# Patient Record
Sex: MALE | Race: WHITE | NOT HISPANIC OR LATINO | Employment: OTHER | URBAN - METROPOLITAN AREA
[De-identification: names, ages, dates, MRNs, and addresses within clinical notes are randomized per-mention and may not be internally consistent; named-entity substitution may affect disease eponyms.]

---

## 2017-01-30 ENCOUNTER — ANTI-COAG VISIT (OUTPATIENT)
Dept: CARDIOLOGY | Facility: CLINIC | Age: 82
End: 2017-01-30
Payer: MEDICARE

## 2017-01-30 DIAGNOSIS — Z79.01 LONG TERM (CURRENT) USE OF ANTICOAGULANTS: Primary | ICD-10-CM

## 2017-01-30 DIAGNOSIS — I48.91 ATRIAL FIBRILLATION, UNSPECIFIED TYPE: ICD-10-CM

## 2017-01-30 LAB
CTP QC/QA: YES
INR PPP: 2.5 (ref 2–3)

## 2017-01-30 PROCEDURE — 85610 PROTHROMBIN TIME: CPT | Mod: PBBFAC,PO | Performed by: PHARMACIST

## 2017-01-30 PROCEDURE — 99999 PR PBB SHADOW E&M-EST. PATIENT-LVL I: CPT | Mod: PBBFAC,,, | Performed by: PHARMACIST

## 2017-01-30 PROCEDURE — 99211 OFF/OP EST MAY X REQ PHY/QHP: CPT | Mod: PBBFAC,PO | Performed by: PHARMACIST

## 2017-01-30 NOTE — PROGRESS NOTES
INR in range.  Pt denies any changes in meds/diet/health.  Will continue dose and recheck in 8 weeks

## 2017-01-30 NOTE — MR AVS SNAPSHOT
[place] [facility]  [address] [facility] [address]  [place] [address]  Phone: [phone]                  Roby Rodriguez   2017 11:15 AM   Anti-coag visit    Description:  Male : 1930   Provider:  Myrna Retana, Del   Department:  [facility]           Diagnoses this Visit        Comments    Long term (current) use of anticoagulants    -  Primary     Atrial fibrillation, unspecified type                To Do List           Future Appointments        Provider Department Dept Phone    3/13/2017 8:00 AM HOME MONITOR DEVICE CHECK, [facility] Gaurav [facility]ourke Ar[facility] [phone]    3/27/2017 11:15 AM Myrna Retana, Del [facility] [phone]    2017 10:40 AM MD Gaurav [facility] - Arrhythmia [phone]      Goals (5 Years of Data)     None      [facility]     [facility] -  Assistance  Registered nurses in the [facility] provide clinical advisement, health education, appointment booking, and other advisory services.  Call for this free service at [phone].             Medications           Message regarding Medications     Verify the changes and/or additions to your medication regime listed below are the same as discussed with your clinician today.  If any of these changes or additions are incorrect, please notify your healthcare provider.             Verify that the below list of medications is an accurate representation of the medications you are currently taking.  If none reported, the list may be blank. If incorrect, please contact your healthcare provider. Carry this list with you in case of emergency.           Current Medications     antipyrine-benzocaine (AURALGAN OR EQUIV) 5.4-1.4 % Drop PLACE 3 DROPS INTO BOTH EARS 4 (FOUR) TIMES DAILY.    guanfacine (TENEX) 1 MG Tab Take 1 tablet (1 mg total) by mouth every evening.    lisinopril (PRINIVIL,ZESTRIL) 20 MG tablet Take 1 tablet (20 mg total) by mouth 2 (two) times  daily.    metoprolol succinate (TOPROL-XL) 50 MG 24 hr tablet TAKE 1 TABLET ONCE DAILY    warfarin (COUMADIN) 5 MG tablet Take 0.5-1 tablets (2.5-5 mg total) by mouth Daily. Take 1/2 to 1 tablet every evening as instructed by [facility]           Clinical Reference Information           Allergies as of 1/30/2017     Amlodipine    Dyazide [Triamterene-hydrochlorothiazid]      Immunizations Administered on Date of Encounter - 1/30/2017     None      Orders Placed During Today's Visit      Normal Orders This Visit    POCT INR          1/30/2017 11:32 AM - Myrna Retana, PharmD      Component Results     Component    INR    2.5     Acceptable    Yes      December 2016 Details    Sun Mon Tue Wed Thu Fri Sat         1               2               3                 4               5   2.3   2.5 mg   See details      6      5 mg         7      2.5 mg         8      2.5 mg         9      2.5 mg         10      5 mg           11      2.5 mg         12      2.5 mg         13      5 mg         14      2.5 mg         15      2.5 mg         16      2.5 mg         17      5 mg           18      2.5 mg         19      2.5 mg         20      5 mg         21      2.5 mg         22      2.5 mg         23      2.5 mg         24      5 mg           25      2.5 mg         26      2.5 mg         27      5 mg         28      2.5 mg         29      2.5 mg         30      2.5 mg         31      5 mg          Date Details   12/05 Last INR check   INR: 2.3                 January 2017 Details    Sun Mon Tue Wed Thu Fri Sat     1      2.5 mg         2      2.5 mg         3      5 mg         4      2.5 mg         5      2.5 mg         6      2.5 mg         7      5 mg           8      2.5 mg         9      2.5 mg         10      5 mg         11      2.5 mg         12      2.5 mg         13      2.5 mg         14      5 mg           15      2.5 mg         16      2.5 mg         17      5 mg         18      2.5 mg          19      2.5 mg         20      2.5 mg         21      5 mg           22      2.5 mg         23      2.5 mg         24      5 mg         25      2.5 mg         26      2.5 mg         27      2.5 mg         28      5 mg           29      2.5 mg         30   2.5   2.5 mg   See details      31      5 mg              Date Details   01/30 This INR check   INR: 2.5                     How to take your warfarin dose     To take:  2.5 mg Take 0.5 of a 5 mg tablet.    To take:  5 mg Take 1 of the 5 mg tablets.           February 2017 Details    Sun Mon Tue Wed Thu Fri Sat        1      2.5 mg         2      2.5 mg         3      2.5 mg         4      5 mg           5      2.5 mg         6      2.5 mg         7      5 mg         8      2.5 mg         9      2.5 mg         10      2.5 mg         11      5 mg           12      2.5 mg         13      2.5 mg         14      5 mg         15      2.5 mg         16      2.5 mg         17      2.5 mg         18      5 mg           19      2.5 mg         20      2.5 mg         21      5 mg         22      2.5 mg         23      2.5 mg         24      2.5 mg         25      5 mg           26      2.5 mg         27      2.5 mg         28      5 mg              Date Details   No additional details            How to take your warfarin dose     To take:  2.5 mg Take 0.5 of a 5 mg tablet.    To take:  5 mg Take 1 of the 5 mg tablets.           March 2017 Details    Sun Mon Tue Wed Thu Fri Sat        1      2.5 mg         2      2.5 mg         3      2.5 mg         4      5 mg           5      2.5 mg         6      2.5 mg         7      5 mg         8      2.5 mg         9      2.5 mg         10      2.5 mg         11      5 mg           12      2.5 mg         13      2.5 mg         14      5 mg         15      2.5 mg         16      2.5 mg         17      2.5 mg         18      5 mg           19      2.5 mg         20      2.5 mg         21      5 mg         22      2.5 mg         23       2.5 mg         24      2.5 mg         25      5 mg           26      2.5 mg         27            28               29               30               31                 Date Details   No additional details    Date of next INR:  3/27/2017         How to take your warfarin dose     To take:  2.5 mg Take 0.5 of a 5 mg tablet.    To take:  5 mg Take 1 of the 5 mg tablets.           Anticoagulation Summary as of 1/30/2017     Maintenance plan 5 mg (5 mg x 1) on Tue, Sat; 2.5 mg (5 mg x 0.5) all other days    Full instructions 5 mg on Tue, Sat; 2.5 mg all other days    Next INR check 3/27/2017      Anticoagulation Episode Summary     Comments [facility] vitk 2x/wk.   2 ETOH/wk daughter-Erika

## 2017-03-10 ENCOUNTER — OFFICE VISIT (OUTPATIENT)
Dept: DERMATOLOGY | Facility: CLINIC | Age: 82
End: 2017-03-10
Payer: MEDICARE

## 2017-03-10 VITALS — BODY MASS INDEX: 25.31 KG/M2 | WEIGHT: 191 LBS | HEIGHT: 73 IN

## 2017-03-10 DIAGNOSIS — L57.0 ACTINIC KERATOSES: ICD-10-CM

## 2017-03-10 DIAGNOSIS — L82.1 SEBORRHEIC KERATOSES: ICD-10-CM

## 2017-03-10 DIAGNOSIS — Z85.828 HISTORY OF SKIN CANCER: ICD-10-CM

## 2017-03-10 DIAGNOSIS — D48.9 NEOPLASM OF UNCERTAIN BEHAVIOR: Primary | ICD-10-CM

## 2017-03-10 DIAGNOSIS — L81.4 SOLAR LENTIGO: ICD-10-CM

## 2017-03-10 PROCEDURE — 11100 PR BIOPSY OF SKIN LESION: CPT | Mod: 59,PBBFAC,PO | Performed by: DERMATOLOGY

## 2017-03-10 PROCEDURE — 17000 DESTRUCT PREMALG LESION: CPT | Mod: S$PBB,,, | Performed by: DERMATOLOGY

## 2017-03-10 PROCEDURE — 99213 OFFICE O/P EST LOW 20 MIN: CPT | Mod: PBBFAC,PO | Performed by: DERMATOLOGY

## 2017-03-10 PROCEDURE — 11101 PR BIOPSY, EACH ADDED LESION: CPT | Mod: PBBFAC,PO | Performed by: DERMATOLOGY

## 2017-03-10 PROCEDURE — 17000 DESTRUCT PREMALG LESION: CPT | Mod: PBBFAC,PO | Performed by: DERMATOLOGY

## 2017-03-10 PROCEDURE — 99999 PR PBB SHADOW E&M-EST. PATIENT-LVL III: CPT | Mod: PBBFAC,,, | Performed by: DERMATOLOGY

## 2017-03-10 PROCEDURE — 17003 DESTRUCT PREMALG LES 2-14: CPT | Mod: PBBFAC,PO | Performed by: DERMATOLOGY

## 2017-03-10 PROCEDURE — 88305 TISSUE EXAM BY PATHOLOGIST: CPT | Mod: 26,,, | Performed by: PATHOLOGY

## 2017-03-10 PROCEDURE — 99213 OFFICE O/P EST LOW 20 MIN: CPT | Mod: 25,S$PBB,, | Performed by: DERMATOLOGY

## 2017-03-10 PROCEDURE — 88305 TISSUE EXAM BY PATHOLOGIST: CPT | Mod: 59 | Performed by: PATHOLOGY

## 2017-03-10 PROCEDURE — 11101 PR BIOPSY, EACH ADDED LESION: CPT | Mod: S$PBB,,, | Performed by: DERMATOLOGY

## 2017-03-10 PROCEDURE — 17003 DESTRUCT PREMALG LES 2-14: CPT | Mod: S$PBB,,, | Performed by: DERMATOLOGY

## 2017-03-10 PROCEDURE — 11100 PR BIOPSY OF SKIN LESION: CPT | Mod: 59,S$PBB,, | Performed by: DERMATOLOGY

## 2017-03-10 NOTE — PATIENT INSTRUCTIONS
Shave Biopsy Wound Care    Your doctor has performed a shave biopsy today.  A band aid and vaseline ointment has been placed over the site.  This should remain in place for 24 hours.  It is recommended that you keep the area dry for the first 24 hours.  After 24 hours, you may remove the band aid and wash the area with warm soap and water and apply Vaseline jelly.  Many patients prefer to use Neosporin or Bacitracin ointment.  This is acceptable; however, know that you can develop an allergy to this medication even if you have used it safely for years.  It is important to keep the area moist.  Letting it dry out and get air slows healing time, and will worsen the scar.  Band aid is optional after first 24 hours.      If you notice increasing redness, tenderness, pain, or yellow drainage at the biopsy site, please notify your doctor.  These are signs of an infection.    If your biopsy site is bleeding, apply firm pressure for 15 minutes straight.  Repeat for another 15 minutes, if it is still bleeding.   If the surgical site continues to bleed, then please contact your doctor.       [facility]  [place] [facility]  [address]  [place] [address]  Dept: [phone]       CRYOSURGERY      Your doctor has used a method called cryosurgery to treat your skin condition. Cryosurgery refers to the use of very cold substances to treat a variety of skin conditions such as warts, pre-skin cancers, molluscum contagiosum, sun spots, and several benign growths. The substance we use in cryosurgery is liquid nitrogen and is so cold (-195 degrees Celsius) that is burns when administered.     Following treatment in the office, the skin may immediately burn and become red. You may find the area around the lesion is affected as well. It is sometimes necessary to treat not only the lesion, but a small area of the surrounding normal skin to achieve a good response.     A blister, and even a blood filled blister,  may form after treatment.   This is a normal response. If the blister is painful, it is acceptable to sterilize a needle and with rubbing alcohol and gently pop the blister. It is important that you gently wash the area with soap and warm water as the blister fluid may contain wart virus if a wart was treated. Do no remove the roof of the blister.     The area treated can take anywhere from 1-3 weeks to heal. Healing time depends on the kind skin lesion treated, the location, and how aggressively the lesion was treated. It is recommended that the areas treated are covered with Vaseline or bacitracin ointment and a band-aid. If a band-aid is not practical, just ointment applied several times per day will do. Keeping these areas moist will speed the healing time.    Treatment with liquid nitrogen can leave a scar. In dark skin, it may be a light or dark scar, in light skin it may be a white or pink scar. These will generally fade with time, but may never go away completely.     If you have any concerns after your treatment, please feel free to call the office.         [facility]  [place] [facility]  [address] [place] [address]  Dept: [phone]

## 2017-03-10 NOTE — PROGRESS NOTES
Subjective:       Patient ID:  Roby Rodriguez is a 86 y.o. male who presents for   Chief Complaint   Patient presents with    Skin Check     UBSE    Spot     Left cheek     HPI Comments: Initial visit  Patient last seen by Dr Delgado in 2014  H/o BCC R neck, treated with aldara  This is a high risk patient here to check for the development of new lesions.    + Pacemaker      Spot  - Initial  Affected locations: left cheek  Duration: 4 months  Signs / symptoms: scabs and tender  Severity: moderate to severe  Timing: constant  Aggravated by: nothing  Relieving factors/Treatments tried: nothing        Review of Systems   Constitutional: Negative for fever and chills.   Skin: Positive for wears hat. Negative for itching, rash, daily sunscreen use and activity-related sunscreen use.   Hematologic/Lymphatic: Bruises/bleeds easily.        Objective:    Physical Exam   Constitutional: He appears well-developed and well-nourished.   Neurological: He is alert and oriented to person, place, and time.   Psychiatric: He has a normal mood and affect.   Skin:   Areas Examined (abnormalities noted in diagram):   Scalp / Hair Palpated and Inspected  Head / Face Inspection Performed  Neck Inspection Performed  Chest / Axilla Inspection Performed  Abdomen Inspection Performed  Back Inspection Performed  RUE Inspected  LUE Inspection Performed                   Diagram Legend     Erythematous scaling macule/papule c/w actinic keratosis       Vascular papule c/w angioma      Pigmented verrucoid papule/plaque c/w seborrheic keratosis      Yellow umbilicated papule c/w sebaceous hyperplasia      Irregularly shaped tan macule c/w lentigo     1-2 mm smooth white papules consistent with Milia      Movable subcutaneous cyst with punctum c/w epidermal inclusion cyst      Subcutaneous movable cyst c/w pilar cyst      Firm pink to brown papule c/w dermatofibroma      Pedunculated fleshy papule(s) c/w skin tag(s)      Evenly pigmented  macule c/w junctional nevus     Mildly variegated pigmented, slightly irregular-bordered macule c/w mildly atypical nevus      Flesh colored to evenly pigmented papule c/w intradermal nevus       Pink pearly papule/plaque c/w basal cell carcinoma      Erythematous hyperkeratotic cursted plaque c/w SCC      Surgical scar with no sign of skin cancer recurrence      Open and closed comedones      Inflammatory papules and pustules      Verrucoid papule consistent consistent with wart     Erythematous eczematous patches and plaques     Dystrophic onycholytic nail with subungual debris c/w onychomycosis     Umbilicated papule    Erythematous-base heme-crusted tan verrucoid plaque consistent with inflamed seborrheic keratosis     Erythematous Silvery Scaling Plaque c/w Psoriasis     See annotation              Assessment / Plan:      Pathology Orders:      Normal Orders This Visit    Tissue Specimen To Pathology, Dermatology     Questions:    Directional Terms:  Other(comment)    Clinical information:  1/2- left zygoma, crusted nodule, SCC/KA vs other, 2/2- left helical rim, warty papule, ISK vs SCC    Specific Site:  1/2- left zygoma,  2/2- left helical rim,        Neoplasm of uncertain behavior  -     Tissue Specimen To Pathology, Dermatology  Shave biopsy procedure note:x2    Shave biopsy performed after verbal consent including risk of infection, scar, recurrence, need for additional treatment of site. Area prepped with alcohol, anesthetized with approximately 1.0cc of 1% lidocaine with epinephrine. Lesional tissue shaved with razor blade. Hemostasis achieved with application of aluminum chloride and silver nitrate NO HYFRECATION-PACEMAKER. No complications. Dressing applied. Wound care explained.    History of skin cancer  Area of previous NMSC (central forehead, r neck) examined. Site well healed with no signs of recurrence.  Upper body skin examination performed today including at least 9 points as noted in physical  examination. No lesions suspicious for malignancy noted.    Actinic keratoses  Cryosurgery Procedure Note    Verbal consent from the patient is obtained and the patient is aware of the precancerous quality and need for treatment of these lesions. Liquid nitrogen cryosurgery is applied to the 8 actinic keratoses, as detailed in the physical exam, to produce a freeze injury. The patient is aware that blisters may form and is instructed on wound care with gentle cleansing and use of vaseline ointment to keep moist until healed. The patient is supplied a handout on cryosurgery and is instructed to call if lesions do not completely resolve.    Seborrheic keratoses  These are benign inherited growths without a malignant potential. Reassurance given to patient. No treatment is necessary.     Solar lentigo  This is a benign hyperpigmented sun induced lesion. Daily sun protection will reduce the number of new lesions. Treatment of these benign lesions are considered cosmetic.    Patient instructed in importance in daily sun protection of at least spf 30. Sun avoidance and topical protection discussed.   Recommend Elta MD (physician office) COTZ sensitive (available online) for daily use on face and neck.  Patient encouraged to wear hat for all outdoor exposure.   Also discussed sun protective clothing.             Return in about 6 months (around 9/10/2017).

## 2017-03-13 ENCOUNTER — CLINICAL SUPPORT (OUTPATIENT)
Dept: ELECTROPHYSIOLOGY | Facility: CLINIC | Age: 82
End: 2017-03-13
Payer: MEDICARE

## 2017-03-13 ENCOUNTER — TELEPHONE (OUTPATIENT)
Dept: DERMATOLOGY | Facility: CLINIC | Age: 82
End: 2017-03-13

## 2017-03-13 DIAGNOSIS — Z95.0 PACEMAKER: ICD-10-CM

## 2017-03-13 DIAGNOSIS — Z95.0 CARDIAC PACEMAKER IN SITU: ICD-10-CM

## 2017-03-13 PROCEDURE — 93294 REM INTERROG EVL PM/LDLS PM: CPT | Mod: ,,, | Performed by: INTERNAL MEDICINE

## 2017-03-13 PROCEDURE — 93296 REM INTERROG EVL PM/IDS: CPT | Mod: PBBFAC | Performed by: INTERNAL MEDICINE

## 2017-03-13 NOTE — TELEPHONE ENCOUNTER
----- Message from Esha Chin sent at 3/13/2017 12:21 PM CDT -----  Contact: Erika Rodriguez  Patient is asking how to care for the burn site, what does he clean it with?  How often?  Please call patient's daughter, Erika Rodriguez at [phone].  Thanks!

## 2017-03-13 NOTE — TELEPHONE ENCOUNTER
Advised caller to keep band aid with vaseline, change twice a day, do not avoid cleaning in shower. Just be gentle.

## 2017-03-21 ENCOUNTER — INITIAL CONSULT (OUTPATIENT)
Dept: DERMATOLOGY | Facility: CLINIC | Age: 82
End: 2017-03-21
Payer: MEDICARE

## 2017-03-21 VITALS
SYSTOLIC BLOOD PRESSURE: 141 MMHG | HEIGHT: 74 IN | DIASTOLIC BLOOD PRESSURE: 100 MMHG | BODY MASS INDEX: 24.38 KG/M2 | WEIGHT: 190 LBS | HEART RATE: 114 BPM

## 2017-03-21 DIAGNOSIS — C44.329 SQUAMOUS CELL CARCINOMA OF SKIN OF CHEEK: Primary | ICD-10-CM

## 2017-03-21 PROCEDURE — 99213 OFFICE O/P EST LOW 20 MIN: CPT | Mod: PBBFAC,PO | Performed by: DERMATOLOGY

## 2017-03-21 PROCEDURE — 99213 OFFICE O/P EST LOW 20 MIN: CPT | Mod: S$PBB,,, | Performed by: DERMATOLOGY

## 2017-03-21 PROCEDURE — 99999 PR PBB SHADOW E&M-EST. PATIENT-LVL III: CPT | Mod: PBBFAC,,, | Performed by: DERMATOLOGY

## 2017-03-21 NOTE — LETTER
March 21, 2017      Erin Vitale MD  [address] [address]           [facility]  [address] [facility] [address] [place] [address]  Phone: [phone]          Patient: Roby Rodriguez   MR Number: [id number]   YOB: 1930   Date of Visit: 3/21/2017       Dear Dr. Erin Vitale:    Thank you for referring Roby Rodriguez to me for evaluation. Attached you will find relevant portions of my assessment and plan of care.    If you have questions, please do not hesitate to call me. I look forward to following Roby Rodriguez along with you.    Sincerely,    Desmond Mcintyre MD    Enclosure  CC:  No Recipients    If you would like to receive this communication electronically, please contact [email or web address] or [phone] to request more information on [employer] Link access.    For providers and/or their staff who would like to refer a patient to [facility], please contact us through our one-stop-shop provider referral line, [facility] , at [phone].    If you feel you have received this communication in error or would no longer like to receive these types of communications, please e-mail [email or web address]

## 2017-03-21 NOTE — PROGRESS NOTES
ALLERGIES:  Amlodipine and Dyazide [triamterene-hydrochlorothiazid]    CHIEF COMPLAINT:  This 86 y.o. 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 Consultation requested by Erin Vitale MD.    The patient is accompanied to this visit by his daughter.    HISTORY OF PRESENT ILLNESS:   Location: left zygomatic   Duration: 5 months  or more  Quality: persistent  Context: status post biopsy by Erin Vitale MD; path = squamous cell carcinoma; pathology accession #[id number], [facility]    Prior Treatment: none    See also the handwritten notes/diagrams scanned to chart for additional details.    Defibrillator: No  Pacemaker: Yes  Artificial heart valves: No  Artificial joints: No    REVIEW OF SYSTEMS:   General: general health fair  Skin: has previous history of skin cancer(s)  CV: has hypertension, no artificial valves, has no chest pain; has a pacemaker; has atrial fibrillation and supraventricular tachycardia  Resp: has no shortness of breath  Endo: has no diabetes  Hem/Lymph: taking prescribed anticoagulants-Coumadin, has easy bruising/bleeding  Allergy/Immuno: has allergies as noted above  GI: has no history of hepatitis  MS: as noted above     PAST MEDICAL HISTORY:  Past Medical History:   Diagnosis Date    *Atrial fibrillation     Allergy     Arthritis     Atrial fibrillation     Basal cell carcinoma 10/12/10    left earlobe    Basal cell carcinoma 7/23/2014    right neck    Cancer     Hx of Squamous Cell CA Scalp    Conductive hearing loss, bilateral     Chronic Eustachian Tube Dysfunction    Frequency of urination     Gastroenteritis     Headache(784.0)     History of colonoscopy     reportedly normal in 2004.    Hypertension     Kidney stones     Otitis media     SQUAMOUS CELL CARCINOMA 7/27/07    SCC ant scalp    Supraventricular tachycardia        PAST SURGICAL  HISTORY:  Past Surgical History:   Procedure Laterality Date    ADENOIDECTOMY      Cardiac pacemaker implantation      COLONOSCOPY      Duputyren Contracture Right Hand      INSERT / REPLACE / REMOVE PACEMAKER      Resection of Skin Cancer Scalp      TONSILLECTOMY          SOCIAL HISTORY:  Dependencies: smoking status as noted below  Social History   Substance Use Topics    Smoking status: Never Smoker    Smokeless tobacco: Never Used      Comment: The patient is becoming more active.    Alcohol use 3.5 oz/week     7 Standard drinks or equivalent per week       PERTINENT MEDICATIONS:  See medications list.  Current Outpatient Prescriptions on File Prior to Visit   Medication Sig Dispense Refill    antipyrine-benzocaine (AURALGAN OR EQUIV) 5.4-1.4 % Drop PLACE 3 DROPS INTO BOTH EARS 4 (FOUR) TIMES DAILY. 42 mL 4    guanfacine (TENEX) 1 MG Tab Take 1 tablet (1 mg total) by mouth every evening. 90 tablet 3    lisinopril (PRINIVIL,ZESTRIL) 20 MG tablet Take 1 tablet (20 mg total) by mouth 2 (two) times daily. 180 tablet 3    metoprolol succinate (TOPROL-XL) 50 MG 24 hr tablet TAKE 1 TABLET ONCE DAILY 90 tablet 0    warfarin (COUMADIN) 5 MG tablet Take 0.5-1 tablets (2.5-5 mg total) by mouth Daily. Take 1/2 to 1 tablet every evening as instructed by [facility] 80 tablet 3     No current facility-administered medications on file prior to visit.        ALLERGIES:  Amlodipine and Dyazide [triamterene-hydrochlorothiazid]    EXAM:  See also the handwritten notes/diagrams scanned to chart for additional details.  Constitutional  General appearance: well-developed, well-nourished, well-kempt older white male    Eyes  Inspection of conjunctivae and lids reveals no abnormalities; sclerae anicteric  Neurologic/Psychiatric  Alert,  normal orientation to time, place, person  Normal mood and affect with no evidence of depression, anxiety, agitation  Skin: see photo(s)  Head: background marked solar damage to  exposed areas of skin; in addition, inspection/palpation reveals an approximately 12 mm pink, crusty plaque on the left zygomatic area which feels freely movable over the underlying tissues on palpation;  he confirmed this as the site of the prior biopsy  Neck: examination reveals marked chronic solar damage  Right upper extremity: examination reveals marked chronic solar damage; some ecchymosis/ecchymoses  Left upper extremity: examination reveals marked chronic solar damage; some ecchymosis/ecchymoses     ASSESSMENT: 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 In particular, the discussion included, but was not limited to, the following:    One alternative at this point would be to defer further treatment and observe the lesion. With small skin cancers of this kind, it is possible that a biopsy can be sufficient to definitively treat a small skin cancer of this kind. Alternatively, some skin cancers are slow growing and do not require immediate treatment. The potential advantage of this choice would be to avoid the need for possibly unnecessary additional surgery. Among the potential disadvantages of this would be the possibility of enlargement of the lesion, more extensive spread of the lesion or recurrence at a later date, which might necessitate a larger and more complex surgery.    Radiation treatment can be an effective treatment for this type of skin cancer. The usual course of treatment is every weekday for several weeks. Local  irritation will result from treatment, although no systemic side effects are expected. The potential advantage of radiation treatment is that it avoids the need for surgery. Among the disadvantages of radiation treatment are the length of treatment, the local inflammatory response, the absence of pathologic confirmation of the removal of the skin cancer, a possible increased risk of additional skin cancer in the treated area in later years, and a somewhat increased risk of recurrence at a later date.     Excisional surgery can be an effective treatment for this type of skin cancer. This would involve excision of the lesion with margin evaluation by submitting the specimen to a pathologist for either immediate marginal assessment via frozen section processing, or delayed marginal assessment by fixed-tissue processing. The potential advantage of this technique is that it offers a way of treating the lesion with some degree of histologic confirmation of tumor removal. Among the disadvantages of this treatment are the possible need for re-excision if marginal involvement is identified, a somewhat greater likelihood of recurrence as compared to Mohs' surgery because of the less comprehensive margin evaluation inherent in the technique, and the general potential risks of surgery, including allergic reactions to the anesthetic and other materials used, infection, injury to nerves in the area with consequent loss of sensation or muscle function, and scarring or distortion of surrounding structures.    Mohs' surgery is a very effective treatment for this type of skin cancer. The potential advantage of Mohs' surgery is that this technique offers the greatest possible certainty of knowing that the skin cancer has been completely removed, with the removal of the least amount of normal tissue. The potential disadvantages of Mohs' surgery include the duration of the surgery, the possible need for a separate surgery for  reconstruction following tumor removal, and scarring as a result. In addition, general potential risks of surgery as noted above also apply to treatment via Mohs' surgery.    In light of the nature of this tumor and the location on the face in an area of increased risk of recurrence,  Mohs' micrographic surgery was thought to be the most appropriate management choice, and this diagnosis is appropriate for treatment by Mohs' micrographic surgery.     Sufficient time was available for questions, and all questions were answered to his satisfaction. He fully understands the aims, risks, alternatives, and possible complications, and has elected to proceed with the surgery, and verbally consented to do so. The procedure will be scheduled in the near future.    Routine pre-op instructions were given to him.    The patient was instructed to continue warfarin prior to surgery.    --------------------------------------  Note: some or all of this note may have been generated using voice recognition software and thus may contain grammatical and/or spelling errors.

## 2017-03-27 ENCOUNTER — ANTI-COAG VISIT (OUTPATIENT)
Dept: CARDIOLOGY | Facility: CLINIC | Age: 82
End: 2017-03-27
Payer: MEDICARE

## 2017-03-27 DIAGNOSIS — Z79.01 LONG TERM (CURRENT) USE OF ANTICOAGULANTS: Primary | ICD-10-CM

## 2017-03-27 DIAGNOSIS — I48.91 ATRIAL FIBRILLATION, UNSPECIFIED TYPE: ICD-10-CM

## 2017-03-27 LAB — INR PPP: 1.8 (ref 2–3)

## 2017-03-27 PROCEDURE — 85610 PROTHROMBIN TIME: CPT | Mod: PBBFAC,PO | Performed by: PHARMACIST

## 2017-03-27 PROCEDURE — 99999 PR PBB SHADOW E&M-EST. PATIENT-LVL I: CPT | Mod: PBBFAC,,, | Performed by: PHARMACIST

## 2017-03-27 PROCEDURE — 99211 OFF/OP EST MAY X REQ PHY/QHP: CPT | Mod: PBBFAC,PO | Performed by: PHARMACIST

## 2017-03-27 NOTE — MR AVS SNAPSHOT
[place] [facility]  [address] [facility] [address]  [place] [address]  Phone: [phone]                  Roby Rodriguez   3/27/2017 11:15 AM   Anti-coag visit    Description:  Male : 1930   Provider:  Myrna Retana, PharmD   Department:  [place] [facility]           Diagnoses this Visit        Comments    Long term (current) use of anticoagulants    -  Primary     Atrial fibrillation, unspecified type                To Do List           Future Appointments        Provider Department Dept Phone    3/28/2017 8:00 AM Desmond Mcintyre MD [facility] [phone]    2017 10:40 AM Vincenzo Velazquez MD [facility] [phone]    2017 11:00 AM Myrna Retana, [facility] [place] [facility] [phone]    2017 8:00 AM [facility] [phone]      Goals (5 Years of Data)     None      [facility]     [facility] - 24/7 Assistance  Registered nurses in the [facility] provide clinical advisement, health education, appointment booking, and other advisory services.  Call for this free service at [phone].             Medications           Message regarding Medications     Verify the changes and/or additions to your medication regime listed below are the same as discussed with your clinician today.  If any of these changes or additions are incorrect, please notify your healthcare provider.             Verify that the below list of medications is an accurate representation of the medications you are currently taking.  If none reported, the list may be blank. If incorrect, please contact your healthcare provider. Carry this list with you in case of emergency.           Current Medications     antipyrine-benzocaine (AURALGAN OR EQUIV) 5.4-1.4 % Drop PLACE 3 DROPS INTO BOTH EARS 4 (FOUR) TIMES DAILY.    guanfacine (TENEX) 1 MG Tab Take 1 tablet (1 mg total) by mouth every evening.    lisinopril  (PRINIVIL,ZESTRIL) 20 MG tablet Take 1 tablet (20 mg total) by mouth 2 (two) times daily.    metoprolol succinate (TOPROL-XL) 50 MG 24 hr tablet TAKE 1 TABLET ONCE DAILY    warfarin (COUMADIN) 5 MG tablet Take 0.5-1 tablets (2.5-5 mg total) by mouth Daily. Take 1/2 to 1 tablet every evening as instructed by [facility]           Clinical Reference Information           Allergies as of 3/27/2017     Amlodipine    Dyazide [Triamterene-hydrochlorothiazid]      Immunizations Administered on Date of Encounter - 3/27/2017     None      Orders Placed During Today's Visit      Normal Orders This Visit    POCT INR          3/27/2017 11:16 AM - Myrna Retana, PharmD      Component Results     Component    INR    1.8      January 2017 Details    Sun Mon Tue Wed Thu Fri Sat     1               2               3               4               5               6               7                 8               9               10               11               12               13               14                 15               16               17               18               19               20               21                 22               23               24               25               26               27               28                 29               30   2.5   2.5 mg   See details      31      5 mg              Date Details   01/30 Last INR check   INR: 2.5                 February 2017 Details    Sun Mon Tue Wed Thu Fri Sat        1      2.5 mg         2      2.5 mg         3      2.5 mg         4      5 mg           5      2.5 mg         6      2.5 mg         7      5 mg         8      2.5 mg         9      2.5 mg         10      2.5 mg         11      5 mg           12      2.5 mg         13      2.5 mg         14      5 mg         15      2.5 mg         16      2.5 mg         17      2.5 mg         18      5 mg           19      2.5 mg         20      2.5 mg         21      5 mg         22      2.5 mg          23      2.5 mg         24      2.5 mg         25      5 mg           26      2.5 mg         27      2.5 mg         28      5 mg              Date Details   No additional details              March 2017 Details    Sun Mon Tue Wed Thu Fri Sat        1      2.5 mg         2      2.5 mg         3      2.5 mg         4      5 mg           5      2.5 mg         6      2.5 mg         7      5 mg         8      2.5 mg         9      2.5 mg         10      2.5 mg         11      5 mg           12      2.5 mg         13      2.5 mg         14      5 mg         15      2.5 mg         16      2.5 mg         17      2.5 mg         18      5 mg           19      2.5 mg         20      2.5 mg         21      5 mg         22      Missed         23      2.5 mg         24      2.5 mg         25      5 mg           26      2.5 mg         27   1.8   2.5 mg   See details      28      5 mg         29      2.5 mg         30      2.5 mg         31      2.5 mg           Date Details   03/27 This INR check   INR: 1.8                     How to take your warfarin dose     To take:  2.5 mg Take 0.5 of a 5 mg tablet.    To take:  5 mg Take 1 of the 5 mg tablets.           April 2017 Details    Sun Mon Tue Wed Thu Fri Sat           1      5 mg           2      2.5 mg         3      2.5 mg         4      5 mg         5      2.5 mg         6      2.5 mg         7      2.5 mg         8      5 mg           9      2.5 mg         10      2.5 mg         11      5 mg         12      2.5 mg         13      2.5 mg         14      2.5 mg         15      5 mg           16      2.5 mg         17      2.5 mg         18      5 mg         19      2.5 mg         20      2.5 mg         21      2.5 mg         22      5 mg           23      2.5 mg         24      2.5 mg         25      5 mg         26      2.5 mg         27      2.5 mg         28      2.5 mg         29      5 mg           30      2.5 mg                Date Details   No additional details             How to take your warfarin dose     To take:  2.5 mg Take 0.5 of a 5 mg tablet.    To take:  5 mg Take 1 of the 5 mg tablets.           May 2017 Details    Sun Mon Tue Wed Thu Fri Sat      1      2.5 mg         2      5 mg         3      2.5 mg         4      2.5 mg         5      2.5 mg         6      5 mg           7      2.5 mg         8      2.5 mg         9      5 mg         10      2.5 mg         11      2.5 mg         12      2.5 mg         13      5 mg           14      2.5 mg         15      2.5 mg         16      5 mg         17      2.5 mg         18      2.5 mg         19      2.5 mg         20      5 mg           21      2.5 mg         22            23               24               25               26               27                 28               29               30               31                   Date Details   No additional details    Date of next INR:  5/22/2017         How to take your warfarin dose     To take:  2.5 mg Take 0.5 of a 5 mg tablet.    To take:  5 mg Take 1 of the 5 mg tablets.           Anticoagulation Summary as of 3/27/2017     Maintenance plan 5 mg (5 mg x 1) on Tue, Sat; 2.5 mg (5 mg x 0.5) all other days    Full instructions 5 mg on Tue, Sat; 2.5 mg all other days    Next INR check 5/22/2017      Anticoagulation Episode Summary     Comments [facility] vitk 2x/wk.   2 ETOH/wk daughter-Erika      Patient Findings     Negatives Signs/symptoms of thrombosis, Signs/symptoms of bleeding, Laboratory test error suspected, Change in health, Change in alcohol use, Change in activity, Upcoming invasive procedure, Emergency department visit, Upcoming dental procedure, Missed doses, Extra doses, Change in medications, Change in diet/appetite, Hospital admission, Bruising, Other complaints      Language Assistance Services     ATTENTION: Language assistance services are available, free of charge. Please call [phone].      ATENCIÓN: Si samia barragan a martinez disposición  servicios gratuitos de asistencia lingüística. Robyn corbin [phone].     UDAY Ý: N?u b?n nói Ti?ng Vi?t, có các d?ch v? h? tr? ngôn ng? mi?n phí dành cho b?n. G?i s? [phone].         [place]  [facility] complies with applicable Federal civil rights laws and does not discriminate on the basis of race, color, national origin, age, disability, or sex.

## 2017-03-27 NOTE — PROGRESS NOTES
"INr just below range today, but is acceptable.  Pt missed dose on wed,  He will be having Mohs, tomorrow on SCC of cheek.  No other changes. Pt/daughter will call with any changes.  Maintain and recheck in 8 weeks "This note will not be shared with the patient."  "

## 2017-03-28 ENCOUNTER — PROCEDURE VISIT (OUTPATIENT)
Dept: DERMATOLOGY | Facility: CLINIC | Age: 82
DRG: 065 | End: 2017-03-28
Payer: MEDICARE

## 2017-03-28 VITALS
HEIGHT: 74 IN | WEIGHT: 190 LBS | SYSTOLIC BLOOD PRESSURE: 151 MMHG | BODY MASS INDEX: 24.38 KG/M2 | HEART RATE: 84 BPM | DIASTOLIC BLOOD PRESSURE: 84 MMHG

## 2017-03-28 DIAGNOSIS — C44.320 SQUAMOUS CELL CARCINOMA, FACE: Primary | ICD-10-CM

## 2017-03-28 PROCEDURE — 13132 CMPLX RPR F/C/C/M/N/AX/G/H/F: CPT | Mod: 51,S$PBB,, | Performed by: DERMATOLOGY

## 2017-03-28 PROCEDURE — 17311 MOHS 1 STAGE H/N/HF/G: CPT | Mod: S$PBB,,, | Performed by: DERMATOLOGY

## 2017-03-28 PROCEDURE — 99499 UNLISTED E&M SERVICE: CPT | Mod: S$PBB,,, | Performed by: DERMATOLOGY

## 2017-03-28 NOTE — PROGRESS NOTES
ALLERGIES:  Amlodipine and Dyazide [triamterene-hydrochlorothiazid]  -------------------------------------------------------------  PROCEDURE: Mohs' Micrographic Surgery    SITE: left zygomatic cheek/temple    INDICATION: squamous cell carcinoma in an area at increased risk of recurrence; ill-defined    CASE NUMBER: [id number]      ANESTHETIC: 3 mL 1% Lidocaine with Epinephrine 1:100,000, buffered    SURGICAL PREP: Ethanol and Hibiclens    SURGEON: Desmond Mcintyre MD    ASSISTANTS: Karen Harrell CST     PREOPERATIVE DIAGNOSIS: squamous cell carcinoma     POSTOPERATIVE DIAGNOSIS: squamous cell carcinoma     PATHOLOGIC DIAGNOSIS: squamous cell carcinoma     STAGES OF MOHS' SURGERY PERFORMED: one    TUMOR-FREE PLANE ACHIEVED: yes    HEMOSTASIS: Hyfrecation     SPECIMENS: one (one in stage A)    INITIAL LESION SIZE: 1.3 x 1.5 cm    FINAL DEFECT SIZE: 1.2 x 1.6 cm    WOUND REPAIR/DISPOSITION: see below    NARRATIVE:    The patient is a 86 y.o.male referred by Erin Vitale MD with a history of cancer on the left zygomatic cheek/temple which was biopsied - pathology accession #[id number], [facility]. Findings revealed squamous cell carcinoma. Examination revealed a raised, verrucoid plaque on an ill-defined area of surrounding erythema which may represent subclinical extension at the site of prior biopsy, which was confirmed by reference to the photograph taken at the previous patient visit. In light of the ill-defined nature of this tumor and the location over the approximate course of the temporal branch of the facial nerve, Mohs' micrographic surgery was thought to be the most appropriate management choice, and this diagnosis is appropriate for treatment by Mohs' micrographic surgery.  I discussed it with the patient and he fully understands the aims, risks, alternatives, and possible complications, and elects to proceed.  There are no medical or surgical contraindications to the procedure.     A signed  "informed consent was obtained.    PROCEDURE:  The patient was placed in the semi-recumbent position on the operating table in the Mohs' Surgery Suite. The area in question was thoroughly prepped with ethanol and Hibiclens, With particular care to avoid application to the immediate periocular skin or introduction into the external auditory meatus . A sterile surgical marker was used to outline the clinically apparent margins of the involved area, and a narrow margin of normal-appearing skin. Reference marks were made at the periphery of the outlined area with the surgical marker. The proposed area of excision was measured and photographed. Local anesthesia of 1% Lidocaine with 1:100,000 epinephrine, buffered with sodium bicarbonate, was administered.  The total volume of anesthetic used throughout this portion of the procedure was as documented above. The area was prepared and draped in the standard manner. All of the grossly identifiable area of clinically abnormal tissue and an underlying/peripheral layer was taken and processed by the Mohs' technique.  Hemostasis was obtained with the hyfrecator. Tissue was taken from any areas of residual marginal involvement (if present) and processed by the Mohs' technique in as many stages as needed until a tumor-free plane was achieved.    Colors of inks used in the reference nicks at epidermal margins (if present) and/or inking of non-epithelial edges, if applicable, is represented on the Mohs map as follows: solid lines represent red ink, dots represent blue ink, jagged lines represent black ink, curlicues represent green ink, "xxx" represents yellow ink.    The first Mohs' layer consisted of one section(s) with 5 slide(s) evaluated. Histology of the specimen(s) showed foci of actinic keratosis to epidermal margins; in addition, tumor was present on later cuts at the deep margin as noted on the Mohs map; multiple earlier cuts were clear, and actual surgical margins were " assessed as clear.     A total of one section(s) and 5 slide(s) were examined under the microscope via the Mohs technique.  A cancer free plane was reached after layer number one. Defect final size was as noted above.      The wound was covered with a nonadherent dressing between stages, and the patient allowed to wait in the waiting area during these periods. The final defect was photographed at the completion of the Mohs' procedure.    See the separate procedure note which follows regarding repair of the defect following Mohs' surgery.       -----------------------------------------------    REPAIR FOLLOWING MOHS' MICROGRAPHIC SURGERY    PREOPERATIVE DIAGNOSIS: defect following Mohs' surgery for a squamous cell carcinoma    POSTOPERATIVE DIAGNOSIS: same    PROCEDURE PERFORMED: complex linear closure     ANESTHETIC: 3 mL 1% Lidocaine with Epinephrine 1:100,000, buffered     SURGICAL PREP: Hibiclens    SURGEON: Desmond Mcintyre MD     ASSISTANTS: as above    LOCATION: left zygomatic cheek/temple      INDICATIONS:  Earlier in the day, the patient underwent Mohs' micrographic surgical excision of a squamous cell carcinoma on the left zygoma. Tumor free margins were achieved after layer number one.  Later in the day, the management of the resulting wound was addressed with the patient. I discussed the various wound management options with the patient and he fully understands the aims, risks, alternatives, and possible complications of the alternatives, and he elects to proceed with closure of the defect in the manner noted below.  There are no medical or surgical contraindications to the procedure.    A signed informed consent was previously obtained.    PROCEDURE:  Repair via complex closure:  The patient was returned to the procedure room following completion of the Mohs' procedure and final slide review. Because of the size, shape and location of the defect, simple closure could not be achieved without possible  distortion of surrounding structures, excessive tension on the wound margins and an unacceptable risk of wound dehiscence, and the creation of standing cone deformities. Consideration was given the the site of the wound, the surrounding structures, and the orientation of closure necessary to provide the optimal functional and cosmetic outcome. After devoting time to these considerations, and to the orientation of the vectors of maximal skin tension surrounding the defect, the area was prepped again and a fusiform closure was outlined on the skin surrounding the defect with a sterile surgical marker, to minimize tension across the wound. Additional anesthetic was infiltrated into the tissues surrounding the defect and the anticipated area of repair, to maintain anesthesia during the procedure. Preparation of the site for closure was then carried out by extending the defect through excision of triangles of superfluous tissue on either side of the wound to square the shoulders of the defect and to allow closure without distortion by standing cone deformities, creating a fusiform defect measuring 1.2 x 4.0 cm in size.  Wound margins were extensively undermined to allow advancement of the wound margins into the defect and to permit closure with minimal tension. After hemostasis was achieved with the hyfrecator, closure was accomplished with:      seven #5-0 buried interrupted Vicryl suture(s) and    two #5-0 running locked Prolene suture(s) for final approximation of the wound margins.     The site was photographed following completion of the repair. Final dressing consisted of petrolatum, Telfa and tape.    Estimated blood loss for the total procedure was less than 5 mL.    Total operative time including tissue processing in the Mohs' laboratory and microscopic Mohs' frozen section slide review was 2 hour(s). Verbal and written wound care instructions were given to the patient, and he expressed understanding of these  instructions. The patient tolerated the procedure well and left the operating room in good condition; he is to return in 7 days for suture removal.     Dr. Mcintyre's cell phone number was given to the patient with instructions to call prn with any problems.

## 2017-03-31 ENCOUNTER — HOSPITAL ENCOUNTER (INPATIENT)
Facility: HOSPITAL | Age: 82
LOS: 2 days | Discharge: HOME | DRG: 065 | End: 2017-04-02
Attending: EMERGENCY MEDICINE | Admitting: PSYCHIATRY & NEUROLOGY
Payer: MEDICARE

## 2017-03-31 ENCOUNTER — TELEPHONE (OUTPATIENT)
Dept: INTERNAL MEDICINE | Facility: CLINIC | Age: 82
End: 2017-03-31

## 2017-03-31 DIAGNOSIS — I63.442 EMBOLIC STROKE INVOLVING LEFT CEREBELLAR ARTERY: ICD-10-CM

## 2017-03-31 DIAGNOSIS — I63.9 CEREBROVASCULAR ACCIDENT (CVA), UNSPECIFIED MECHANISM: Primary | ICD-10-CM

## 2017-03-31 DIAGNOSIS — I63.442 STROKE DUE TO EMBOLISM OF LEFT CEREBELLAR ARTERY: ICD-10-CM

## 2017-03-31 PROBLEM — R27.0: Status: ACTIVE | Noted: 2017-03-31

## 2017-03-31 LAB
ABO + RH BLD: NORMAL
ALBUMIN SERPL BCP-MCNC: 3.3 G/DL
ALP SERPL-CCNC: 164 U/L
ALT SERPL W/O P-5'-P-CCNC: 36 U/L
ANION GAP SERPL CALC-SCNC: 8 MMOL/L
AST SERPL-CCNC: 43 U/L
BASOPHILS # BLD AUTO: 0.02 K/UL
BASOPHILS NFR BLD: 0.3 %
BILIRUB SERPL-MCNC: 1 MG/DL
BILIRUB UR QL STRIP: NEGATIVE
BLD GP AB SCN CELLS X3 SERPL QL: NORMAL
BUN SERPL-MCNC: 13 MG/DL
CALCIUM SERPL-MCNC: 9 MG/DL
CHLORIDE SERPL-SCNC: 107 MMOL/L
CHOLEST/HDLC SERPL: 3.3 {RATIO}
CLARITY UR REFRACT.AUTO: CLEAR
CO2 SERPL-SCNC: 21 MMOL/L
COLOR UR AUTO: YELLOW
CREAT SERPL-MCNC: 0.6 MG/DL (ref 0.5–1.4)
CREAT SERPL-MCNC: 1 MG/DL
DIFFERENTIAL METHOD: ABNORMAL
EOSINOPHIL # BLD AUTO: 0.2 K/UL
EOSINOPHIL NFR BLD: 2.8 %
ERYTHROCYTE [DISTWIDTH] IN BLOOD BY AUTOMATED COUNT: 13.7 %
EST. GFR  (AFRICAN AMERICAN): >60 ML/MIN/1.73 M^2
EST. GFR  (NON AFRICAN AMERICAN): >60 ML/MIN/1.73 M^2
GLUCOSE SERPL-MCNC: 86 MG/DL
GLUCOSE UR QL STRIP: NEGATIVE
HCT VFR BLD AUTO: 43.2 %
HDL/CHOLESTEROL RATIO: 29.9 %
HDLC SERPL-MCNC: 167 MG/DL
HDLC SERPL-MCNC: 50 MG/DL
HGB BLD-MCNC: 14.6 G/DL
HGB UR QL STRIP: ABNORMAL
INR PPP: 1.7
KETONES UR QL STRIP: NEGATIVE
LDLC SERPL CALC-MCNC: 99.4 MG/DL
LEUKOCYTE ESTERASE UR QL STRIP: NEGATIVE
LYMPHOCYTES # BLD AUTO: 1.4 K/UL
LYMPHOCYTES NFR BLD: 19.2 %
MCH RBC QN AUTO: 33.2 PG
MCHC RBC AUTO-ENTMCNC: 33.8 %
MCV RBC AUTO: 98 FL
MICROSCOPIC COMMENT: NORMAL
MONOCYTES # BLD AUTO: 0.7 K/UL
MONOCYTES NFR BLD: 10.3 %
NEUTROPHILS # BLD AUTO: 4.8 K/UL
NEUTROPHILS NFR BLD: 67 %
NITRITE UR QL STRIP: NEGATIVE
NONHDLC SERPL-MCNC: 117 MG/DL
PH UR STRIP: 7 [PH] (ref 5–8)
PLATELET # BLD AUTO: 167 K/UL
PMV BLD AUTO: 10.6 FL
POC PTINR: 1.3 (ref 0.9–1.2)
POC PTINR: 1.3 (ref 0.9–1.2)
POC PTWBT: 15.3 SEC (ref 9.7–14.3)
POC PTWBT: 15.8 SEC (ref 9.7–14.3)
POCT GLUCOSE: 78 MG/DL (ref 70–110)
POTASSIUM SERPL-SCNC: 4.2 MMOL/L
PROT SERPL-MCNC: 6.9 G/DL
PROT UR QL STRIP: NEGATIVE
PROTHROMBIN TIME: 17 SEC
RBC # BLD AUTO: 4.4 M/UL
RBC #/AREA URNS AUTO: 1 /HPF (ref 0–4)
SAMPLE: ABNORMAL
SAMPLE: ABNORMAL
SAMPLE: NORMAL
SODIUM SERPL-SCNC: 136 MMOL/L
SP GR UR STRIP: 1 (ref 1–1.03)
TRIGL SERPL-MCNC: 88 MG/DL
TROPONIN I SERPL DL<=0.01 NG/ML-MCNC: 0.01 NG/ML
TSH SERPL DL<=0.005 MIU/L-ACNC: 1.75 UIU/ML
URN SPEC COLLECT METH UR: ABNORMAL
UROBILINOGEN UR STRIP-ACNC: NEGATIVE EU/DL
WBC # BLD AUTO: 7.19 K/UL
WBC #/AREA URNS AUTO: 1 /HPF (ref 0–5)

## 2017-03-31 PROCEDURE — 86900 BLOOD TYPING SEROLOGIC ABO: CPT

## 2017-03-31 PROCEDURE — 99285 EMERGENCY DEPT VISIT HI MDM: CPT | Mod: 25

## 2017-03-31 PROCEDURE — 85025 COMPLETE CBC W/AUTO DIFF WBC: CPT

## 2017-03-31 PROCEDURE — 81001 URINALYSIS AUTO W/SCOPE: CPT

## 2017-03-31 PROCEDURE — 84443 ASSAY THYROID STIM HORMONE: CPT

## 2017-03-31 PROCEDURE — 82962 GLUCOSE BLOOD TEST: CPT

## 2017-03-31 PROCEDURE — 63600175 PHARM REV CODE 636 W HCPCS: Performed by: PHYSICIAN ASSISTANT

## 2017-03-31 PROCEDURE — 82565 ASSAY OF CREATININE: CPT

## 2017-03-31 PROCEDURE — 80053 COMPREHEN METABOLIC PANEL: CPT

## 2017-03-31 PROCEDURE — 85610 PROTHROMBIN TIME: CPT

## 2017-03-31 PROCEDURE — 25500020 PHARM REV CODE 255: Performed by: EMERGENCY MEDICINE

## 2017-03-31 PROCEDURE — 93010 ELECTROCARDIOGRAM REPORT: CPT | Mod: ,,, | Performed by: INTERNAL MEDICINE

## 2017-03-31 PROCEDURE — 84484 ASSAY OF TROPONIN QUANT: CPT

## 2017-03-31 PROCEDURE — 99223 1ST HOSP IP/OBS HIGH 75: CPT | Mod: ,,, | Performed by: PSYCHIATRY & NEUROLOGY

## 2017-03-31 PROCEDURE — 25000003 PHARM REV CODE 250: Performed by: PHYSICIAN ASSISTANT

## 2017-03-31 PROCEDURE — 80061 LIPID PANEL: CPT

## 2017-03-31 PROCEDURE — 83036 HEMOGLOBIN GLYCOSYLATED A1C: CPT

## 2017-03-31 PROCEDURE — 86850 RBC ANTIBODY SCREEN: CPT

## 2017-03-31 PROCEDURE — 11000001 HC ACUTE MED/SURG PRIVATE ROOM

## 2017-03-31 PROCEDURE — 99291 CRITICAL CARE FIRST HOUR: CPT | Mod: 24,,, | Performed by: EMERGENCY MEDICINE

## 2017-03-31 PROCEDURE — 93005 ELECTROCARDIOGRAM TRACING: CPT

## 2017-03-31 RX ORDER — ATORVASTATIN CALCIUM 20 MG/1
40 TABLET, FILM COATED ORAL DAILY
Status: DISCONTINUED | OUTPATIENT
Start: 2017-04-01 | End: 2017-04-02 | Stop reason: HOSPADM

## 2017-03-31 RX ORDER — METOPROLOL SUCCINATE 50 MG/1
50 TABLET, EXTENDED RELEASE ORAL DAILY
Status: DISCONTINUED | OUTPATIENT
Start: 2017-04-01 | End: 2017-04-02 | Stop reason: HOSPADM

## 2017-03-31 RX ORDER — CLOPIDOGREL BISULFATE 75 MG/1
75 TABLET ORAL DAILY
Status: DISCONTINUED | OUTPATIENT
Start: 2017-03-31 | End: 2017-04-02 | Stop reason: HOSPADM

## 2017-03-31 RX ORDER — ENOXAPARIN SODIUM 100 MG/ML
40 INJECTION SUBCUTANEOUS EVERY 24 HOURS
Status: DISCONTINUED | OUTPATIENT
Start: 2017-03-31 | End: 2017-04-02 | Stop reason: HOSPADM

## 2017-03-31 RX ORDER — SODIUM CHLORIDE 0.9 % (FLUSH) 0.9 %
3 SYRINGE (ML) INJECTION EVERY 8 HOURS
Status: DISCONTINUED | OUTPATIENT
Start: 2017-03-31 | End: 2017-04-02 | Stop reason: HOSPADM

## 2017-03-31 RX ORDER — WARFARIN SODIUM 5 MG/1
5 TABLET ORAL DAILY
Status: DISCONTINUED | OUTPATIENT
Start: 2017-03-31 | End: 2017-03-31

## 2017-03-31 RX ORDER — NAPROXEN SODIUM 220 MG/1
81 TABLET, FILM COATED ORAL DAILY
Status: DISCONTINUED | OUTPATIENT
Start: 2017-03-31 | End: 2017-04-02 | Stop reason: HOSPADM

## 2017-03-31 RX ORDER — HYDRALAZINE HYDROCHLORIDE 20 MG/ML
10 INJECTION INTRAMUSCULAR; INTRAVENOUS EVERY 4 HOURS PRN
Status: DISCONTINUED | OUTPATIENT
Start: 2017-03-31 | End: 2017-04-02 | Stop reason: HOSPADM

## 2017-03-31 RX ORDER — GUANFACINE 1 MG/1
1 TABLET ORAL DAILY
Status: DISCONTINUED | OUTPATIENT
Start: 2017-04-01 | End: 2017-04-02 | Stop reason: HOSPADM

## 2017-03-31 RX ORDER — LABETALOL HYDROCHLORIDE 5 MG/ML
10 INJECTION, SOLUTION INTRAVENOUS
Status: DISCONTINUED | OUTPATIENT
Start: 2017-03-31 | End: 2017-03-31

## 2017-03-31 RX ADMIN — CLOPIDOGREL 75 MG: 75 TABLET, FILM COATED ORAL at 06:03

## 2017-03-31 RX ADMIN — ASPIRIN 81 MG CHEWABLE TABLET 81 MG: 81 TABLET CHEWABLE at 06:03

## 2017-03-31 RX ADMIN — ENOXAPARIN SODIUM 40 MG: 100 INJECTION SUBCUTANEOUS at 08:03

## 2017-03-31 RX ADMIN — Medication 3 ML: at 09:03

## 2017-03-31 RX ADMIN — IOHEXOL 100 ML: 350 INJECTION, SOLUTION INTRAVENOUS at 05:03

## 2017-03-31 NOTE — ED PROVIDER NOTES
Encounter Date: 3/31/2017    SCRIBE #1 NOTE: I, Salvador Luna, am scribing for, and in the presence of,  Dr. Callahan. I have scribed the entire note.       History     Chief Complaint   Patient presents with    Numbness     left leg and hand numbness 1.5 hours ago. He reports numbness has resolved.      Review of patient's allergies indicates:   Allergen Reactions    Amlodipine      Intolerant secondary to dizziness    Dyazide [triamterene-hydrochlorothiazid]      Significant weakness with his diuretic.     HPI Comments: Time patient was seen by the provider: 2:58 PM      The patient is a 86 y.o. male with hx of: HTN, atrial fibrillation, and cardiac pacemaker implantation that presents to the ED with a complaint of an acute unsteady gait, which began 2 hours ago. He notes an associated left hand numbness. He states he began feeling unwell and unsteady after inhaling paint fumes. Pt also reports a chronic intermittent LLE numbness.     The history is provided by the patient.     Past Medical History:   Diagnosis Date    *Atrial fibrillation     Allergy     Arthritis     Atrial fibrillation     Basal cell carcinoma 10/12/10    left earlobe    Basal cell carcinoma 7/23/2014    right neck    Cancer     Hx of Squamous Cell CA Scalp    Conductive hearing loss, bilateral     Chronic Eustachian Tube Dysfunction    Frequency of urination     Gastroenteritis     Headache(784.0)     History of colonoscopy     reportedly normal in 2004.    Hypertension     Kidney stones     Otitis media     SQUAMOUS CELL CARCINOMA 7/27/07    SCC ant scalp    Supraventricular tachycardia      Past Surgical History:   Procedure Laterality Date    ADENOIDECTOMY      Cardiac pacemaker implantation      COLONOSCOPY      Duputyren Contracture Right Hand      INSERT / REPLACE / REMOVE PACEMAKER      Resection of Skin Cancer Scalp      TONSILLECTOMY       Family History   Problem Relation Age of Onset    Melanoma Neg Hx      Psoriasis Neg Hx     Lupus Neg Hx     Eczema Neg Hx      Social History   Substance Use Topics    Smoking status: Never Smoker    Smokeless tobacco: Never Used      Comment: The patient is becoming more active.    Alcohol use 3.5 oz/week     7 Standard drinks or equivalent per week     Review of Systems   Constitutional: Negative for fever.   HENT: Negative for sore throat.    Respiratory: Negative for shortness of breath.    Cardiovascular: Negative for chest pain.   Gastrointestinal: Negative for nausea.   Genitourinary: Negative for dysuria.   Musculoskeletal: Positive for gait problem. Negative for back pain.   Skin: Negative for rash.   Neurological: Positive for numbness (left hand).   Hematological: Does not bruise/bleed easily.       Physical Exam   Initial Vitals   BP Pulse Resp Temp SpO2   03/31/17 1449 03/31/17 1449 03/31/17 1449 03/31/17 1449 03/31/17 1449   167/93 73 16 97.9 °F (36.6 °C) 97 %     Physical Exam    Nursing note and vitals reviewed.  HENT:   Head: Normocephalic and atraumatic.   Eyes: EOM are normal.   Neck: Neck supple.   Cardiovascular: Normal rate, regular rhythm, normal heart sounds and intact distal pulses.   Pulmonary/Chest: Breath sounds normal. No respiratory distress. He has no wheezes. He has no rhonchi. He has no rales.   Abdominal: Soft. There is no tenderness. There is no rebound and no guarding.   Musculoskeletal:   1+ pitting edema bilateral lower extremities but good pulses   Neurological: He is alert and oriented to person, place, and time.   NIH: 3-4. Negative pronator drift. Positive finger to nose on the left. 5/5 bilateral leg strength. 4/5 left upper extremity strength with decreased /push and pull strength. Positive heel to shin on the left. Positive dysdiadochokinesia. Mild gait ataxia    Skin: Skin is warm and dry.         ED Course   Critical Care  Date/Time: 3/31/2017 3:34 PM  Performed by: ELADIA MCDONALD  Authorized by: ELADIA MCDONALD   Direct patient  critical care time: 15 minutes  Ordering / reviewing critical care time: 5 minutes  Documentation critical care time: 5 minutes  Consulting other physicians critical care time: 10 minutes  Total critical care time (exclusive of procedural time) : 35 minutes  Comments: Critical Care time: 35 minutes inclusive of direct patient care, review of previous records, interpretation of labs, imaging and ekg, as well as discussion of my impression and plan of care with the patient, family and other clinicians/consultants. This time is exclusive of any separate billable procedures and of treating other patients. Critical care was required for this patient who presented with CVA requiring my emergent evaluation and management in the emergency department.             Labs Reviewed   CBC W/ AUTO DIFFERENTIAL - Abnormal; Notable for the following:        Result Value    RBC 4.40 (*)     MCH 33.2 (*)     All other components within normal limits   COMPREHENSIVE METABOLIC PANEL - Abnormal; Notable for the following:     CO2 21 (*)     Albumin 3.3 (*)     Alkaline Phosphatase 164 (*)     AST 43 (*)     All other components within normal limits   PROTIME-INR - Abnormal; Notable for the following:     Prothrombin Time 17.0 (*)     INR 1.7 (*)     All other components within normal limits   ISTAT PROCEDURE - Abnormal; Notable for the following:     POC PTWBT 15.8 (*)     POC PTINR 1.3 (*)     All other components within normal limits   LIPID PANEL   TSH   TROPONIN I   HEMOGLOBIN A1C   URINALYSIS   POCT GLUCOSE   TYPE & SCREEN   ISTAT CREATININE   POCT GLUCOSE             Medical Decision Making:   History:   Old Medical Records: I decided to obtain old medical records.  Initial Assessment:   This is an emergent evaluation of a 86 y.o.male patient with presentation of unsteady gait and numbness to his left upper extremity. On exam, he has incoordination of the left upper and lower extremities and mild weakness to the left upper  extremity. I believe he is having an acute cerebellar stroke. Initiated stroke protocol. TPA depends on vascular neurology consultation. He will likely need an MRI and admission.  Clinical Tests:   Lab Tests: Ordered and Reviewed  Radiological Study: Ordered and Reviewed  Medical Tests: Reviewed and Ordered  ED Management:  3:13 PM  Spoke with vascular neurology. Stroke code was called at 3:11 PM  Other:   I have discussed this case with another health care provider.       <> Summary of the Discussion: Vascular neurology             Scribe Attestation:   Scribe #1: I performed the above scribed service and the documentation accurately describes the services I performed. I attest to the accuracy of the note.    Attending Attestation:           Physician Attestation for Scribe:  Physician Attestation Statement for Scribe #1: I, Dr. Callahan, reviewed documentation, as scribed by Salvador Luna in my presence, and it is both accurate and complete.         Attending ED Notes:   Update:  Read from radiology, head CT negative. After discussion with neurology and family, we will opt not to give tpa at this time.           ED Course     Clinical Impression:   The primary encounter diagnosis was Cerebrovascular accident (CVA), unspecified mechanism. 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    Disposition:   Disposition: Admitted  Condition: Serious       Tobi Callahan MD  03/31/17 8037

## 2017-03-31 NOTE — TELEPHONE ENCOUNTER
----- Message from France Alvarado MA sent at 3/31/2017  1:20 PM CDT -----  Contact: reggie / Erika - [phone]   Patient c/o dizziness and would like to know what to so. Please call. Thanks!

## 2017-03-31 NOTE — IP AVS SNAPSHOT
[facility]  [address]  [facility] [address]  Phone: [phone]           Patient Discharge Instructions   Our goal is to set you up for success. This packet includes information on your condition, medications, and your home care.  It will help you care for yourself to prevent having to return to the hospital.     Please ask your nurse if you have any questions.      There are many details to remember when preparing to leave the hospital. Here is what you will need to do:    1. Take your medicine. If you are prescribed medications, review your Medication List on the following pages. You may have new medications to  at the pharmacy and others that you'll need to stop taking. Review the instructions for how and when to take your medications. Talk with your doctor or nurses if you are unsure of what to do.     2. Go to your follow-up appointments. Specific follow-up information is listed in the following pages. Your may be contacted by a nurse or clinical provider about future appointments. Be sure we have all of the phone numbers to reach you. Please contact your provider's office if you are unable to make an appointment.     3. Watch for warning signs. Your doctor or nurse will give you detailed warning signs to watch for and when to call for assistance. These instructions may also include educational information about your condition. If you experience any of warning signs to your health, call your doctor.           [facility]  Unless otherwise directed by your provider, please   contact [facility], our nurse care line   that is available for 24/7 assistance.     [phone] (toll-free)     Registered nurses in the [facility]   provide: appointment scheduling, clinical advisement, health education, and other advisory services.                  ** Verify the list of medication(s) below is accurate and up to date. Carry this with you in case of  emergency. If your medications have changed, please notify your healthcare provider.             Medication List      START taking these medications        Additional Info                      aspirin 81 MG Chew   Refills:  0   Dose:  81 mg    Last time this was given:  81 mg on 4/2/2017  8:44 AM   Instructions:  Take 1 tablet (81 mg total) by mouth once daily.     Begin Date    AM    Noon    PM    Bedtime       atorvastatin 40 MG tablet   Commonly known as:  LIPITOR   Quantity:  30 tablet   Refills:  0   Dose:  40 mg    Last time this was given:  40 mg on 4/2/2017  8:43 AM   Instructions:  Take 1 tablet (40 mg total) by mouth once daily.     Begin Date    AM    Noon    PM    Bedtime         CONTINUE taking these medications        Additional Info                      antipyrine-benzocaine 5.4-1.4 % Drop   Commonly known as:  AURALGAN or Equiv   Quantity:  42 mL   Refills:  4    Instructions:  PLACE 3 DROPS INTO BOTH EARS 4 (FOUR) TIMES DAILY.     Begin Date    AM    Noon    PM    Bedtime       guanfacine 1 MG Tab   Commonly known as:  TENEX   Quantity:  90 tablet   Refills:  3   Dose:  1 mg    Last time this was given:  1 mg on 4/2/2017  8:42 AM   Instructions:  Take 1 tablet (1 mg total) by mouth every evening.     Begin Date    AM    Noon    PM    Bedtime       lisinopril 20 MG tablet   Commonly known as:  PRINIVIL,ZESTRIL   Quantity:  180 tablet   Refills:  3   Dose:  20 mg    Last time this was given:  20 mg on 4/2/2017  8:42 AM   Instructions:  Take 1 tablet (20 mg total) by mouth 2 (two) times daily.     Begin Date    AM    Noon    PM    Bedtime       metoprolol succinate 50 MG 24 hr tablet   Commonly known as:  TOPROL-XL   Quantity:  90 tablet   Refills:  0    Last time this was given:  50 mg on 4/2/2017  8:43 AM   Instructions:  TAKE 1 TABLET ONCE DAILY     Begin Date    AM    Noon    PM    Bedtime       warfarin 5 MG tablet   Commonly known as:  COUMADIN   Quantity:  80 tablet   Refills:  3   Dose:  2.5-5  mg    Last time this was given:  5 mg on 4/1/2017  5:21 PM   Instructions:  Take 0.5-1 tablets (2.5-5 mg total) by mouth Daily. Take 1/2 to 1 tablet every evening as instructed by [facility]     Begin Date    AM    Noon    PM    Bedtime            Where to Get Your Medications      These medications were sent to [facility]/pharmacy #[id number] - [place] - [address]  [address] [address]     Phone:  [phone]     atorvastatin 40 MG tablet         You can get these medications from any pharmacy     You don't need a prescription for these medications     aspirin 81 MG Chew                  Please bring to all follow up appointments:    1. A copy of your discharge instructions.  2. All medicines you are currently taking in their original bottles.  3. Identification and insurance card.    Please arrive 15 minutes ahead of scheduled appointment time.    Please call 24 hours in advance if you must reschedule your appointment and/or time.        Your Scheduled Appointments     Apr 04, 2017  1:15 PM CDT   Post OP with Desmond Mcintyre MD   [place] - Dermatology ([facility])    [address] [facility] [address]   [phone]            Apr 12, 2017 10:40 AM CDT   Established Patient Visit with MD Gaurav Lewis - Arrhythmia ([facility] )    [address] [facility] [id number] [phone]            May 22, 2017 11:00 AM CDT   Anticoagulation with Del Moran - Coumadin ([facility])    [address] [facility] [place] [address]   [phone]            Jun 20, 2017  8:00 AM CDT   Remote Interrogation with HOME MONITOR DEVICE CHECK, [facility]   [facility] [place] - Arrhythmia ([facility] )    [address]  [facility] [id number] [phone]              Follow-up Information     Follow up with Zan Hogan MD.    Specialty:  Otolaryngology    Why:  Mastoiditis     Contact information:    [address]  [facility] [address]  [phone]          Follow up with [facility] VASCULAR NEUROLOGY In 1 month.    Specialty:  Vascular Neurology    Contact information:    [address]  [facility] [address]  [phone]      Referrals     Future Orders    Ambulatory Referral to ENT     Ambulatory Referral to Physical/Occupational Therapy     Questions:    Post Surgical?:  No    Eval and Treat:  Yes    Duration:      Frequency (times per week):      Precautions:      Location:      OT Location:      Restore Functional ADL Training?:      Gait Training:      Therapeutic Exercises:      Wound Care:      Traction:      Electric Stimulation:      IONTO.:      U/S:      Developmental Stimulation?:      Specialty Programs:          Discharge Instructions     Future Orders    Activity as tolerated     Call MD for:  increased confusion or weakness     Call MD for:  persistent dizziness, light-headedness, or visual disturbances     Call MD for:  severe persistent headache         Discharge Instructions       Have coumadin level drawn within 3 days of discharge. Discuss modifying your coumadin dose with Dr. Rocha based on the level.       Primary Diagnosis     Your primary diagnosis was:  Stroke      Admission Information     Date & Time Provider Department [facility]    3/31/2017  2:50 PM Libby Christine MD [facility] [phone]      Care Providers     Provider Role Specialty Primary office phone    Libby Christine MD Attending Provider Neurology [phone]    Sascha Salazar MD Team Attending  Neuro Critical Care [phone]    Deepak Shipman MD Team Attending  Neuro Critical Care [phone]    Jovan Rodriges MD Team Attending  Interventional Neurology [phone]    Sloan Michelle MD Team Attending  Neurology [phone]      Your Vitals Were     BP Pulse Temp Resp Height Weight    142/82 (BP Location: Right arm, Patient Position: Lying, BP Method: Automatic) 61 97.4 °F  "(36.3 °C) (Oral) 18 6' 1" (1.854 m) 85.3 kg (188 lb)    SpO2 BMI             99% 24.8 kg/m2         Recent Lab Values        3/31/2017                           3:42 PM           A1C 5.2           Comment for A1C at  3:42 PM on 3/31/2017:  According to ADA guidelines, hemoglobin A1C <7.0% represents  optimal control in non-pregnant diabetic patients.  Different  metrics may apply to specific populations.   Standards of Medical Care in Diabetes - 2016.  For the purpose of screening for the presence of diabetes:  <5.7%     Consistent with the absence of diabetes  5.7-6.4%  Consistent with increasing risk for diabetes   (prediabetes)  >or=6.5%  Consistent with diabetes  Currently no consensus exists for use of hemoglobin A1C  for diagnosis of diabetes for children.        Allergies as of 4/2/2017        Reactions    Amlodipine     Intolerant secondary to dizziness    Dyazide [Triamterene-hydrochlorothiazid]     Significant weakness with his diuretic.      Advance Directives     An advance directive is a document which, in the event you are no longer able to make decisions for yourself, tells your healthcare team what kind of treatment you do or do not want to receive, or who you would like to make those decisions for you.  If you do not currently have an advance directive, [facility] encourages you to create one.  For more information call:  [phone] ([phone]), [phone] ([phone]),  or log on to [email or web address].        Language Assistance Services     ATTENTION: Language assistance services are available, free of charge. Please call [phone].      ATENCIÓN: Si habla español, tiene a martinez disposición servicios gratuitos de asistencia lingüística. Llame al [phone].     CHÚ Ý: N?u b?n nói Ti?ng Vi?t, có các d?ch v? h? tr? ngôn ng? mi?n phí dành cho b?n. G?i s? [phone].        Stroke Education              Coumadin Discharge Instructions                          [facility] complies with applicable Federal civil rights laws and does not discriminate on the basis of race, color, national origin, age, disability, or sex.

## 2017-03-31 NOTE — TELEPHONE ENCOUNTER
Patients daughter called her father's dizzy, the last time this happened his medications were adjusted and she was concerrned. He is taking his medications for his BP without skipping any doses.   I asked her to check his Blood Pressure to see where it was right now. 161/99, he gets dizzy when he stands at times, 2 fingers on left hand -ring finger and pinky are tingling, HR 62.  I advised her to bring him to the ED(8/2016 last bp 124/79). Upon retaking his /92 on retake.

## 2017-03-31 NOTE — ED NOTES
Report called to CAIT Barnhart. To to be admitted to [facility]. Cardiac monitor called for.

## 2017-04-01 PROBLEM — H70.91 MASTOIDITIS OF RIGHT SIDE: Status: ACTIVE | Noted: 2017-04-01

## 2017-04-01 LAB
ALBUMIN SERPL BCP-MCNC: 2.9 G/DL
ALP SERPL-CCNC: 143 U/L
ALT SERPL W/O P-5'-P-CCNC: 32 U/L
ANION GAP SERPL CALC-SCNC: 3 MMOL/L
APTT BLDCRRT: 36.4 SEC
AST SERPL-CCNC: 35 U/L
BASOPHILS # BLD AUTO: 0.02 K/UL
BASOPHILS NFR BLD: 0.3 %
BILIRUB SERPL-MCNC: 1.1 MG/DL
BUN SERPL-MCNC: 11 MG/DL
CALCIUM SERPL-MCNC: 8.6 MG/DL
CHLORIDE SERPL-SCNC: 107 MMOL/L
CK MB SERPL-MCNC: 1.3 NG/ML
CK MB SERPL-RTO: 2.4 %
CK SERPL-CCNC: 55 U/L
CO2 SERPL-SCNC: 27 MMOL/L
CREAT SERPL-MCNC: 0.9 MG/DL
DIASTOLIC DYSFUNCTION: NO
DIFFERENTIAL METHOD: ABNORMAL
EOSINOPHIL # BLD AUTO: 0.3 K/UL
EOSINOPHIL NFR BLD: 4.2 %
ERYTHROCYTE [DISTWIDTH] IN BLOOD BY AUTOMATED COUNT: 13.6 %
EST. GFR  (AFRICAN AMERICAN): >60 ML/MIN/1.73 M^2
EST. GFR  (NON AFRICAN AMERICAN): >60 ML/MIN/1.73 M^2
ESTIMATED AVG GLUCOSE: 103 MG/DL
ESTIMATED PA SYSTOLIC PRESSURE: 20
GLUCOSE SERPL-MCNC: 85 MG/DL
HBA1C MFR BLD HPLC: 5.2 %
HCT VFR BLD AUTO: 39 %
HGB BLD-MCNC: 13.7 G/DL
INR PPP: 1.6
LYMPHOCYTES # BLD AUTO: 2 K/UL
LYMPHOCYTES NFR BLD: 32.4 %
MAGNESIUM SERPL-MCNC: 1.9 MG/DL
MCH RBC QN AUTO: 33.4 PG
MCHC RBC AUTO-ENTMCNC: 35.1 %
MCV RBC AUTO: 95 FL
MONOCYTES # BLD AUTO: 0.6 K/UL
MONOCYTES NFR BLD: 10.1 %
NEUTROPHILS # BLD AUTO: 3.2 K/UL
NEUTROPHILS NFR BLD: 52.7 %
PHOSPHATE SERPL-MCNC: 2.6 MG/DL
PLATELET # BLD AUTO: 140 K/UL
PMV BLD AUTO: 9.7 FL
POTASSIUM SERPL-SCNC: 4 MMOL/L
PROT SERPL-MCNC: 6 G/DL
PROTHROMBIN TIME: 16.5 SEC
RBC # BLD AUTO: 4.1 M/UL
RETIRED EF AND QEF - SEE NOTES: 55 (ref 55–65)
SODIUM SERPL-SCNC: 137 MMOL/L
TRICUSPID VALVE REGURGITATION: NORMAL
TROPONIN I SERPL DL<=0.01 NG/ML-MCNC: 0.02 NG/ML
WBC # BLD AUTO: 6.12 K/UL

## 2017-04-01 PROCEDURE — G8996 SWALLOW CURRENT STATUS: HCPCS | Mod: CH

## 2017-04-01 PROCEDURE — 25000003 PHARM REV CODE 250: Performed by: PHYSICIAN ASSISTANT

## 2017-04-01 PROCEDURE — 85025 COMPLETE CBC W/AUTO DIFF WBC: CPT

## 2017-04-01 PROCEDURE — 97165 OT EVAL LOW COMPLEX 30 MIN: CPT

## 2017-04-01 PROCEDURE — 97803 MED NUTRITION INDIV SUBSEQ: CPT

## 2017-04-01 PROCEDURE — 92523 SPEECH SOUND LANG COMPREHEN: CPT

## 2017-04-01 PROCEDURE — 97161 PT EVAL LOW COMPLEX 20 MIN: CPT

## 2017-04-01 PROCEDURE — 25000003 PHARM REV CODE 250: Performed by: INTERNAL MEDICINE

## 2017-04-01 PROCEDURE — 97530 THERAPEUTIC ACTIVITIES: CPT

## 2017-04-01 PROCEDURE — 93306 TTE W/DOPPLER COMPLETE: CPT | Mod: 26,,, | Performed by: INTERNAL MEDICINE

## 2017-04-01 PROCEDURE — 11000001 HC ACUTE MED/SURG PRIVATE ROOM

## 2017-04-01 PROCEDURE — 80053 COMPREHEN METABOLIC PANEL: CPT

## 2017-04-01 PROCEDURE — G8988 SELF CARE GOAL STATUS: HCPCS | Mod: CJ

## 2017-04-01 PROCEDURE — 99233 SBSQ HOSP IP/OBS HIGH 50: CPT | Mod: GC,,, | Performed by: PSYCHIATRY & NEUROLOGY

## 2017-04-01 PROCEDURE — 36415 COLL VENOUS BLD VENIPUNCTURE: CPT

## 2017-04-01 PROCEDURE — G8998 SWALLOW D/C STATUS: HCPCS | Mod: CH

## 2017-04-01 PROCEDURE — G8997 SWALLOW GOAL STATUS: HCPCS | Mod: CH

## 2017-04-01 PROCEDURE — 84484 ASSAY OF TROPONIN QUANT: CPT

## 2017-04-01 PROCEDURE — 82553 CREATINE MB FRACTION: CPT

## 2017-04-01 PROCEDURE — 85610 PROTHROMBIN TIME: CPT

## 2017-04-01 PROCEDURE — 63600175 PHARM REV CODE 636 W HCPCS: Performed by: PHYSICIAN ASSISTANT

## 2017-04-01 PROCEDURE — 93306 TTE W/DOPPLER COMPLETE: CPT

## 2017-04-01 PROCEDURE — 83735 ASSAY OF MAGNESIUM: CPT

## 2017-04-01 PROCEDURE — G8987 SELF CARE CURRENT STATUS: HCPCS | Mod: CK

## 2017-04-01 PROCEDURE — 84100 ASSAY OF PHOSPHORUS: CPT

## 2017-04-01 PROCEDURE — 85730 THROMBOPLASTIN TIME PARTIAL: CPT

## 2017-04-01 PROCEDURE — 92610 EVALUATE SWALLOWING FUNCTION: CPT

## 2017-04-01 RX ORDER — WARFARIN SODIUM 5 MG/1
5 TABLET ORAL DAILY
Status: DISCONTINUED | OUTPATIENT
Start: 2017-04-01 | End: 2017-04-02 | Stop reason: HOSPADM

## 2017-04-01 RX ORDER — TAMSULOSIN HYDROCHLORIDE 0.4 MG/1
0.4 CAPSULE ORAL DAILY
Status: DISCONTINUED | OUTPATIENT
Start: 2017-04-01 | End: 2017-04-02 | Stop reason: HOSPADM

## 2017-04-01 RX ORDER — LISINOPRIL 20 MG/1
20 TABLET ORAL DAILY
Status: DISCONTINUED | OUTPATIENT
Start: 2017-04-01 | End: 2017-04-02 | Stop reason: HOSPADM

## 2017-04-01 RX ADMIN — Medication 3 ML: at 05:04

## 2017-04-01 RX ADMIN — WARFARIN SODIUM 5 MG: 5 TABLET ORAL at 05:04

## 2017-04-01 RX ADMIN — LISINOPRIL 20 MG: 20 TABLET ORAL at 12:04

## 2017-04-01 RX ADMIN — METOPROLOL SUCCINATE 50 MG: 50 TABLET, EXTENDED RELEASE ORAL at 09:04

## 2017-04-01 RX ADMIN — GUANFACINE HYDROCHLORIDE 1 MG: 1 TABLET ORAL at 09:04

## 2017-04-01 RX ADMIN — ASPIRIN 81 MG CHEWABLE TABLET 81 MG: 81 TABLET CHEWABLE at 09:04

## 2017-04-01 RX ADMIN — ATORVASTATIN CALCIUM 40 MG: 20 TABLET, FILM COATED ORAL at 09:04

## 2017-04-01 RX ADMIN — ENOXAPARIN SODIUM 40 MG: 100 INJECTION SUBCUTANEOUS at 05:04

## 2017-04-01 RX ADMIN — TAMSULOSIN HYDROCHLORIDE 0.4 MG: 0.4 CAPSULE ORAL at 12:04

## 2017-04-01 RX ADMIN — CLOPIDOGREL 75 MG: 75 TABLET, FILM COATED ORAL at 09:04

## 2017-04-01 NOTE — PT/OT/SLP EVAL
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    Diet recommendations: Solid Diet Level: Regular  Liquid Diet Level: Thin general aspiration precautions    SLP Treatment Date: 17  Speech Start Time: 1430     Speech Stop Time: 1452     Speech Total (min): 22 min       TREATMENT BILLABLE MINUTES:  Eval 16  and Eval Swallow and Oral Function 8    Diagnosis: Embolic stroke involving left cerebellar artery      Past Medical History:   Diagnosis Date    *Atrial fibrillation     Allergy     Arthritis     Atrial fibrillation     Basal cell carcinoma 10/12/10    left earlobe    Basal cell carcinoma 2014    right neck    Cancer     Hx of Squamous Cell CA Scalp    Conductive hearing loss, bilateral     Chronic Eustachian Tube Dysfunction    Frequency of urination     Gastroenteritis     Headache(784.0)     History of colonoscopy     reportedly normal in .    Hypertension     Kidney stones     Otitis media     SQUAMOUS CELL CARCINOMA 07    SCC ant scalp    Supraventricular tachycardia      Past Surgical History:   Procedure Laterality Date    ADENOIDECTOMY      Cardiac pacemaker implantation      COLONOSCOPY      Duputyren Contracture Right Hand      INSERT / REPLACE / REMOVE PACEMAKER      Resection of Skin Cancer Scalp      TONSILLECTOMY         Has the patient been evaluated by SLP for swallowing? : Yes  Keep patient NPO?: No   General Precautions: Standard, aspiration, fall          Social Hx: Patient lives with wife.    Prior diet: regular/thins    Occupational/hobbies/homemaking: Pt is a retired. He reports still driving, but that wife manages medications and finances.    Subjective:  "My swallowing's fine. I eat everything."    Pain Ratin/10                   Objective:        Oral Musculature Evaluation  Oral Musculature: WFL  Dentition: present and adequate  Mucosal Quality: " good  Mandibular Strength and Mobility: WFL  Oral Labial Strength and Mobility: WFL  Lingual Strength and Mobility: WFL  Velar Elevation: WFL  Buccal Strength and Mobility: WFL  Volitional Cough: WFL  Volitional Swallow: elicited; WFL  Voice Prior to PO Intake: dry, clear; WFL     Cognitive Status:  Behavioral Observations: alert, appropriate and cooperative-  Memory and Orientation: O x 4, Recall of general information was good. Pt immediately recalled up to 7 digits in a series. Following a delay, pt recalled 3/3 unrelated words.   Attention: adequate for evaluation  Problem Solving: Responses to problem solving q's were appropriate. Pt compared and contrasted 2 given items appropriately. Multiple causes for a problem situation were generated with good ability. Pt also completed a 4 word sequencing task ind'ly.   Pragmatics: [facility]  Executive Function: [facility]      Auditory Comprehension: answers yes/no questions and able to follow commands    Verbal Expression: [facility]    Motor Speech: WFL    Voice: [facility]    Reading: oral reading ability [facility]    Writing: [facility]    Visual-Spatial: [facility]      Bedside Swallow Eval:  Consistencies Assessed: Thin liquids multiple cup and straw sips and Solids cracker  Oral Phase: WNL  Pharyngeal Phase: no overt clinical  signs/symptoms of aspiration and no overt clinical signs/symptoms of pharyngeal dysphagia    Additional Treatment:      FIM:  Social Interaction: 7 Complete Dare--The patient interacts appropriately with staff, other patients, and family members (e.g., controls temper, accepts criticism, is aware that words and actions have an impact on others), and does not require medication for   Problem Solvin Modified Dare--In most situations, the patient recognizes a present problem, and with only mild difficulty makes appropriate decisions, initiates and carries out a sequence of steps to solve complex problems, or requires more than a   Comprehension: 7 Complete  Dixie--The patient understand complex or abstract directions and conversation, and understand either spoken or written language (not necessarily English).   Expression: 7 Complete Dixie--The patient expresses complex or abstract ideas clearly and fluently (not necessarily in English).   Memory: 7 Complete Dixie--The patient recognizes people frequently encountered, remebers daily routines, and executes requests of others without need for repetition.     Assessment:  Roby Rodriguez is a 86 y.o. male. Speech, language, cognition, and swallowing abilities appear to be WFL and at baseline. Pt and family deny any changes in any of these areas.       Discharge recommendations: Discharge Facility/Level Of Care Needs:  (no further ST services upon d/c)     Goals:   SLP Goals     Not on file           Plan:   Patient to be seen     Plan of Care expires:    Plan of Care reviewed with: patient, spouse, daughter  SLP Follow-up?: No         SLP G-Codes  Functional Assessment Tool Used: noms  Score: 7  Functional Limitations: Swallowing  Swallow Current Status ():   Swallow Goal Status ():   Swallow Discharge Status ():     EMILIANO Guy, CCC-SLP  04/01/2017      EMILIANO Guy, CCC-SLP  Speech Language Pathologist  [phone]  4/1/2017

## 2017-04-01 NOTE — ASSESSMENT & PLAN NOTE
-Likely etiology of stroke as INR 1.7 on admit   -Restart coumadin today   -cont toprol 50 mg daily

## 2017-04-01 NOTE — ASSESSMENT & PLAN NOTE
- Risk factor for stroke  - SBP< 180  - home meds: toprol 50 mg daily and lisinopril 20 mg BID  - cont toprol 50 mg daily, restart lisinopril 20 mg daily, titrate up prn

## 2017-04-01 NOTE — ASSESSMENT & PLAN NOTE
-2/2 stroke  -Could have component of ear infection/ dysfunction as imaging showed R mastoiditis, opacification of R mastoid air cells and middle ear  -ENT consulted

## 2017-04-01 NOTE — ASSESSMENT & PLAN NOTE
Likely cerebellum stroke  Limb ataxia  Etiology likely cardioembolic but need to have complete stroke work up     Antiplatelets: ASA 325mg qd and plavix 75mg qd, coumadin  Statin: starting atorvastatin 40mg qhs for LDL> 70  DVT prophylaxis: enoxaparin 40mg qhs   Diagnostics pending: N/A  Disposition: outpatient PT/OT; likely discharge in the morning

## 2017-04-01 NOTE — SUBJECTIVE & OBJECTIVE
Past Medical History:   Diagnosis Date    *Atrial fibrillation     Allergy     Arthritis     Atrial fibrillation     Basal cell carcinoma 10/12/10    left earlobe    Basal cell carcinoma 7/23/2014    right neck    Cancer     Hx of Squamous Cell CA Scalp    Conductive hearing loss, bilateral     Chronic Eustachian Tube Dysfunction    Frequency of urination     Gastroenteritis     Headache(784.0)     History of colonoscopy     reportedly normal in 2004.    Hypertension     Kidney stones     Otitis media     SQUAMOUS CELL CARCINOMA 7/27/07    SCC ant scalp    Supraventricular tachycardia      Past Surgical History:   Procedure Laterality Date    ADENOIDECTOMY      Cardiac pacemaker implantation      COLONOSCOPY      Duputyren Contracture Right Hand      INSERT / REPLACE / REMOVE PACEMAKER      Resection of Skin Cancer Scalp      TONSILLECTOMY       Family History   Problem Relation Age of Onset    Melanoma Neg Hx     Psoriasis Neg Hx     Lupus Neg Hx     Eczema Neg Hx      Social History   Substance Use Topics    Smoking status: Never Smoker    Smokeless tobacco: Never Used      Comment: The patient is becoming more active.    Alcohol use 3.5 oz/week     7 Standard drinks or equivalent per week     Review of patient's allergies indicates:   Allergen Reactions    Amlodipine      Intolerant secondary to dizziness    Dyazide [triamterene-hydrochlorothiazid]      Significant weakness with his diuretic.     Medications: I have reviewed the current medication administration record.    Prescriptions Prior to Admission   Medication Sig Dispense Refill Last Dose    antipyrine-benzocaine (AURALGAN OR EQUIV) 5.4-1.4 % Drop PLACE 3 DROPS INTO BOTH EARS 4 (FOUR) TIMES DAILY. 42 mL 4 Taking    guanfacine (TENEX) 1 MG Tab Take 1 tablet (1 mg total) by mouth every evening. 90 tablet 3 Taking    lisinopril (PRINIVIL,ZESTRIL) 20 MG tablet Take 1 tablet (20 mg total) by mouth 2 (two) times daily.  180 tablet 3 Taking    metoprolol succinate (TOPROL-XL) 50 MG 24 hr tablet TAKE 1 TABLET ONCE DAILY 90 tablet 0 Taking    warfarin (COUMADIN) 5 MG tablet Take 0.5-1 tablets (2.5-5 mg total) by mouth Daily. Take 1/2 to 1 tablet every evening as instructed by [facility]madin Clinic 80 tablet 3 Taking       Review of Systems   Constitutional: Negative for chills and fever.   HENT: Positive for hearing loss. Negative for drooling.    Eyes: Negative for photophobia and pain.   Respiratory: Negative for cough and shortness of breath.    Cardiovascular: Negative for leg swelling.   Gastrointestinal: Negative for constipation and diarrhea.   Endocrine: Positive for cold intolerance. Negative for heat intolerance.   Genitourinary: Positive for difficulty urinating.   Musculoskeletal: Positive for arthralgias and gait problem.   Skin: Negative for pallor and rash.   Neurological: Positive for dizziness. Negative for headaches.   Psychiatric/Behavioral: Negative for agitation and confusion.     Objective:     Vital Signs (Most Recent):  Temp: 97.9 °F (36.6 °C) (03/31/17 1449)  Pulse: 67 (03/31/17 1900)  Resp: 20 (03/31/17 1755)  BP: (!) 165/108 (03/31/17 1755)  SpO2: (!) 89 % (03/31/17 1755)    Vital Signs Range (Last 24H):  Temp:  [97.9 °F (36.6 °C)]   Pulse:  [67-73]   Resp:  [16-20]   BP: (165-167)/()   SpO2:  [89 %-97 %]     Physical Exam   Constitutional: He appears well-developed and well-nourished.   HENT:   Head: Normocephalic and atraumatic.   Eyes: EOM are normal. Pupils are equal, round, and reactive to light.   Neck: Normal range of motion.   Cardiovascular: Normal rate and regular rhythm.    Pulmonary/Chest: Effort normal. No respiratory distress.   Abdominal: Soft. He exhibits no distension.   Musculoskeletal: Normal range of motion.   Neurological:   See below   Skin: Skin is warm and dry.   Psychiatric: He has a normal mood and affect.       Neurological Exam:   LOC: alert and follows requests  Language: No  aphasia  Speech: No dysarthria  Orientation: Person, Place, Time  Memory: Recent memory intact, Remote memory intact, Age correct, Month correct  Visual Fields (CN II): Full  EOM (CN III, IV, VI): Full/intact  Oculocephalics: normal  Pupils (CN III, IV, VI): PERRL  Facial Sensation (CN V): Symmetric  Facial Movement (CN VII): symmetric facial expression  Hearing (CN VIII): + hearing loss  Gag Reflex (CN IX, X): not done  Shoulder/Neck (CN XI): SCM-Left: Normal ; SCM-Right: Normal ; Shoulder Shrug: Normal/Symetric  Tongue (CN XII): not examined  Reflexes: not examined  Motor*: Arm Left:  Normal (5/5), Leg Left:   Paretic:  4/5, Arm Right:   Normal (5/5), Leg Right:   Paretic:  4/5  Cerebellar*:abnormal finger to nose left side and heel to shin left side   Sensation: intact  Tone: Arm-Left: normal; Leg-Left: normal; Arm-Right: normal; Leg-Right: normal    Stroke Team Times:   Date and Time Taken: 3/31/2017 8:26 PM  Last Known Normal Date and Time : 3/31/590574:00  Symptom Onset Date and Time:3/31/271981:00  Stroke Team Called Date and Time: 3/31/229872:10  Stroke Team Arrived Date and Time: 3/31/351756:20  CT Interpretation Time: 15:33  Intervention decision time: no intervention.  NIH Stroke Scale:  Interval: baseline (upon arrival/admit)  Level of Consciousness: 0 - alert  LOC Questions: 0 - answers both correctly  LOC Commands: 0 - performs both correctly  Best Gaze: 0 - normal  Visual: 0 - no visual loss  Facial Palsy: 0 - normal  Motor Left Arm: 0 - no drift  Motor Right Arm: 0 - no drift  Motor Left Le - no drift  Motor Right Le - no drift  Limb Ataxia: 2 - present in two limbs (Left sided )  Sensory: 0 - normal  Best Language: 0 - no aphasia  Dysarthria: 0 - normal articulation  Extinction and Inattention: 0 - no neglect  NIH Stroke Scale Total: 2  Modified Mono Scale:   Timeline: Prior to symptoms onset  Modified Mono Score: 1 - no significant disability        Laboratory:  CMP:   Recent Labs  Lab  03/31/17  1528   CALCIUM 9.0   ALBUMIN 3.3*   PROT 6.9      K 4.2   CO2 21*      BUN 13   CREATININE 1.0   ALKPHOS 164*   ALT 36   AST 43*   BILITOT 1.0     CBC:   Recent Labs  Lab 03/31/17  1528   WBC 7.19   RBC 4.40*   HGB 14.6   HCT 43.2      MCV 98   MCH 33.2*   MCHC 33.8     Lipid Panel:   Recent Labs  Lab 03/31/17  1528   CHOL 167   LDLCALC 99.4   HDL 50   TRIG 88     Hgb A1C: No results for input(s): HGBA1C in the last 168 hours.  TSH:   Recent Labs  Lab 03/31/17  1528   TSH 1.750       Diagnostic Results:  Brain Imaging:   CT head 03/31/17  -No acute intracranial abnormalities

## 2017-04-01 NOTE — SUBJECTIVE & OBJECTIVE
Neurologic Chief Complaint: gait instability    Subjective:     Interval History: Patient is seen for follow-up neurological assessment and treatment recommendations: JOCY. CTA engative for LVO, does show R sided mastoiditis.     HPI, Past Medical, Family, and Social History remains the same as documented in the initial encounter.     Review of Systems   Constitutional: Negative for chills and fever.   HENT: Positive for hearing loss. Negative for drooling.    Eyes: Negative for photophobia, pain and visual disturbance.   Respiratory: Negative for cough and shortness of breath.    Cardiovascular: Negative for leg swelling.   Gastrointestinal: Negative for constipation and diarrhea.   Endocrine: Positive for cold intolerance. Negative for heat intolerance.   Musculoskeletal: Positive for arthralgias and gait problem.   Skin: Negative for pallor and rash.   Neurological: Positive for dizziness and facial asymmetry (present for years). Negative for syncope and headaches.   Psychiatric/Behavioral: Negative for agitation and confusion.     Scheduled Meds:   aspirin  81 mg Oral Daily    atorvastatin  40 mg Oral Daily    clopidogrel  75 mg Oral Daily    enoxaparin  40 mg Subcutaneous Daily    guanfacine  1 mg Oral Daily    metoprolol succinate  50 mg Oral Daily    sodium chloride 0.9%  3 mL Intravenous Q8H    warfarin  5 mg Oral Daily     Continuous Infusions:   sodium chloride 0.9%       PRN Meds:hydrALAZINE, sodium chloride 0.9%    Objective:     Vital Signs (Most Recent):  Temp: 97.9 °F (36.6 °C) (04/01/17 0858)  Pulse: 63 (04/01/17 0858)  Resp: 18 (04/01/17 0858)  BP: (!) 177/85 (04/01/17 0858)  SpO2: 95 % (04/01/17 0858)  BP Location: Left arm    Vital Signs Range (Last 24H):  Temp:  [97.9 °F (36.6 °C)-98.5 °F (36.9 °C)]   Pulse:  [58-73]   Resp:  [16-20]   BP: (140-177)/()   SpO2:  [89 %-97 %]   BP Location: Left arm    Physical Exam   Constitutional: He is oriented to person, place, and time. He  appears well-developed and well-nourished.   HENT:   Head: Normocephalic.   Bandage in place over left face C/D/I s/p skin cancer removal   Eyes: EOM are normal. Pupils are equal, round, and reactive to light.   Neck: Normal range of motion.   Cardiovascular: Normal rate.    Pulmonary/Chest: Effort normal. No respiratory distress. He has no wheezes.   Abdominal: Soft. Bowel sounds are normal. He exhibits no distension.   Musculoskeletal: Normal range of motion.   Neurological: He is alert and oriented to person, place, and time.   See below   Skin: Skin is warm and dry.   Psychiatric: He has a normal mood and affect.   Vitals reviewed.      Neurological Exam:   LOC: alert and follows requests  Language: No aphasia  Speech: No dysarthria  Orientation: Person, Place, Time  Visual Fields (CN II): Full  EOM (CN III, IV, VI): Full/intact  Facial Sensation (CN V): Symmetric  Facial Movement (CN VII): left upper and lower facial assymmetry (chronic)  Motor*: Arm Left:  Normal (5/5), Leg Left:   Normal (5/5), Arm Right:   Normal (5/5), Leg Right:   Normal (5/5)  Cerebellar*: Finger/Nose Abnormal - left upper, Heel/Shin Abnormal - left lower  Sensation: intact to light touch equally and bilaterally  Tone: Arm-Left: normal; Leg-Left: normal; Arm-Right: normal; Leg-Right: normal    Stroke Team Times:   Date and Time Taken: 3/31/2017 8:26 PM  Last Known Normal Date and Time : 3/31/532386:00  Symptom Onset Date and Time:3/31/454179:00  Stroke Team Called Date and Time: 3/31/383662:10  Stroke Team Arrived Date and Time: 3/31/422254:20  CT Interpretation Time: 15:33  Intervention decision time: no intervention.  NIH Stroke Scale:  Interval: baseline (upon arrival/admit)  Level of Consciousness: 0 - alert  LOC Questions: 0 - answers both correctly  LOC Commands: 0 - performs both correctly  Best Gaze: 0 - normal  Visual: 0 - no visual loss  Facial Palsy: 2 - partial  Motor Left Arm: 1 - drift  Motor Right Arm: 0 - no drift  Motor  Left Le - no drift  Motor Right Le - no drift  Limb Ataxia: 2 - present in two limbs (Left sided )  Sensory: 0 - normal  Best Language: 0 - no aphasia  Dysarthria: 0 - normal articulation  Extinction and Inattention: 0 - no neglect  NIH Stroke Scale Total: 5      Laboratory:  CMP:   Recent Labs  Lab 17  0502   CALCIUM 8.6*   ALBUMIN 2.9*   PROT 6.0      K 4.0   CO2 27      BUN 11   CREATININE 0.9   ALKPHOS 143*   ALT 32   AST 35   BILITOT 1.1*     CBC:   Recent Labs  Lab 17  0502   WBC 6.12   RBC 4.10*   HGB 13.7*   HCT 39.0*   *   MCV 95   MCH 33.4*   MCHC 35.1     Lipid Panel:   Recent Labs  Lab 17  1528   CHOL 167   LDLCALC 99.4   HDL 50   TRIG 88     Coagulation:   Recent Labs  Lab 17  0502   INR 1.6*   APTT 36.4*     Hgb A1C: No results for input(s): HGBA1C in the last 168 hours.    Diagnostic Results:  CT head 3/31: No acute abnormality. Findings of chronic microvascular ischemic disease. Mild sinus disease and opacification of the right mastoid air cells and middle ear.    CTA head and neck 3/31:Approximately 50% luminal narrowing involving the posterior division of the right proximal M2 segment of the middle cerebral artery.  No evidence of occlusion or aneurysm. Age-appropriate senescent changes, and chronic vascular ischemic change. Right sided mastoiditis.

## 2017-04-01 NOTE — CONSULTS
Otolaryngology - Head and Neck Surgery  Consultation Report    Consultation From: Vascular Neurology     Chief Complaint: dizziness     History of Present Illness: 86 y.o. year old maleoriginally presented with unsteady gait found to have embolic stroke involving left cerebellar artery. Incidental finding of right mastoid opacification on CT head imaging. Patient denies otalgia, otorrhea, acute hearing changes, vertigo, aural fullness. +tinnitus. Patient long standing history of hearing loss, uses ITC amplification. No prior history of ear surgery      Review of Systems - Negative except as above     Past Medical History: Patient has a past medical history of *Atrial fibrillation; Allergy; Arthritis; Atrial fibrillation; Basal cell carcinoma (10/12/10); Basal cell carcinoma (7/23/2014); Cancer; Conductive hearing loss, bilateral; Frequency of urination; Gastroenteritis; Headache(784.0); History of colonoscopy; Hypertension; Kidney stones; Otitis media; SQUAMOUS CELL CARCINOMA (7/27/07); and Supraventricular tachycardia.    Past Surgical History: Patient has a past surgical history that includes Resection of Skin Cancer Scalp; Duputyren Contracture Right Hand; Tonsillectomy; Adenoidectomy; Colonoscopy; Cardiac pacemaker implantation; and Insert / replace / remove pacemaker.    Social History: Patient reports that he has never smoked. He has never used smokeless tobacco. He reports that he drinks about 3.5 oz of alcohol per week  He reports that he does not use illicit drugs.    Family History: family history is negative for Melanoma, Psoriasis, Lupus, and Eczema.    Medications:    aspirin  81 mg Oral Daily    atorvastatin  40 mg Oral Daily    clopidogrel  75 mg Oral Daily    enoxaparin  40 mg Subcutaneous Daily    guanfacine  1 mg Oral Daily    lisinopril  20 mg Oral Daily    metoprolol succinate  50 mg Oral Daily    sodium chloride 0.9%  3 mL Intravenous Q8H    tamsulosin  0.4 mg Oral Daily    warfarin  5  mg Oral Daily       Allergies: Patient is allergic to amlodipine and dyazide [triamterene-hydrochlorothiazid].    Physical Exam:  Temp:  [97.9 °F (36.6 °C)-98.5 °F (36.9 °C)] 98 °F (36.7 °C)  Pulse:  [58-73] 61  Resp:  [16-20] 18  SpO2:  [89 %-97 %] 95 %  BP: (135-177)/() 135/73        Constitutional: Well appearing / communicating.  NAD.  Eyes: EOM I Bilaterally  Head/Face: Normocephalic.  Negative paranasal sinus pressure/tenderness.  Salivary glands WNL.  House Brackmann I Bilaterally.    Right Ear: External Auditory Canal WNL,TM w/o masses/lesions/perforations.  Auricle WNL. No mastoid tenderness.   Left Ear: External Auditory Canal WNL,TM w/o masses/lesions/perforations. Auricle WNL. No mastoid tenderness.   Nose: No gross nasal septal deviation. Inferior Turbinates 2+ bilaterally. No septal perforation. No masses/lesions. External nasal skin without masses/lesions.  Oral Cavity: Gingiva/lips WNL.  FOM Soft, no masses palpated. Oral Tongue mobile. Hard Palate WNL.   Oropharynx: BOT WNL. No masses/lesions noted. Tonsillar fossa/pharyngeal wall without lesions. Posterior oropharynx WNL.  Soft palate without masses. Midline uvula.   Neck/Lymphatic: No LAD I-VI bilaterally.  No thyromegaly.  No masses noted on exam.  Neuro/Psychiatric: AOx3.  Normal mood and affect.   Cardiovascular: Normal carotid pulses bilaterally, no increasing jugular venous distention noted at cervical region bilaterally.    Respiratory: Normal respiratory effort, no stridor, no retractions noted.    Audiogram 2014    VNG 2013    VNG/Postuography Evaluation     The patient complains of heavy-headedness, presyncope. Symptoms are provoked by driving. These have been recurring. The patient's complaint has recently occurred over the past 3 weeks but he reports that he's had problems for the past couple of years.     Gaze nystagmus was absent.     Oculomotor function was normal.     Clinically insignificant oblique (4 d/s down-beating + 3  d/s right-beating) spontaneous nystagmus.     Static positional testing provoked a direction-fixed right beating static positional nystagmus (9 d/s in head back; 8 d/s in head left).     Unilateral Weakness: 2 % (left ear)     Directional Preponderance 19% (right beating)     RC: 9 d/s   d/s  RW: 20 d/s  LW: 14 d/s     Fixation suppression was normal.     Sensory Organization Test indicated poor use of vestibular cues in maintaining balance.      Motor Control Test was normal.     Impression: Mixed abnormality: probably central vestibular-ocular reflex abnormality and a vestibulo-spinal (utricle) abnormality.     Recommend: formal Risk of Falls assessment and formal vestibular rehab.    CBC    Recent Labs  Lab 17  0502   WBC 6.12   HGB 13.7*   HCT 39.0*   MCV 95   *     BMP    Recent Labs  Lab 17  0502   GLU 85      K 4.0      CO2 27   BUN 11   CREATININE 0.9   CALCIUM 8.6*   MG 1.9       Imaging Results         CTA Neck (In process)         CTA Head and Neck (xpd) (Final result) Result time:  17 18:25:01    Procedure changed from CTA Head        Final result by Sloan Muhammad MD (17 18:25:01)    Impression:      Approximately 50% luminal narrowing involving the posterior division of the right proximal M2 segment of the middle cerebral artery.  No evidence of occlusion or aneurysm.    Age-appropriate senescent changes, and chronic vascular ischemic change.    Right sided mastoiditis.    ______________________________________     Electronically signed by resident: SLOAN MUHAMMAD MD  Date:     17  Time:    18:03            As the supervising and teaching physician, I personally reviewed the images and resident's interpretation and I agree with the findings.          Electronically signed by: RAOUL BELTRAN MD  Date:     17  Time:    18:25     Narrative:    CTA   17 17:35:53    Accession# [id number]    CLINICAL INDICATION: 86 year old M with Vessel stenosis       TECHNIQUE:   Axial images obtained throughout the region of the head without the use of intravenous contrast.    Axial images obtained throughout the region of the head and neck during intravenous bolus injection of iodinated contrast via CT angiogram protocol.  The patient received 100 cc of Omni 350 IV.  Axial, sagittal, and coronal MIP reconstructions were performed.     COMPARISON: CT head 03/31/2017    FINDINGS:     CT Brain:  There is generalized vertebral volume loss, with compensatory enlargement of the ventricular system.  There is no loss of gray-white differentiation to suggest an acute infarction.  There is no evidence of intraparenchymal hemorrhage.  There are scattered supratentorial white matter changes, consistent with chronic microvascular ischemic change.    There are no extra-axial fluid collections or masses.    Skull appears intact.  There is mild mucosal thickening within the left maxillary sinus.  There is opacification of the right mastoid air cells and middle ear.    CTA:  The aortic arch maintains a normal branching pattern. The common and internal carotid arteries are normal in course and caliber. No significant stenosis in either carotid bifurcation, noting mild calcific coronary atherosclerosis.     The vertebral origins are patent. The cervical vertebral arteries are normal in course and caliber. Vertebrobasilar system is within normal limits without focal abnormality.  There is a dominant left vertebral artery.    There is approximately 50% luminal narrowing involving the posterior division of the right proximal M2.  The remaining middle cerebral arteries are within normal limits.  The anterior  and posterior cerebral arteries are within normal limits, without evidence of significant stenosis, focal occlusion, or intracranial aneurysm formation.    Soft tissue structures of the neck demonstrate no significant abnormality.  The lung apices demonstrate mild apical scarring  bilaterally.            X-Ray Chest AP Portable (Final result) Result time:  03/31/17 16:22:39    Final result by Jevon Palomares III, MD (03/31/17 16:22:39)    Impression:     No acute process seen.      Electronically signed by: JEVON PALOMARES  Date:     03/31/17  Time:    16:22     Narrative:    Overview: There is a pacer, cardiomegaly, and DJD. Lungs are clear.            CT Head Without Contrast (Final result) Result time:  03/31/17 18:02:13    Final result by Kezia Escobedo MD (03/31/17 18:02:13)    Impression:        No acute abnormality. Findings of chronic microvascular ischemic disease.    Mild sinus disease and opacification of the right mastoid air cells and middle ear.      Preliminary report discussed with ELADIA MCDONALD by Dr. Escobedo at 15:41:48 on Friday, March 31, 2017.  ______________________________________     Electronically signed by resident: KEZIA ESCOBEDO MD  Date:     03/31/17  Time:    16:03            As the supervising and teaching physician, I personally reviewed the images and resident's interpretation and I agree with the findings.          Electronically signed by: ELSIE LEUNG  Date:     03/31/17  Time:    18:02     Narrative:    CLINICAL INDICATION: 86 year old M with Stroke     TECHNIQUE: CT head without contrast. Axial CT images obtained throughout the head without intravenous contrast. Sagittal and coronal reconstructions were performed.    COMPARISON: No priors.    FINDINGS:    Intracranial compartment:    Ventricles and sulci are normal in size for age without evidence of hydrocephalus. No extra-axial blood or fluid collections.    The brain parenchyma appears normal. No parenchymal mass, hemorrhage, edema or major vascular distribution infarct.  A small hypodense region within the left basal ganglia most likely correlates with a prominent perivascular space versus remote lacunar infarct. Patchy and confluent periventricular white matter hypoattenuation likely relates  to sequela of chronic microvascular ischemic disease.    Skull/extracranial contents (limited evaluation): No fracture. Nodular mucosal thickening is present within the bilateral maxillary antra.  There is opacification of the right mastoid air cells and middle ear .  The remaining paranasal sinuses and left mastoid air cells are essentially clear.               Assessment: 86 y.o. year with embolic stroke involving left cerebellar artery. Incidental finding of right mastoid opacification on CT head imaging.     Plan:   Imaging reviewed, no evidence coalescent mastoiditis although the temporal bone resolution is rather poor. No clinical exam findings consistent with acute mastoiditis  No acute ENT intervention indicated   Follow up with Dr. Rowe or César as outpatient

## 2017-04-01 NOTE — PLAN OF CARE
Problem: Occupational Therapy Goal  Goal: Occupational Therapy Goal  Goals to be met by: 4/8/17     Patient will increase functional independence with ADLs by performing:    UE Dressing with Supervision.  LE Dressing with Supervision.  Grooming while standing with Supervision.  Toileting from toilet with Supervision for hygiene and clothing management.   Rolling to Bilateral with Modified [place].   Supine to sit with Supervision.  Stand pivot transfers with Supervision.  Outcome: Ongoing (interventions implemented as appropriate)  OT eval completed.

## 2017-04-01 NOTE — PLAN OF CARE
Problem: Fall Risk (Adult)  Goal: Identify Related Risk Factors and Signs and Symptoms  Related risk factors and signs and symptoms are identified upon initiation of Human Response Clinical Practice Guideline (CPG)   Outcome: Ongoing (interventions implemented as appropriate)  Pt will remain free of injury during hospital stay.

## 2017-04-01 NOTE — PT/OT/SLP EVAL
Occupational Therapy  Evaluation    Roby Rodriguez   MRN: [id number]   Admitting Diagnosis: Embolic stroke involving left cerebellar artery    OT Date of Treatment: 04/01/17   OT Start Time: 0822  OT Stop Time: 0854  OT Total Time (min): 32 min    Billable Minutes:  Evaluation 32    Diagnosis: Embolic stroke involving left cerebellar artery   Unsteady gait     Past Medical History:   Diagnosis Date    *Atrial fibrillation     Allergy     Arthritis     Atrial fibrillation     Basal cell carcinoma 10/12/10    left earlobe    Basal cell carcinoma 7/23/2014    right neck    Cancer     Hx of Squamous Cell CA Scalp    Conductive hearing loss, bilateral     Chronic Eustachian Tube Dysfunction    Frequency of urination     Gastroenteritis     Headache(784.0)     History of colonoscopy     reportedly normal in 2004.    Hypertension     Kidney stones     Otitis media     SQUAMOUS CELL CARCINOMA 7/27/07    SCC ant scalp    Supraventricular tachycardia       Past Surgical History:   Procedure Laterality Date    ADENOIDECTOMY      Cardiac pacemaker implantation      COLONOSCOPY      Duputyren Contracture Right Hand      INSERT / REPLACE / REMOVE PACEMAKER      Resection of Skin Cancer Scalp      TONSILLECTOMY         Referring physician: MD Alesha  Date referred to OT: 4/1/17    General Precautions: Standard, aspiration, fall (cardiac)    Do you have any cultural, spiritual, Voodoo conflicts, given your current situation?: Muslim     Patient History:  Living Environment  Living Environment Comment: Pt resides with spouse & daughter in [place] in 1 story house with 1 step to enter.  Pt has tall toilet & built-in shower bench in shower stall.    Prior level of function:   Bed Mobility/Transfers: independent  Grooming: independent  Bathing: independent  Upper Body Dressing: independent  Lower Body Dressing: independent  Toileting: independent  Driving License: Yes  Occupation: Retired  Type of  "Occupation: [employer]  Leisure and Hobbies: "doing nothing"  IADL Comments: Pt has tall toilet & built-in shower bench in shower stall.     Dominant hand: right    Subjective:  Communicated with RN prior to session.  Pt with no complaints.  Chief Complaint: decreased balance  Patient/Family stated goals: to get better    Pain Ratin/10  Pain Rating Post-Intervention: 0/10    Objective:  Patient found with: telemetry    Cognitive Exam:  Oriented to: Person, Place, Time and Situation  Follows Commands/attention: Easily distracted  Communication: clear/fluent  Memory:  No Deficits noted  Safety awareness/insight to disability: intact  Coping skills/emotional control: Appropriate to situation    Visual/perceptual:  Intact    Physical Exam:  Postural examination/scapula alignment: Rounded shoulder, Head forward, Posterior pelvic tilt and posterior leaning seated & standing  Skin integrity: bruising noted  Edema: None noted BUE    Sensation:   Intact  light/touch BUE    Upper Extremity Range of Motion:  Right Upper Extremity: WFL  Left Upper Extremity: WFL    Upper Extremity Strength:  Right Upper Extremity: WFL  Left Upper Extremity: WFL   Strength: WFL    Fine motor coordination:   Intact  Left hand thumb/finger opposition skills and Right hand thumb/finger opposition skills    Gross motor coordination: WFL    Functional Mobility:  Bed Mobility:  Supine to Sit: Stand by Assistance  Sit to Supine: Stand by Assistance    Transfers:  Sit <> Stand Assistance: Contact Guard Assistance (from EOB)  Sit <> Stand Assistive Device: No Assistive Device  Toilet Transfer Technique: Stand Pivot  Toilet Transfer Assistance: Contact Guard Assistance  Toilet Transfer Assistive Device: No Assistive Device    Functional Ambulation: Pt required Min (A) with functional mobility to bathroom & back.    Activities of Daily Living:     UE Dressing Level of Assistance: Minimum assistance (seated EOB)  LE Dressing Level of " "Assistance: Contact guard (seated on EOB)  Grooming Position: Standing at sink  Grooming Level of Assistance: Contact guard assistance  Toileting Where Assessed: Toilet  Toileting Level of Assistance: Contact guard (for standing balance)     Therapeutic Activities and Exercises:  Pt required SBA-Min (A) for postural control while seated EOB & Min (A) while standing.    AM-PAC 6 CLICK ADL  How much help from another person does this patient currently need?  1 = Unable, Total/Dependent Assistance  2 = A lot, Maximum/Moderate Assistance  3 = A little, Minimum/Contact Guard/Supervision  4 = None, Modified [place]/Independent    Putting on and taking off regular lower body clothing? : 3  Bathing (including washing, rinsing, drying)?: 3  Toileting, which includes using toilet, bedpan, or urinal? : 3  Putting on and taking off regular upper body clothing?: 3  Taking care of personal grooming such as brushing teeth?: 3  Eating meals?: 3  Total Score: 18    AM-PAC Raw Score CMS "G-Code Modifier Level of Impairment Assistance   6 % Total / Unable   7 - 9 CM 80 - 100% Maximal Assist   10 - 14 CL 60 - 80% Moderate Assist   15 - 19 CK 40 - 60% Moderate Assist   20 - 22 CJ 20 - 40% Minimal Assist   23 CI 1-20% SBA / CGA   24 CH 0% Independent/ Mod I       Patient left supine with all lines intact, call button in reach, bed alarm on, RN notified and white board updated.    Assessment:  Roby Rodriguez is a 86 y.o. male with a medical diagnosis of Embolic stroke involving left cerebellar artery and presents with decreased balance affecting all ADL's & mobility safety as pt is not currently at baseline mobility.  Pt would benefit from OT to address independence with ADL's.    Rehab identified problem list/impairments: Rehab identified problem list/impairments: weakness, impaired endurance, impaired self care skills, impaired functional mobilty, impaired balance, decreased coordination, decreased upper extremity " function, decreased lower extremity function, decreased safety awareness    Rehab potential is good.    Activity tolerance: Good    Discharge recommendations: Discharge Facility/Level Of Care Needs: outpatient OT     Equipment recommendations: walker, rolling     GOALS:   Occupational Therapy Goals        Problem: Occupational Therapy Goal    Goal Priority Disciplines Outcome Interventions   Occupational Therapy Goal     OT, PT/OT Ongoing (interventions implemented as appropriate)    Description:  Goals to be met by: 4/8/17     Patient will increase functional independence with ADLs by performing:    UE Dressing with Supervision.  LE Dressing with Supervision.  Grooming while standing with Supervision.  Toileting from toilet with Supervision for hygiene and clothing management.   Rolling to Bilateral with Modified [place].   Supine to sit with Supervision.  Stand pivot transfers with Supervision.                PLAN:  Patient to be seen 6 x/week to address the above listed problems via neuromuscular re-education, cognitive retraining, self-care/home management, sensory integration, therapeutic activities, therapeutic exercises  Plan of Care expires: 05/01/17  Plan of Care reviewed with: patient    OT G-codes  Functional Assessment Tool Used: AMPAC  Score: 18  Functional Limitation: Self care  Self Care Current Status ():   Self Care Goal Status (): KRISHNA Diaz  04/01/2017

## 2017-04-01 NOTE — ASSESSMENT & PLAN NOTE
Likely cerebellum stroke  Limb ataxia  Etiology likely cardioembolic but need to have complete stroke work up     Antiplatelets: [id number] and plavix 75mgqd will need to be started on anticoagulation but may switch to one with easier therapeutic control   Statin: starting atorvastatin 40mg qhs for LDL> 70  DVT prophylaxis: enoxaparin 40mg qhs   Diagnostics pending: CTA head/neck  Disposition: pending OT, PT, ST

## 2017-04-01 NOTE — H&P
[facility]  Vascular Neurology  Comprehensive Stroke Center  History & Physical    Subjective:     History of Present Illness:  Patient is a 86 year old male with pmh of HTN, atrial fibrillation (on coumadin INR subtherapeutic),pacemaker who presented to the ED with unsteady gait.  Family noticed two hours ago 1300 that he was swaying to maintain his balance.  He normally has a shuffled gait that has progressed over the past month but this was a sudden change. He is still able to walk.  He normally ambulates without assistance and can take care of own affairs.  CT head negative for hemorrhage, INR 1.7 but patient not significantly disabled.  He denies headache, visual changes, numbness/tingling.           Past Medical History:   Diagnosis Date    *Atrial fibrillation     Allergy     Arthritis     Atrial fibrillation     Basal cell carcinoma 10/12/10    left earlobe    Basal cell carcinoma 7/23/2014    right neck    Cancer     Hx of Squamous Cell CA Scalp    Conductive hearing loss, bilateral     Chronic Eustachian Tube Dysfunction    Frequency of urination     Gastroenteritis     Headache(784.0)     History of colonoscopy     reportedly normal in 2004.    Hypertension     Kidney stones     Otitis media     SQUAMOUS CELL CARCINOMA 7/27/07    SCC ant scalp    Supraventricular tachycardia      Past Surgical History:   Procedure Laterality Date    ADENOIDECTOMY      Cardiac pacemaker implantation      COLONOSCOPY      Duputyren Contracture Right Hand      INSERT / REPLACE / REMOVE PACEMAKER      Resection of Skin Cancer Scalp      TONSILLECTOMY       Family History   Problem Relation Age of Onset    Melanoma Neg Hx     Psoriasis Neg Hx     Lupus Neg Hx     Eczema Neg Hx      Social History   Substance Use Topics    Smoking status: Never Smoker    Smokeless tobacco: Never Used      Comment: The patient is becoming more active.    Alcohol use 3.5 oz/week     7 Standard  drinks or equivalent per week     Review of patient's allergies indicates:   Allergen Reactions    Amlodipine      Intolerant secondary to dizziness    Dyazide [triamterene-hydrochlorothiazid]      Significant weakness with his diuretic.     Medications: I have reviewed the current medication administration record.    Prescriptions Prior to Admission   Medication Sig Dispense Refill Last Dose    antipyrine-benzocaine (AURALGAN OR EQUIV) 5.4-1.4 % Drop PLACE 3 DROPS INTO BOTH EARS 4 (FOUR) TIMES DAILY. 42 mL 4 Taking    guanfacine (TENEX) 1 MG Tab Take 1 tablet (1 mg total) by mouth every evening. 90 tablet 3 Taking    lisinopril (PRINIVIL,ZESTRIL) 20 MG tablet Take 1 tablet (20 mg total) by mouth 2 (two) times daily. 180 tablet 3 Taking    metoprolol succinate (TOPROL-XL) 50 MG 24 hr tablet TAKE 1 TABLET ONCE DAILY 90 tablet 0 Taking    warfarin (COUMADIN) 5 MG tablet Take 0.5-1 tablets (2.5-5 mg total) by mouth Daily. Take 1/2 to 1 tablet every evening as instructed by [facility] 80 tablet 3 Taking       Review of Systems   Constitutional: Negative for chills and fever.   HENT: Positive for hearing loss. Negative for drooling.    Eyes: Negative for photophobia and pain.   Respiratory: Negative for cough and shortness of breath.    Cardiovascular: Negative for leg swelling.   Gastrointestinal: Negative for constipation and diarrhea.   Endocrine: Positive for cold intolerance. Negative for heat intolerance.   Genitourinary: Positive for difficulty urinating.   Musculoskeletal: Positive for arthralgias and gait problem.   Skin: Negative for pallor and rash.   Neurological: Positive for dizziness. Negative for headaches.   Psychiatric/Behavioral: Negative for agitation and confusion.     Objective:     Vital Signs (Most Recent):  Temp: 97.9 °F (36.6 °C) (03/31/17 1449)  Pulse: 67 (03/31/17 1900)  Resp: 20 (03/31/17 1755)  BP: (!) 165/108 (03/31/17 1755)  SpO2: (!) 89 % (03/31/17 1755)    Vital Signs Range  (Last 24H):  Temp:  [97.9 °F (36.6 °C)]   Pulse:  [67-73]   Resp:  [16-20]   BP: (165-167)/()   SpO2:  [89 %-97 %]     Physical Exam   Constitutional: He appears well-developed and well-nourished.   HENT:   Head: Normocephalic and atraumatic.   Eyes: EOM are normal. Pupils are equal, round, and reactive to light.   Neck: Normal range of motion.   Cardiovascular: Normal rate and regular rhythm.    Pulmonary/Chest: Effort normal. No respiratory distress.   Abdominal: Soft. He exhibits no distension.   Musculoskeletal: Normal range of motion.   Neurological:   See below   Skin: Skin is warm and dry.   Psychiatric: He has a normal mood and affect.       Neurological Exam:   LOC: alert and follows requests  Language: No aphasia  Speech: No dysarthria  Orientation: Person, Place, Time  Memory: Recent memory intact, Remote memory intact, Age correct, Month correct  Visual Fields (CN II): Full  EOM (CN III, IV, VI): Full/intact  Oculocephalics: normal  Pupils (CN III, IV, VI): PERRL  Facial Sensation (CN V): Symmetric  Facial Movement (CN VII): symmetric facial expression  Hearing (CN VIII): + hearing loss  Gag Reflex (CN IX, X): not done  Shoulder/Neck (CN XI): SCM-Left: Normal ; SCM-Right: Normal ; Shoulder Shrug: Normal/Symetric  Tongue (CN XII): not examined  Reflexes: not examined  Motor*: Arm Left:  Normal (5/5), Leg Left:   Paretic:  4/5, Arm Right:   Normal (5/5), Leg Right:   Paretic:  4/5  Cerebellar*:abnormal finger to nose left side and heel to shin left side   Sensation: intact  Tone: Arm-Left: normal; Leg-Left: normal; Arm-Right: normal; Leg-Right: normal    Stroke Team Times:   Date and Time Taken: 3/31/2017 8:26 PM  Last Known Normal Date and Time : 3/31/416328:00  Symptom Onset Date and Time:3/31/843076:00  Stroke Team Called Date and Time: 3/31/865274:10  Stroke Team Arrived Date and Time: 3/31/782994:20  CT Interpretation Time: 15:33  Intervention decision time: no intervention.  NIH Stroke  Scale:  Interval: baseline (upon arrival/admit)  Level of Consciousness: 0 - alert  LOC Questions: 0 - answers both correctly  LOC Commands: 0 - performs both correctly  Best Gaze: 0 - normal  Visual: 0 - no visual loss  Facial Palsy: 0 - normal  Motor Left Arm: 0 - no drift  Motor Right Arm: 0 - no drift  Motor Left Le - no drift  Motor Right Le - no drift  Limb Ataxia: 2 - present in two limbs (Left sided )  Sensory: 0 - normal  Best Language: 0 - no aphasia  Dysarthria: 0 - normal articulation  Extinction and Inattention: 0 - no neglect  NIH Stroke Scale Total: 2  Modified [place] Scale:   Timeline: Prior to symptoms onset  Modified Mono Score: 1 - no significant disability        Laboratory:  CMP:   Recent Labs  Lab 17  1528   CALCIUM 9.0   ALBUMIN 3.3*   PROT 6.9      K 4.2   CO2 21*      BUN 13   CREATININE 1.0   ALKPHOS 164*   ALT 36   AST 43*   BILITOT 1.0     CBC:   Recent Labs  Lab 17  1528   WBC 7.19   RBC 4.40*   HGB 14.6   HCT 43.2      MCV 98   MCH 33.2*   MCHC 33.8     Lipid Panel:   Recent Labs  Lab 17  1528   CHOL 167   LDLCALC 99.4   HDL 50   TRIG 88     Hgb A1C: No results for input(s): HGBA1C in the last 168 hours.  TSH:   Recent Labs  Lab 17  1528   TSH 1.750       Diagnostic Results:  Brain Imaging:   CT head 17  -No acute intracranial abnormalities       Assessment/Plan:   Patient is a 86 year old male with pmh of HTN, atrial fibrillation (on coumadin INR subtherapeutic),pacemaker who presented to the ED with unsteady gait.  Family noticed two hours ago 1300 that he was swaying to maintain his balance.  Risk and benefits discussed with family of giving IV-tPA.  Decided not to give tPA because patient was able to still ambulate with minor instability.      * Embolic stroke involving left cerebellar artery  Likely cerebellum stroke  Limb ataxia  Etiology likely cardioembolic but need to have complete stroke work up     Antiplatelets:  [id number] and plavix 75mgqd will need to be started on anticoagulation but may switch to one with easier therapeutic control   Statin: starting atorvastatin 40mg qhs for LDL> 70  DVT prophylaxis: enoxaparin 40mg qhs   Diagnostics pending: CTA head/neck  Disposition: pending OT, PT, ST     Vertigo  2/2 stroke      Paroxysmal atrial fibrillation  Likely etiology of stroke   On coumadin INR 1.7      Pacemaker  Follow up with cardiology outpatient     Essential hypertension  Risk factor for stroke  Systolic under 200    Limb ataxia in two extremities  Result of stroke  PT, OT, ST ordered       Thrombolysis Candidate? None mild ataxia and gait disturbance.  Family says he is improving     Interventional Revascularization Candidate?  No large vessel occlusion suspected     Research Candidate?no    Kelsey Alexandra PA-C  Comprehensive Stroke Center  Department of Vascular Neurology   [facility]

## 2017-04-01 NOTE — PT/OT/SLP EVAL
Physical Therapy  Evaluation    Roby Rodriguez   MRN: [id number]   Admitting Diagnosis: Embolic stroke involving left cerebellar artery    PT Received On: 04/01/17  PT Start Time: 0928     PT Stop Time: 1000    PT Total Time (min): 32 min       Billable Minutes:  Evaluation 20 and Therapeutic Activity 12    Diagnosis: Embolic stroke involving left cerebellar artery    Past Medical History:   Diagnosis Date    *Atrial fibrillation     Allergy     Arthritis     Atrial fibrillation     Basal cell carcinoma 10/12/10    left earlobe    Basal cell carcinoma 7/23/2014    right neck    Cancer     Hx of Squamous Cell CA Scalp    Conductive hearing loss, bilateral     Chronic Eustachian Tube Dysfunction    Frequency of urination     Gastroenteritis     Headache(784.0)     History of colonoscopy     reportedly normal in 2004.    Hypertension     Kidney stones     Otitis media     SQUAMOUS CELL CARCINOMA 7/27/07    SCC ant scalp    Supraventricular tachycardia       Past Surgical History:   Procedure Laterality Date    ADENOIDECTOMY      Cardiac pacemaker implantation      COLONOSCOPY      Duputyren Contracture Right Hand      INSERT / REPLACE / REMOVE PACEMAKER      Resection of Skin Cancer Scalp      TONSILLECTOMY       Referring physician: Kelsey Alexandra PA-C  Date referred to PT: 3/31/17    General Precautions: Standard, fall, aspiration  Orthopedic Precautions: N/A   Braces: N/A       Patient History:  Pt states living in [place], [place] in a 1SH with 1 MICHAEL with his wife and daughter. Pt states being independent with all ADLs and functional mobility PTA with no DME.   Equipment used at home:  No Assistive Device.  Equipment owned but not using:  No Assistive Device.  Activity level: active.   Work: retired.   Drive: yes, SUV (only in neighborhood).   Cooking/Cleaning/Managing Home: wife manages home, daughter manages medicine..   Hand dominance: right.   Luda practiced: Jew.  Hobbies:  sitting outside on deck; watch tv.    Previous Level of Function:  Ambulation Skills: independent  Transfer Skills: independent  ADL Skills: independent    Subjective:  Communicated with CAIT Beth prior to session.  Pt agrees to participate in session.  Pt's daughter and wife present in session.   Chief Complaint: numbness of LLE and left hand--now resolved.  Patient goals: to return home.    Pain Ratin/10   Pain Rating Post-Intervention: 0/10    Objective:   Patient found with: telemetry     Cognitive Exam:  Oriented to: Person, Place, Time and Situation    Follows Commands/attention: Follows multistep  commands  Communication: clear/fluent  Safety awareness/insight to disability: intact    Physical Exam:  Postural examination/scapula alignment: Rounded shoulder, Head forward and Posterior pelvic tilt    Skin integrity: Visible skin intact (incision on left side of face, recent skin procedure)  Edema: None noted    Sensation:   Intact  light/touch BLE    Lower Extremity Range of Motion:  Right Lower Extremity: WFL  Left Lower Extremity: WFL    Lower Extremity Strength:  Right Lower Extremity: WFL  Left Lower Extremity: hip flexion 3/5, knee flexion 4/5, knee extension 3+/5, ankle 4/5     Fine motor coordination:  Intact  Left hand, finger to nose and Right hand, finger to nose    Functional Mobility:  Bed Mobility:    Rolling to the right: no use of bedrails: Modified [place]   Supine <> Sit: From right sidelying: Modified [place] (increased time)     Sitting Balance at Edge of Bed:   Assistance Level Required: [place]   Time: 10 minutes for interview   Postural deviations noted: Rounded shoulder and Head forward     Transfers:    Sit <> Stand: x2 trials from EOB with Contact Guard Assistance with No Assistive Device and Rolling Walker    Stand <> Sit: x 2 trials to EOB and bedside chair with Contact Guard Assistance with No Assistive Device and Rolling Walker   Comments: Pt with wide PIPPA, and  posterior lean; rounded shoulders. vc's for upright posture.     Gait:   Distance Ambulated: 65ft x 2 trials    Assistance level: Contact Guard Assistance and Minimal Assistance   Assistive Device used:  No Assistive Device and Rolling Walker   Gait Pattern: 3-point gait   Gait Deviation(s): decreased servando, decreased step length and decreased stride length   Impairments due to: decreased LLE strength and decreased balance.   Comments: 1st trial with no AD and HHA of L hand--min A; pt with shuffling gait pattern, 2 LOB requiring min A for correction. 2nd trial with RW, pt with CGA, no LOB and verbal cues for RW management and appropriate speed. Pt voiced increased safety with RW.    Therapeutic Activities and Exercises:  Education:  Patient provided with daily orientation and goals of this PT session. Patient and family agreed to participate in session.Patient and family aware of patient's deficits and therapy progression. They were educated to transfer with RN or PCT with use of RW; CGA of 1 person. Time provided for therapeutic counseling and discussion of health disposition. All questions answered to patient's and family's satisfaction, within scope of PT practice; voiced no other concerns. White board updated in patient's room, RN notified of session.    AM-PAC 6 CLICK MOBILITY  How much help from another person does this patient currently need?   1 = Unable, Total/Dependent Assistance  2 = A lot, Maximum/Moderate Assistance  3 = A little, Minimum/Contact Guard/Supervision  4 = None, Modified [place]/Independent    Turning over in bed (including adjusting bedclothes, sheets and blankets)?: 4  Sitting down on and standing up from a chair with arms (e.g., wheelchair, bedside commode, etc.): 3  Moving from lying on back to sitting on the side of the bed?: 3  Moving to and from a bed to a chair (including a wheelchair)?: 3  Need to walk in hospital room?: 3  Climbing 3-5 steps with a railing?: 3  Total Score:  19     AM-PAC Raw Score CMS G-Code Modifier Level of Impairment Assistance   6 % Total / Unable   7 - 9 CM 80 - 100% Maximal Assist   10 - 14 CL 60 - 80% Moderate Assist   15 - 19 CK 40 - 60% Moderate Assist   20 - 22 CJ 20 - 40% Minimal Assist   23 CI 1-20% SBA / CGA   24 CH 0% Independent/ Mod I     Patient left up in chair with all lines intact, call button in reach, RN notified and family present.    Assessment:   Roby Rodriguez is a 86 y.o. male with a medical diagnosis of Embolic stroke involving left cerebellar artery. Pt presents with decreased LLE strength and decreased balance. Mr. Rodriguez's deficits affect their ability to safely and independently participate in self-care tasks and functional mobility. Due to his physical therapy diagnosis of debility and deconditioning, they continue to benefit from acute PT services to address the following limitations: high fall risk, weakness, instability, the need for supervised instructions of exercise, and the decreased ability to perform functional activities which they were able to complete PTA. Education was provided to patient & family regarding importance of continued participation in therapy, patient's progress and discharge disposition. Pt would benefit from OPPT upon discharge to improve quality of life and focus on recovery of impairments.     Rehab identified problem list/impairments: Rehab identified problem list/impairments: weakness, impaired endurance, impaired self care skills, impaired functional mobilty, gait instability, impaired balance, decreased safety awareness    Rehab potential is good.    Activity tolerance: Good    Discharge recommendations: Discharge Facility/Level Of Care Needs: outpatient PT     Barriers to discharge: Barriers to Discharge: None    Equipment recommendations: Equipment Needed After Discharge: walker, rolling     GOALS:   Physical Therapy Goals        Problem: Physical Therapy Goal    Goal Priority Disciplines  Outcome Goal Variances Interventions   Physical Therapy Goal     PT/OT, PT Ongoing (interventions implemented as appropriate)     Description:  Goals to be met by: 4/7/17     Patient will increase functional independence with mobility by performing:    Sit to stand transfer with Modified [place] using Rolling Walker.  Bed to chair transfer via Stand Pivot with Modified [place] using Rolling Walker.  Gait  x 200 feet with Supervision using Rolling Walker.    Dynamic standing for 8 minutes with Stand-by Assistance using No Assistive Device and Rolling Walker.  Able to tolerate exercise for 15-20 reps with independence.  Patient and family able to teachback stroke education independently.                PLAN:    Patient to be seen 6 x/week to address the above listed problems via gait training, therapeutic activities, therapeutic exercises, neuromuscular re-education  Plan of Care expires: 05/01/17  Plan of Care reviewed with: patient, spouse, daughter    Personal factors/comorbidities: A-fib, HTN. Due to the listed comorbidities the patient cannot fully participate in PT POC.  Body systems elements affected: lower extremities, trunk, neuromuscular system  Clinical Presentation: stable  Functional Outcome Tools: ROM, MMT, AMPAC  Evaluation Complexity Level: Low    Erika Abreu PT, DPT  4/1/2017  [phone]

## 2017-04-01 NOTE — PLAN OF CARE
Pt arrived on unit around 1915.  Transferred to bed via stretcher safely.  Pt belongings with pt's wife and daughter.  Pt AAOx4.  VS stable.  GIDEON and NIH assessment completed on pt and stroke team notified of results.  Denies pain, numbness/tingling.  Voided in urinal.  Call light in reach, side rails up x2, nonskid socks on, bed alarm set, bed in lowest position with wheels locked.  Glasses on pt; bilateral hearing aids present.  Remains free from falls, skin breakdown, and injury.  Pt has cardiac pacemaker and is on telemetry monitoring.  Will complete other outcomes as they apply and are achieved; will continue to monitor.

## 2017-04-01 NOTE — PLAN OF CARE
Reviewed stroke nutrition therapy with pt and family. Pt and family verbalized understanding. Left MNT h/o with family.     Diet: cardiac  BMI: 24.86 (normal)     Teresita Gustafson RD

## 2017-04-01 NOTE — PROGRESS NOTES
[facility]  Vascular Neurology  Comprehensive Stroke Center  Progress Note      Neurologic Chief Complaint: gait instability    Subjective:     Interval History: Patient is seen for follow-up neurological assessment and treatment recommendations: JOCY. CTA engative for LVO, does show R sided mastoiditis.     HPI, Past Medical, Family, and Social History remains the same as documented in the initial encounter.     Review of Systems   Constitutional: Negative for chills and fever.   HENT: Positive for hearing loss. Negative for drooling.    Eyes: Negative for photophobia, pain and visual disturbance.   Respiratory: Negative for cough and shortness of breath.    Cardiovascular: Negative for leg swelling.   Gastrointestinal: Negative for constipation and diarrhea.   Endocrine: Positive for cold intolerance. Negative for heat intolerance.   Musculoskeletal: Positive for arthralgias and gait problem.   Skin: Negative for pallor and rash.   Neurological: Positive for dizziness and facial asymmetry (present for years). Negative for syncope and headaches.   Psychiatric/Behavioral: Negative for agitation and confusion.     Scheduled Meds:   aspirin  81 mg Oral Daily    atorvastatin  40 mg Oral Daily    clopidogrel  75 mg Oral Daily    enoxaparin  40 mg Subcutaneous Daily    guanfacine  1 mg Oral Daily    metoprolol succinate  50 mg Oral Daily    sodium chloride 0.9%  3 mL Intravenous Q8H    warfarin  5 mg Oral Daily     Continuous Infusions:   sodium chloride 0.9%       PRN Meds:hydrALAZINE, sodium chloride 0.9%    Objective:     Vital Signs (Most Recent):  Temp: 97.9 °F (36.6 °C) (04/01/17 0858)  Pulse: 63 (04/01/17 0858)  Resp: 18 (04/01/17 0858)  BP: (!) 177/85 (04/01/17 0858)  SpO2: 95 % (04/01/17 0858)  BP Location: Left arm    Vital Signs Range (Last 24H):  Temp:  [97.9 °F (36.6 °C)-98.5 °F (36.9 °C)]   Pulse:  [58-73]   Resp:  [16-20]   BP: (140-177)/()   SpO2:  [89 %-97 %]   BP  Location: Left arm    Physical Exam   Constitutional: He is oriented to person, place, and time. He appears well-developed and well-nourished.   HENT:   Head: Normocephalic.   Bandage in place over left face C/D/I s/p skin cancer removal   Eyes: EOM are normal. Pupils are equal, round, and reactive to light.   Neck: Normal range of motion.   Cardiovascular: Normal rate.    Pulmonary/Chest: Effort normal. No respiratory distress. He has no wheezes.   Abdominal: Soft. Bowel sounds are normal. He exhibits no distension.   Musculoskeletal: Normal range of motion.   Neurological: He is alert and oriented to person, place, and time.   See below   Skin: Skin is warm and dry.   Psychiatric: He has a normal mood and affect.   Vitals reviewed.      Neurological Exam:   LOC: alert and follows requests  Language: No aphasia  Speech: No dysarthria  Orientation: Person, Place, Time  Visual Fields (CN II): Full  EOM (CN III, IV, VI): Full/intact  Facial Sensation (CN V): Symmetric  Facial Movement (CN VII): left upper and lower facial assymmetry (chronic)  Motor*: Arm Left:  Normal (5/5), Leg Left:   Normal (5/5), Arm Right:   Normal (5/5), Leg Right:   Normal (5/5)  Cerebellar*: Finger/Nose Abnormal - left upper, Heel/Shin Abnormal - left lower  Sensation: intact to light touch equally and bilaterally  Tone: Arm-Left: normal; Leg-Left: normal; Arm-Right: normal; Leg-Right: normal    Stroke Team Times:   Date and Time Taken: 3/31/2017 8:26 PM  Last Known Normal Date and Time : 3/31/380247:00  Symptom Onset Date and Time:3/31/558131:00  Stroke Team Called Date and Time: 3/31/212430:10  Stroke Team Arrived Date and Time: 3/31/429265:20  CT Interpretation Time: 15:33  Intervention decision time: no intervention.  NIH Stroke Scale:  Interval: baseline (upon arrival/admit)  Level of Consciousness: 0 - alert  LOC Questions: 0 - answers both correctly  LOC Commands: 0 - performs both correctly  Best Gaze: 0 - normal  Visual: 0 - no  visual loss  Facial Palsy: 2 - partial  Motor Left Arm: 1 - drift  Motor Right Arm: 0 - no drift  Motor Left Le - no drift  Motor Right Le - no drift  Limb Ataxia: 2 - present in two limbs (Left sided )  Sensory: 0 - normal  Best Language: 0 - no aphasia  Dysarthria: 0 - normal articulation  Extinction and Inattention: 0 - no neglect  NIH Stroke Scale Total: 5      Laboratory:  CMP:   Recent Labs  Lab 17  0502   CALCIUM 8.6*   ALBUMIN 2.9*   PROT 6.0      K 4.0   CO2 27      BUN 11   CREATININE 0.9   ALKPHOS 143*   ALT 32   AST 35   BILITOT 1.1*     CBC:   Recent Labs  Lab 17  0502   WBC 6.12   RBC 4.10*   HGB 13.7*   HCT 39.0*   *   MCV 95   MCH 33.4*   MCHC 35.1     Lipid Panel:   Recent Labs  Lab 17  1528   CHOL 167   LDLCALC 99.4   HDL 50   TRIG 88     Coagulation:   Recent Labs  Lab 17  0502   INR 1.6*   APTT 36.4*     Hgb A1C: No results for input(s): HGBA1C in the last 168 hours.    Diagnostic Results:  CT head 3/31: No acute abnormality. Findings of chronic microvascular ischemic disease. Mild sinus disease and opacification of the right mastoid air cells and middle ear.    CTA head and neck 3/31:Approximately 50% luminal narrowing involving the posterior division of the right proximal M2 segment of the middle cerebral artery.  No evidence of occlusion or aneurysm. Age-appropriate senescent changes, and chronic vascular ischemic change. Right sided mastoiditis.    Assessment/Plan:     Risk and benefits discussed with family of giving IV-tPA.  Decided not to give tPA because patient was able to still ambulate with minor instability. CT head showed no acute infarction. CTA was negative for LVO, showed 50% narrowing of posterior division of R proximal m2 segment of MCA. Patient was started on antiplatelets, statin and coumadin for secondary stroke prevention    : start coumadin; ENT consult for mastoiditis; likely discharge in AM with outpatient PT/OT           * Embolic stroke involving left cerebellar artery  Likely cerebellum stroke  Limb ataxia  Etiology likely cardioembolic but need to have complete stroke work up     Antiplatelets: ASA 325mg qd and plavix 75mg qd, coumadin  Statin: starting atorvastatin 40mg qhs for LDL> 70  DVT prophylaxis: enoxaparin 40mg qhs   Diagnostics pending: N/A  Disposition: outpatient PT/OT; likely discharge in the morning    BPH (benign prostatic hypertrophy)  - trial of flomax as patient reported difficulty urinating on admission    Vertigo  -2/2 stroke  -Could have component of ear infection/ dysfunction as imaging showed R mastoiditis, opacification of R mastoid air cells and middle ear  -ENT consulted      Paroxysmal atrial fibrillation  -Likely etiology of stroke as INR 1.7 on admit   -Restart coumadin today   -cont toprol 50 mg daily    Pacemaker  Follow up with cardiology outpatient     Essential hypertension  - Risk factor for stroke  - SBP< 180  - home meds: toprol 50 mg daily and lisinopril 20 mg BID  - cont toprol 50 mg daily, restart lisinopril 20 mg daily, titrate up prn      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

## 2017-04-01 NOTE — PLAN OF CARE
Problem: Physical Therapy Goal  Goal: Physical Therapy Goal  Goals to be met by: 4/7/17     Patient will increase functional independence with mobility by performing:    Sit to stand transfer with Modified [place] using Rolling Walker.  Bed to chair transfer via Stand Pivot with Modified [place] using Rolling Walker.  Gait x 200 feet with Supervision using Rolling Walker.   Dynamic standing for 8 minutes with Stand-by Assistance using No Assistive Device and Rolling Walker.  Able to tolerate exercise for 15-20 reps with independence.  Patient and family able to teachback stroke education independently.  Outcome: Ongoing (interventions implemented as appropriate)    Goals initiated at [facility]. Pt would benefit from OPPT upon discharge; DME required: RW.  Appropriate to transfer with: RN/PCT, CGA of 1 person  Erika Abreu PT, DPT  4/1/2017  Pager: [phone]

## 2017-04-01 NOTE — PLAN OF CARE
POC reviewed with pt;  Able to verbalize acceptance and understanding.  VS stable; AAOx4.  Remains free from falls, skin breakdown, and injury.  Telemetry monitoring ongoing; pt has a pacemaker.  Denies pain.  Able to ambulate to bathroom with 1 assist.  Urinal at bedside, call light in reach, side rails up x2, nonskid socks on, bed in lowest position with wheels locked, bed alarm set.  Glasses and hearing aids available at bedside.  Bandage on face from removal of a cancerous spot; daughter changes the dsg each day and will change today she says.  Pt does not eat meat on Fridays during Lint.  Stroke education packet given to patient.  Questions answered/encouraged; reassurance provided.  Will continue to monitor.

## 2017-04-02 VITALS
HEIGHT: 73 IN | RESPIRATION RATE: 18 BRPM | BODY MASS INDEX: 24.92 KG/M2 | TEMPERATURE: 97 F | WEIGHT: 188 LBS | HEART RATE: 61 BPM | DIASTOLIC BLOOD PRESSURE: 82 MMHG | OXYGEN SATURATION: 99 % | SYSTOLIC BLOOD PRESSURE: 142 MMHG

## 2017-04-02 LAB
ALBUMIN SERPL BCP-MCNC: 3.1 G/DL
ALP SERPL-CCNC: 142 U/L
ALT SERPL W/O P-5'-P-CCNC: 28 U/L
ANION GAP SERPL CALC-SCNC: 9 MMOL/L
AST SERPL-CCNC: 32 U/L
BASOPHILS # BLD AUTO: 0.03 K/UL
BASOPHILS NFR BLD: 0.4 %
BILIRUB SERPL-MCNC: 1 MG/DL
BUN SERPL-MCNC: 11 MG/DL
CALCIUM SERPL-MCNC: 8.7 MG/DL
CHLORIDE SERPL-SCNC: 104 MMOL/L
CO2 SERPL-SCNC: 24 MMOL/L
CREAT SERPL-MCNC: 0.9 MG/DL
DIFFERENTIAL METHOD: ABNORMAL
EOSINOPHIL # BLD AUTO: 0.3 K/UL
EOSINOPHIL NFR BLD: 4.6 %
ERYTHROCYTE [DISTWIDTH] IN BLOOD BY AUTOMATED COUNT: 13.6 %
EST. GFR  (AFRICAN AMERICAN): >60 ML/MIN/1.73 M^2
EST. GFR  (NON AFRICAN AMERICAN): >60 ML/MIN/1.73 M^2
GLUCOSE SERPL-MCNC: 87 MG/DL
HCT VFR BLD AUTO: 42.4 %
HGB BLD-MCNC: 14.2 G/DL
INR PPP: 1.6
LYMPHOCYTES # BLD AUTO: 2.6 K/UL
LYMPHOCYTES NFR BLD: 38.5 %
MCH RBC QN AUTO: 32.7 PG
MCHC RBC AUTO-ENTMCNC: 33.5 %
MCV RBC AUTO: 98 FL
MONOCYTES # BLD AUTO: 0.6 K/UL
MONOCYTES NFR BLD: 8.4 %
NEUTROPHILS # BLD AUTO: 3.2 K/UL
NEUTROPHILS NFR BLD: 48 %
PLATELET # BLD AUTO: 161 K/UL
PMV BLD AUTO: 10.6 FL
POTASSIUM SERPL-SCNC: 3.6 MMOL/L
PROT SERPL-MCNC: 6.4 G/DL
PROTHROMBIN TIME: 16.1 SEC
RBC # BLD AUTO: 4.34 M/UL
SODIUM SERPL-SCNC: 137 MMOL/L
WBC # BLD AUTO: 6.67 K/UL

## 2017-04-02 PROCEDURE — 85610 PROTHROMBIN TIME: CPT

## 2017-04-02 PROCEDURE — 36415 COLL VENOUS BLD VENIPUNCTURE: CPT

## 2017-04-02 PROCEDURE — 25000003 PHARM REV CODE 250: Performed by: INTERNAL MEDICINE

## 2017-04-02 PROCEDURE — 99233 SBSQ HOSP IP/OBS HIGH 50: CPT | Mod: GC,,, | Performed by: PSYCHIATRY & NEUROLOGY

## 2017-04-02 PROCEDURE — 85025 COMPLETE CBC W/AUTO DIFF WBC: CPT

## 2017-04-02 PROCEDURE — 25000003 PHARM REV CODE 250: Performed by: PHYSICIAN ASSISTANT

## 2017-04-02 PROCEDURE — 80053 COMPREHEN METABOLIC PANEL: CPT

## 2017-04-02 RX ORDER — ATORVASTATIN CALCIUM 40 MG/1
40 TABLET, FILM COATED ORAL DAILY
Qty: 30 TABLET | Refills: 0 | Status: SHIPPED | OUTPATIENT
Start: 2017-04-02 | End: 2017-05-02 | Stop reason: SDUPTHER

## 2017-04-02 RX ORDER — NAPROXEN SODIUM 220 MG/1
81 TABLET, FILM COATED ORAL DAILY
Refills: 0 | COMMUNITY
Start: 2017-04-02 | End: 2017-04-12

## 2017-04-02 RX ADMIN — Medication 3 ML: at 06:04

## 2017-04-02 RX ADMIN — ASPIRIN 81 MG CHEWABLE TABLET 81 MG: 81 TABLET CHEWABLE at 08:04

## 2017-04-02 RX ADMIN — METOPROLOL SUCCINATE 50 MG: 50 TABLET, EXTENDED RELEASE ORAL at 08:04

## 2017-04-02 RX ADMIN — LISINOPRIL 20 MG: 20 TABLET ORAL at 08:04

## 2017-04-02 RX ADMIN — TAMSULOSIN HYDROCHLORIDE 0.4 MG: 0.4 CAPSULE ORAL at 08:04

## 2017-04-02 RX ADMIN — ATORVASTATIN CALCIUM 40 MG: 20 TABLET, FILM COATED ORAL at 08:04

## 2017-04-02 RX ADMIN — GUANFACINE HYDROCHLORIDE 1 MG: 1 TABLET ORAL at 08:04

## 2017-04-02 RX ADMIN — CLOPIDOGREL 75 MG: 75 TABLET, FILM COATED ORAL at 08:04

## 2017-04-02 NOTE — PLAN OF CARE
Problem: Fall Risk (Adult)  Goal: Identify Related Risk Factors and Signs and Symptoms  Related risk factors and signs and symptoms are identified upon initiation of Human Response Clinical Practice Guideline (CPG)   Outcome: Ongoing (interventions implemented as appropriate)  Pt will be free of falls during hospital stay.

## 2017-04-02 NOTE — ASSESSMENT & PLAN NOTE
Likely cerebellum stroke  Limb ataxia  Etiology likely cardioembolic but need to have complete stroke work up     Antiplatelets: ASA 325mg qd   A/C: coumadin  Statin: starting atorvastatin 40mg qhs for LDL> 70  DVT prophylaxis: enoxaparin 40mg qhs   Diagnostics pending: N/A  Disposition: outpatient PT/OT; likely discharge in the morning

## 2017-04-02 NOTE — SUBJECTIVE & OBJECTIVE
Stroke Team Times:   Date and Time Taken: 3/31/2017 8:26 PM  Last Known Normal Date and Time : 3/31/551663:00  Symptom Onset Date and Time:3/31/430055:00  Stroke Team Called Date and Time: 3/31/658767:10  Stroke Team Arrived Date and Time: 3/31/129336:20  CT Interpretation Time: 15:33  Intervention decision time: no intervention.  NIH Stroke Scale:  Interval: baseline (upon arrival/admit)  Level of Consciousness: 0 - alert  LOC Questions: 0 - answers both correctly  LOC Commands: 0 - performs both correctly  Best Gaze: 0 - normal  Visual: 0 - no visual loss  Facial Palsy: 2 - partial  Motor Left Arm: 1 - drift  Motor Right Arm: 0 - no drift  Motor Left Le - no drift  Motor Right Le - no drift  Limb Ataxia: 2 - present in two limbs (Left sided )  Sensory: 0 - normal  Best Language: 0 - no aphasia  Dysarthria: 0 - normal articulation  Extinction and Inattention: 0 - no neglect  NIH Stroke Scale Total: 5

## 2017-04-02 NOTE — DISCHARGE INSTRUCTIONS
Have coumadin level drawn within 3 days of discharge. Discuss modifying your coumadin dose with Dr. Rocha based on the level.

## 2017-04-02 NOTE — PLAN OF CARE
Patient is alert and oriented time 4.  No acute neuro changes noticed throughout the night.  VSS.  Resting comfortably in bed.  Will continue to monitor.

## 2017-04-02 NOTE — PLAN OF CARE
Problem: Fall Risk (Adult)  Goal: Absence of Falls  Patient will demonstrate the desired outcomes by discharge/transition of care.   Outcome: Ongoing (interventions implemented as appropriate)  Plan of care reviewed with patient.  Verbalized understanding.  Patient is alert and oriented time 4.  No acute neuro changes noticed throughout the night.  VSS.  Resting comfortably in bed.  Will continue to monitor.

## 2017-04-03 NOTE — PLAN OF CARE
Problem: Occupational Therapy Goal  Goal: Occupational Therapy Goal  Goals to be met by: 4/8/17     Patient will increase functional independence with ADLs by performing:    UE Dressing with Supervision. - not met  LE Dressing with Supervision. - not met  Grooming while standing with Supervision. - not met  Toileting from toilet with Supervision for hygiene and clothing management. - not met  Rolling to Bilateral with Modified [place]. - not met  Supine to sit with Supervision. - not met  Stand pivot transfers with Supervision. - not met   Outcome: Outcome(s) achieved Date Met:  04/03/17  No goals met.  Discharged from hospital.

## 2017-04-04 ENCOUNTER — TELEPHONE (OUTPATIENT)
Dept: NEUROSURGERY | Facility: HOSPITAL | Age: 82
End: 2017-04-04

## 2017-04-04 ENCOUNTER — TELEPHONE (OUTPATIENT)
Dept: NEUROLOGY | Facility: CLINIC | Age: 82
End: 2017-04-04

## 2017-04-04 ENCOUNTER — OFFICE VISIT (OUTPATIENT)
Dept: DERMATOLOGY | Facility: CLINIC | Age: 82
End: 2017-04-04
Payer: MEDICARE

## 2017-04-04 ENCOUNTER — ANTI-COAG VISIT (OUTPATIENT)
Dept: CARDIOLOGY | Facility: CLINIC | Age: 82
End: 2017-04-04
Payer: MEDICARE

## 2017-04-04 DIAGNOSIS — Z48.02 VISIT FOR SUTURE REMOVAL: Primary | ICD-10-CM

## 2017-04-04 DIAGNOSIS — Z79.01 LONG TERM (CURRENT) USE OF ANTICOAGULANTS: Primary | ICD-10-CM

## 2017-04-04 DIAGNOSIS — I48.91 ATRIAL FIBRILLATION, UNSPECIFIED TYPE: ICD-10-CM

## 2017-04-04 LAB — INR PPP: 2 (ref 2–3)

## 2017-04-04 PROCEDURE — 99024 POSTOP FOLLOW-UP VISIT: CPT | Mod: ,,, | Performed by: DERMATOLOGY

## 2017-04-04 PROCEDURE — 85610 PROTHROMBIN TIME: CPT | Mod: PBBFAC,PO

## 2017-04-04 PROCEDURE — 99999 PR PBB SHADOW E&M-EST. PATIENT-LVL II: CPT | Mod: PBBFAC,,, | Performed by: DERMATOLOGY

## 2017-04-04 PROCEDURE — 99211 OFF/OP EST MAY X REQ PHY/QHP: CPT | Mod: PBBFAC,27,PO

## 2017-04-04 PROCEDURE — 99999 PR PBB SHADOW E&M-EST. PATIENT-LVL I: CPT | Mod: PBBFAC,,,

## 2017-04-04 NOTE — PROGRESS NOTES
CC: 86 y.o.male patient is here for suture removal.     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  Patient reports no problems.    WOUND PE:  Sutures intact.  Wound healing well.  Good approximation of skin edges. No undue erythema to surrounding skin or signs or symptoms of infection.    IMPRESSION:  Healing well post Mohs' micrographic surgery and repair    PLAN:  Site cleaned with peroxide, sutures removed  Dressed with Aquaphor ointment   Reviewed further care  Followup 6 weeks; call prn sooner

## 2017-04-04 NOTE — TELEPHONE ENCOUNTER
Called number on file.  Spoke with patient's wife Clarita Rodriguez who along with daughter is patient's primary caregivers.  Risk factors specific to patient for stroke discussed with teach back implemented.  Patient's wife verbalized understanding of discharge instructions and medications.  Patient's wife was asked about discharge appointments and follow up care.  No follow appointment scheduled thus far.  Message sent to Neuro clinic on patient's behalf to schedule follow up appointment. Warning sings discussed with teach back discussion method implemented.  Notified to seek immediate medical help via 911 if new or worsening stroke symptoms occur.  Patient and wife relayed no new questions or comments at this time.  Instructed to call [facility] with any further questions.

## 2017-04-04 NOTE — PROGRESS NOTES
Patient was in the ER, had a minor TIA. INR was low. He was started on atorvastatin. Will increase dose and recheck INR next week.

## 2017-04-04 NOTE — TELEPHONE ENCOUNTER
----- Message from Bridgetjun Guillen sent at 4/3/2017 10:15 AM CDT -----  Contact: pt's daughter [phone]  Pt's daughter called requesting to schedule her father's 1 month follow up apt to see Dr Michelle who just saw him in the hospital.    Please call pt to arrange this.  The next available that came up was in July.  Pt can be reached at [phone]    Thanks  patience

## 2017-04-04 NOTE — MR AVS SNAPSHOT
[facility]  [address] [facility] [address]  [facility] [address]  Phone: [phone]                  Roby Rodriguez   2017 2:00 PM   Anti-coag visit    Description:  Male : 1930   Provider:  Jade Meredith, Del   Department:  [facility]           Diagnoses this Visit        Comments    Long term (current) use of anticoagulants    -  Primary     Atrial fibrillation, unspecified type                To Do List           Future Appointments        Provider Department Dept Phone    4/10/2017 11:00 AM MARIA TERESA [facility] - Outpatient Rehab [phone]    2017 10:10 AM LAB, APPOINTMENT [place] [facility] [phone]    2017 10:40 AM Vincenzo Velazquez MD [facility] Arrhythmia [phone]    2017 1:00 PM Desmond Mcintyre MD [facility] Dermatology [phone]    2017 8:00 AM HOME MONITOR DEVICE CHECK, [facility] Arrhythmia [phone]      Goals (5 Years of Data)     None      [facility]     [facility] -  Assistance  Unless otherwise directed by your provider, please contact [facility], our nurse care line that is available for  assistance.     Registered nurses in the [facility] provide: appointment scheduling, clinical advisement, health education, and other advisory services.  Call: [phone] (toll free)               Medications           Message regarding Medications     Verify the changes and/or additions to your medication regime listed below are the same as discussed with your clinician today.  If any of these changes or additions are incorrect, please notify your healthcare provider.             Verify that the below list of medications is an accurate representation of the medications you are currently taking.  If none reported, the list may be blank. If incorrect, please contact your healthcare provider. Carry this list with you in case of emergency.           Current  Medications     antipyrine-benzocaine (AURALGAN OR EQUIV) 5.4-1.4 % Drop PLACE 3 DROPS INTO BOTH EARS 4 (FOUR) TIMES DAILY.    aspirin 81 MG Chew Take 1 tablet (81 mg total) by mouth once daily.    atorvastatin (LIPITOR) 40 MG tablet Take 1 tablet (40 mg total) by mouth once daily.    guanfacine (TENEX) 1 MG Tab Take 1 tablet (1 mg total) by mouth every evening.    lisinopril (PRINIVIL,ZESTRIL) 20 MG tablet Take 1 tablet (20 mg total) by mouth 2 (two) times daily.    metoprolol succinate (TOPROL-XL) 50 MG 24 hr tablet TAKE 1 TABLET ONCE DAILY    warfarin (COUMADIN) 5 MG tablet Take 0.5-1 tablets (2.5-5 mg total) by mouth Daily. Take 1/2 to 1 tablet every evening as instructed by Coumadin Clinic           Clinical Reference Information           Allergies as of 4/4/2017     Amlodipine    Dyazide [Triamterene-hydrochlorothiazid]      Immunizations Administered on Date of Encounter - 4/4/2017     None      Orders Placed During Today's Visit      Normal Orders This Visit    POCT INR     Future Labs/Procedures Expected by Expires    Protime-INR  4/4/2017 6/3/2018         4/4/2017  2:00 PM - Qamar CarrilloD      Component Results     Component Value Flag Ref Range Units Status    INR 2.0  2.0 - 3.0  Final      March 2017 Details    Sun Mon Tue Wed Thu Fri Sat        1               2               3               4                 5      2.5 mg         6      2.5 mg         7      5 mg         8      2.5 mg         9      2.5 mg         10      2.5 mg         11      5 mg           12      2.5 mg         13      2.5 mg         14      5 mg         15      2.5 mg         16      2.5 mg         17      2.5 mg         18      5 mg           19      2.5 mg         20      2.5 mg         21      5 mg         22      Missed         23      2.5 mg         24      2.5 mg         25      5 mg           26      2.5 mg         27   1.8   2.5 mg   See details      28      5 mg         29      2.5 mg         30      2.5 mg          31   1.7   2.5 mg   See details        Date Details   03/27 Last INR check   INR: 1.8      03/31 INR: 1.7   Coumadin Therapy: 2.0 - 3.0 for INR for all indicators except mechanical heart valves and antiphospholipid syndromes which should use 2.5 - 3.5.                  April 2017 Details    Sun Mon Tue Wed Thu Fri Sat           1   1.6   5 mg   See details        2   1.6   2.5 mg   See details      3      2.5 mg         4   2.0   5 mg   See details      5      2.5 mg         6      5 mg         7      2.5 mg         8      5 mg           9      2.5 mg         10      2.5 mg         11      5 mg         12      2.5 mg         13      5 mg         14      2.5 mg         15      5 mg           16      2.5 mg         17      2.5 mg         18      5 mg         19      2.5 mg         20      5 mg         21      2.5 mg         22      5 mg           23      2.5 mg         24      2.5 mg         25      5 mg         26      2.5 mg         27      5 mg         28      2.5 mg         29      5 mg           30      2.5 mg                Date Details   04/01 INR: 1.6   Coumadin Therapy: 2.0 - 3.0 for INR for all indicators except mechanical heart valves and antiphospholipid syndromes which should use 2.5 - 3.5.       04/02 INR: 1.6   Coumadin Therapy: 2.0 - 3.0 for INR for all indicators except mechanical heart valves and antiphospholipid syndromes which should use 2.5 - 3.5.       04/04 This INR check   INR: 2.0      Date of next INR: No date specified               How to take your warfarin dose     To take:  2.5 mg Take 0.5 of a 5 mg tablet.    To take:  5 mg Take 1 of the 5 mg tablets.           Anticoagulation Summary as of 4/4/2017     Maintenance plan 5 mg (5 mg x 1) on Tue, Thu, Sat; 2.5 mg (5 mg x 0.5) all other days    Full instructions 5 mg on Tue, Thu, Sat; 2.5 mg all other days    Next INR check       Anticoagulation Episode Summary     Comments [facility] vitk 2x/wk.   2 ETOH/wk daughter-Erika      Language  Assistance Services     ATTENTION: Language assistance services are available, free of charge. Please call [phone].      ATENCIÓN: Si habla johan, tiene a martinez disposición servicios gratuitos de asistencia lingüística. Llame al [phone].     CHÚ Ý: N?u b?n nói Ti?ng Vi?t, có các d?ch v? h? tr? ngôn ng? mi?n phí dành cho b?n. G?i s? [phone].         [facility] complies with applicable Federal civil rights laws and does not discriminate on the basis of race, color, national origin, age, disability, or sex.

## 2017-04-10 ENCOUNTER — CLINICAL SUPPORT (OUTPATIENT)
Dept: REHABILITATION | Facility: HOSPITAL | Age: 82
End: 2017-04-10
Attending: INTERNAL MEDICINE
Payer: MEDICARE

## 2017-04-10 DIAGNOSIS — R29.898 LEFT LEG WEAKNESS: ICD-10-CM

## 2017-04-10 DIAGNOSIS — I63.442 EMBOLIC STROKE INVOLVING LEFT CEREBELLAR ARTERY: Primary | ICD-10-CM

## 2017-04-10 DIAGNOSIS — I47.10 SVT (SUPRAVENTRICULAR TACHYCARDIA): Primary | ICD-10-CM

## 2017-04-10 PROCEDURE — G8978 MOBILITY CURRENT STATUS: HCPCS | Mod: CK,PN

## 2017-04-10 PROCEDURE — G8979 MOBILITY GOAL STATUS: HCPCS | Mod: CJ,PN

## 2017-04-10 PROCEDURE — 97116 GAIT TRAINING THERAPY: CPT | Mod: PN

## 2017-04-10 PROCEDURE — 97161 PT EVAL LOW COMPLEX 20 MIN: CPT | Mod: PN

## 2017-04-10 NOTE — PROGRESS NOTES
OUTPATIENT PHYSICAL THERAPY  PHYSICAL THERAPY EVALUATION    Name: Roby Rodriguez  Clinic Number: [id number]    Evaluation Date: 04/10/2017  Visit #: 1/20   Precautions: fall risk, cardiac     Diagnosis:   Encounter Diagnoses   Name Primary?    Embolic stroke involving left cerebellar artery Yes    Left leg weakness      Physician: Nelida Eduardo MD  Treatment Orders: PT Eval and Treat  Past Medical History:   Diagnosis Date    *Atrial fibrillation     Allergy     Arthritis     Atrial fibrillation     Basal cell carcinoma 10/12/10    left earlobe    Basal cell carcinoma 7/23/2014    right neck    Cancer     Hx of Squamous Cell CA Scalp    Conductive hearing loss, bilateral     Chronic Eustachian Tube Dysfunction    Frequency of urination     Gastroenteritis     Headache     History of colonoscopy     reportedly normal in 2004.    Hypertension     Kidney stones     Otitis media     SQUAMOUS CELL CARCINOMA 7/27/07    SCC ant scalp    Supraventricular tachycardia      Current Outpatient Prescriptions   Medication Sig    antipyrine-benzocaine (AURALGAN OR EQUIV) 5.4-1.4 % Drop PLACE 3 DROPS INTO BOTH EARS 4 (FOUR) TIMES DAILY.    aspirin 81 MG Chew Take 1 tablet (81 mg total) by mouth once daily.    atorvastatin (LIPITOR) 40 MG tablet Take 1 tablet (40 mg total) by mouth once daily.    guanfacine (TENEX) 1 MG Tab Take 1 tablet (1 mg total) by mouth every evening.    lisinopril (PRINIVIL,ZESTRIL) 20 MG tablet Take 1 tablet (20 mg total) by mouth 2 (two) times daily.    metoprolol succinate (TOPROL-XL) 50 MG 24 hr tablet TAKE 1 TABLET ONCE DAILY    warfarin (COUMADIN) 5 MG tablet Take 0.5-1 tablets (2.5-5 mg total) by mouth Daily. Take 1/2 to 1 tablet every evening as instructed by [facility]     No current facility-administered medications for this visit.      Review of patient's allergies indicates:   Allergen Reactions    Amlodipine      Intolerant secondary to dizziness    Dyazide  [triamterene-hydrochlorothiazid]      Significant weakness with his diuretic.       History   Prior Therapy/PMH: none   Social History:  lives with spouse, single story home, one step into the home   Previous functional status: independent with all mobility, shuffling  with walk    Current functional status: SBA with all gait and mobility  Work: retired     Subjective   History of Present Illness: Recent CVA with L sided weakness and decline in stability/independence with gait, mobility and standing balance   Chief complaint: decreased stability with gait and standing balance   Pain: current 0/10,    Pts goals: improve stability and independence with walking and standing    Objective   Mental status: alert  Posture Alignment: Fair; fwd head, rounded shoulders, decreased lumbar lordosis     GAIT: Roby ambulates with no assistive device with supervision.     GAIT DEVIATIONS: Roby displays decreased servando and moderate path deviations to the Left, intermittent scissoring of Left LE + trendelenburg on L side     * trial of gait with SPC on R side, pt demonstrated improved stability with gait       Functional Gait Assessment:   1. Gait on level surface =  2  2. Change in Gait Speed = 2  3. Gait with horizontal head turns  = 1  4. Gait with vertical head turns = 1  5. Gait with pivot turns = 1  6. Step over obstacle = 1  7. Gait with Narrow PIPPA = 0  8. Gait with eyes closed = 1  9. Ambulating Backwards = 2   10. Steps = 1    Score 12 /30     Strength: limited as follows   Left Hip flexion 4/5  Left knee ext 4/5   Bilateral Hamstring 4/5   Bilateral Hip ER 4/5   Bilateral Hip Abduction < 4/5     Flexibility:   90/90 Hamstring length: R= -30/L= -30   + Favio test Phil   + Ely test Phil     Special Tests:  Gait speed: .75 m/s  TUG = 11 sec     Balance/Coordination:   Rhomberg stance = unable to attain position without LOB   LE point to = impaired on L side   Rapid movements  L side = diminished     Pt/family  was provided educational information, including: role of PT, goals for PT, scheduling - pt verbalized understanding. Discussed insurance plan with pt.     TREATMENT     Discussed Plan of Care with patient: Yes    Roby received 5 minutes of therapeutic exercise including:   Seated Hamstring stretches with towel roll x 10 breath cycles     Roby received 10 minutes of gait tx including:  Gait training with SPC on R side for improved stability, cues for improved posture and upright gaze     Written Home Exercises Provided: yes  Exercises were reviewed and Roby was able to demonstrate them prior to the end of the session. Pt received a written copy of exercises to perform at home. Roby demonstrated good  understanding of the education provided.     Assessment    Roby is a 86 y.o. male referred to outpatient physical therapy with a medical diagnosis of Left sided weakness with gait and balance deficits due to recent CVA . Demonstrates limitations as described in the problem list. Pt prognosis is Good. Positive prognostic factors include Motivated, family support. Negative prognostic factors include age, postural changes . Pt will benefit from skilled outpatient physical therapy to address the above stated deficits, provide pt/family education, and to maximize pt's level of independence.     Medical necessity is demonstrated by the following IMPAIRMENTS/PROBLEMS:  weakness, impaired endurance, impaired sensation, gait instability, impaired balance, decreased lower extremity function and impaired muscle length  Co-morbidities and personal factors: hypertension.  Activity Limitations: decreased stability with gait and standing balance .  Participation restrictions: unassisted gait and dynamic standing balance   Clinical presentation: stable and changing  Complexity: low  Pt's spiritual, cultural and educational needs considered and pt agreeable to plan of care and goals as stated below:     Anticipated  Barriers for physical therapy: none     Short Term GOALS:  -Tolerate HEP for hip flexibility, core strength and conditioning   - Moditfied indep with gait on even surfaces using SPC on R side     Long Term GOALS:  -Indep with HEP for hip and core strength, LLE strength, gait stability and standing balance   -Improved HS length + 10 degrees Phil  for improved pelvic alignment and gait stability   -Improved posterior hip strength Phil >4/5 for improved stability with gait and standing   -FOTO reporting < 35% limitation with all daily mobility    Plan     Certification Period: 4/10/17  - 6/9/17     Outpatient physical therapy 1- 2 times weekly to include: pt ed, HEP, therapeutic exercises, therapeutic activities, neuromuscular re-education/ balance exercises, manual therapy, and modalities prn. Cont PT for  6-8  weeks. Pt may be seen by PTA as part of the rehabilitation team.     I certify the need for these services furnished under this plan of treatment and while under my care.    Rupert Francis, PT

## 2017-04-11 ENCOUNTER — CLINICAL SUPPORT (OUTPATIENT)
Dept: REHABILITATION | Facility: HOSPITAL | Age: 82
End: 2017-04-11
Attending: INTERNAL MEDICINE
Payer: MEDICARE

## 2017-04-11 DIAGNOSIS — I63.442 EMBOLIC STROKE INVOLVING LEFT CEREBELLAR ARTERY: Primary | ICD-10-CM

## 2017-04-11 DIAGNOSIS — R29.898 LEFT LEG WEAKNESS: ICD-10-CM

## 2017-04-11 PROCEDURE — 97110 THERAPEUTIC EXERCISES: CPT | Mod: PN

## 2017-04-11 NOTE — PROGRESS NOTES
OUTPATIENT PHYSICAL THERAPY      Name: Roby Rodriguez  Clinic Number: [id number]    Evaluation Date: 04/11/2017  Visit #: 2/20   Precautions: fall risk, cardiac     Diagnosis:   Encounter Diagnoses   Name Primary?    Embolic stroke involving left cerebellar artery Yes    Left leg weakness      Physician: Nelida Eduardo MD  Treatment Orders: PT Eval and Treat  Past Medical History:   Diagnosis Date    *Atrial fibrillation     Allergy     Arthritis     Atrial fibrillation     Basal cell carcinoma 10/12/10    left earlobe    Basal cell carcinoma 7/23/2014    right neck    Cancer     Hx of Squamous Cell CA Scalp    Conductive hearing loss, bilateral     Chronic Eustachian Tube Dysfunction    Frequency of urination     Gastroenteritis     Headache     History of colonoscopy     reportedly normal in 2004.    Hypertension     Kidney stones     Otitis media     SQUAMOUS CELL CARCINOMA 7/27/07    SCC ant scalp    Supraventricular tachycardia      Current Outpatient Prescriptions   Medication Sig    antipyrine-benzocaine (AURALGAN OR EQUIV) 5.4-1.4 % Drop PLACE 3 DROPS INTO BOTH EARS 4 (FOUR) TIMES DAILY.    aspirin 81 MG Chew Take 1 tablet (81 mg total) by mouth once daily.    atorvastatin (LIPITOR) 40 MG tablet Take 1 tablet (40 mg total) by mouth once daily.    guanfacine (TENEX) 1 MG Tab Take 1 tablet (1 mg total) by mouth every evening.    lisinopril (PRINIVIL,ZESTRIL) 20 MG tablet Take 1 tablet (20 mg total) by mouth 2 (two) times daily.    metoprolol succinate (TOPROL-XL) 50 MG 24 hr tablet TAKE 1 TABLET ONCE DAILY    warfarin (COUMADIN) 5 MG tablet Take 0.5-1 tablets (2.5-5 mg total) by mouth Daily. Take 1/2 to 1 tablet every evening as instructed by [facility]     No current facility-administered medications for this visit.      Review of patient's allergies indicates:   Allergen Reactions    Amlodipine      Intolerant secondary to dizziness    Dyazide  "[triamterene-hydrochlorothiazid]      Significant weakness with his diuretic.       Subjective   History of Present Illness: Recent CVA with L sided weakness and decline in stability/independence with gait, mobility and standing balance   Chief complaint: decreased stability with gait and standing balance   Pain: current 0/10,    Pts goals: improve stability and independence with walking and standing    Objective     Arrived ambulating with SPC, continues with unsteady gait requiring SBA/Supv    TREATMENT     Discussed Plan of Care with patient: Yes    Roby received 60  minutes of therapeutic exercise for improved strength, conditioning, postural stability including:     10 min recumbent bike level 2-4 for increased LE conditioning  UBE level 1 resistance 3'f/2'bk x 5 min   Seated Hamstring stretches with towel roll x 10 breath cycles   Piriformis stretch   Psoas stretch supine   Standing calf stretch on board x 30"     LTR with LEs on ball x 20   SL clamshells   Bridges 2 x 10    Standing   Lateral step ups on 4" step x 10        Written Home Exercises Provided: yes  Exercises were reviewed and Roby was able to demonstrate them prior to the end of the session. Pt received a written copy of exercises to perform at home. Roby demonstrated good  understanding of the education provided.     Assessment    Roby is a 86 y.o. male referred to outpatient physical therapy with a medical diagnosis of Left sided weakness with gait and balance deficits due to recent CVA .   Demonstrates limitations as described in the problem list.  Pt prognosis is Good. Positive prognostic factors include Motivated, family support. Negative prognostic factors include age, postural changes .  Pt will benefit from skilled outpatient physical therapy to address the above stated deficits, provide pt/family education, and to maximize pt's level of independence.     Short Term GOALS:  -Tolerate HEP for hip flexibility, core strength and " conditioning   - Moditfied indep with gait on even surfaces using SPC on R side     Long Term GOALS:  -Indep with HEP for hip and core strength, LLE strength, gait stability and standing balance   -Improved HS length + 10 degrees Phil  for improved pelvic alignment and gait stability   -Improved posterior hip strength Phil >4/5 for improved stability with gait and standing   -FOTO reporting < 35% limitation with all daily mobility    Plan     Certification Period: 4/10/17  - 6/9/17     Outpatient physical therapy 1- 2 times weekly to include: pt ed, HEP, therapeutic exercises, therapeutic activities, neuromuscular re-education/ balance exercises, manual therapy, and modalities prn. Cont PT for  6-8  weeks. Pt may be seen by PTA as part of the rehabilitation team.     I certify the need for these services furnished under this plan of treatment and while under my care.    Rupert Francis, PT

## 2017-04-12 ENCOUNTER — TELEPHONE (OUTPATIENT)
Dept: ELECTROPHYSIOLOGY | Facility: CLINIC | Age: 82
End: 2017-04-12

## 2017-04-12 ENCOUNTER — ANTI-COAG VISIT (OUTPATIENT)
Dept: CARDIOLOGY | Facility: CLINIC | Age: 82
End: 2017-04-12

## 2017-04-12 ENCOUNTER — OFFICE VISIT (OUTPATIENT)
Dept: ELECTROPHYSIOLOGY | Facility: CLINIC | Age: 82
End: 2017-04-12
Payer: MEDICARE

## 2017-04-12 ENCOUNTER — HOSPITAL ENCOUNTER (OUTPATIENT)
Dept: CARDIOLOGY | Facility: CLINIC | Age: 82
Discharge: HOME | End: 2017-04-12
Payer: MEDICARE

## 2017-04-12 VITALS
HEIGHT: 72 IN | HEART RATE: 79 BPM | WEIGHT: 188.25 LBS | BODY MASS INDEX: 25.5 KG/M2 | DIASTOLIC BLOOD PRESSURE: 76 MMHG | SYSTOLIC BLOOD PRESSURE: 114 MMHG

## 2017-04-12 DIAGNOSIS — I63.442 EMBOLIC STROKE INVOLVING LEFT CEREBELLAR ARTERY: ICD-10-CM

## 2017-04-12 DIAGNOSIS — R00.1 BRADYCARDIA: ICD-10-CM

## 2017-04-12 DIAGNOSIS — I47.10 SVT (SUPRAVENTRICULAR TACHYCARDIA): ICD-10-CM

## 2017-04-12 DIAGNOSIS — I49.5 TACHY-BRADY SYNDROME: Primary | ICD-10-CM

## 2017-04-12 DIAGNOSIS — Z95.0 PACEMAKER: ICD-10-CM

## 2017-04-12 DIAGNOSIS — I10 ESSENTIAL HYPERTENSION: ICD-10-CM

## 2017-04-12 DIAGNOSIS — Z79.01 LONG TERM (CURRENT) USE OF ANTICOAGULANTS: ICD-10-CM

## 2017-04-12 DIAGNOSIS — I48.0 PAROXYSMAL ATRIAL FIBRILLATION: ICD-10-CM

## 2017-04-12 PROCEDURE — 93010 ELECTROCARDIOGRAM REPORT: CPT | Mod: S$PBB,,, | Performed by: INTERNAL MEDICINE

## 2017-04-12 PROCEDURE — 99213 OFFICE O/P EST LOW 20 MIN: CPT | Mod: PBBFAC | Performed by: INTERNAL MEDICINE

## 2017-04-12 PROCEDURE — 99214 OFFICE O/P EST MOD 30 MIN: CPT | Mod: S$PBB,,, | Performed by: INTERNAL MEDICINE

## 2017-04-12 PROCEDURE — 99999 PR PBB SHADOW E&M-EST. PATIENT-LVL III: CPT | Mod: PBBFAC,,, | Performed by: INTERNAL MEDICINE

## 2017-04-12 NOTE — LETTER
April 12, 2017      Sloan Friedman Jr., MD  [address]  [facility] [address]           [facility]  [address]  [facility] [address]  Phone: [phone]  Fax: [phone]          Patient: Roby Rodriguez   MR Number: [id number]   YOB: 1930   Date of Visit: 4/12/2017       Dear Dr. Sloan Friedman Jr.:    Thank you for referring Roby Rodriguez to me for evaluation. Attached you will find relevant portions of my assessment and plan of care.    If you have questions, please do not hesitate to call me. I look forward to following Roby Rodriguez along with you.    Sincerely,    Vincenzo Velazquez MD    Enclosure  CC:  No Recipients    If you would like to receive this communication electronically, please contact [email or web address] or [phone] to request more information on Weeb[employer] Link access.    For providers and/or their staff who would like to refer a patient to [facility], please contact us through our one-stop-shop provider referral line, [facility], at [phone].    If you feel you have received this communication in error or would no longer like to receive these types of communications, please e-mail [email or web address]

## 2017-04-12 NOTE — PROGRESS NOTES
Subjective:    Patient ID:  Roby Rodriguez is a 86 y.o. male who presents for follow-up of SVT and Pacemaker Check      HPI 85 yo male with Htn, PAC's, PVC's, tachy-kunal syndrome, PPM, CVA.     H/o recurrent dizziness, and atrial arrhythmias.  Holter monitor obtained, revealed nsr with >1900 PVC's and > 4500 PAC's, with runs of nonsustained AT   Event monitor: one symptomatic transmission. Revealed nsr >> atrial fibrillation. F/u transmission one hour later revealed nsr. Placed on Toprol 25 mg daily. Significant decrease in burden of ectopy and felt better.   Dual chamber PPM implanted 7/22/13.   Had an episode of syncope 1/14, while standing. Device interrogation was negative.   Subsequent device interrogation revealed atrial fibrillation with RVR. Toprol increased, coumadin initiated. Noted significant improvement in terms of dizziness and energy level.  Presented 3/31/17 with stroke.  Thought to be cardio-embolic in origin.  INR subtherapeutic at 1.7 upon admission.  Aspirin added.  Device interrogation reveals stable function of leads, 91% RA pacing 1% RV pacing, no significant arrhythmias.      Review of Systems   Constitution: Negative. Negative for weakness and malaise/fatigue.   Cardiovascular: Negative for chest pain, dyspnea on exertion, irregular heartbeat, leg swelling, near-syncope, orthopnea, palpitations, paroxysmal nocturnal dyspnea and syncope.   Respiratory: Negative for cough and shortness of breath.    Neurological: Negative for dizziness and light-headedness.   All other systems reviewed and are negative.       Objective:    Physical Exam   Constitutional: He is oriented to person, place, and time. He appears well-developed and well-nourished.   Eyes: Conjunctivae are normal. No scleral icterus.   Neck: No JVD present. No tracheal deviation present.   Cardiovascular: Normal rate, regular rhythm and normal heart sounds.  PMI is not displaced.    Pulmonary/Chest: Effort normal and breath sounds  normal. No respiratory distress.   Abdominal: Soft. There is no hepatosplenomegaly. There is no tenderness.   Musculoskeletal: He exhibits no edema (lower extremity) or tenderness.   Neurological: He is alert and oriented to person, place, and time.   Skin: Skin is warm and dry. No rash noted.   Psychiatric: He has a normal mood and affect. His behavior is normal.         Assessment:       1. Tachy-kunal syndrome    2. Paroxysmal atrial fibrillation    3. SVT (supraventricular tachycardia)    4. Essential hypertension    5. Embolic stroke involving left cerebellar artery    6. Pacemaker    7. Bradycardia         Plan:       Had stroke when INR sub-therapeutic.  Recommend switching to DOAC, as he will be having constant steady state effect, with less opportunity to be subtherapeutic.  Discontinue coumadin.  Xarelto 20 mg daily, take just after dinner.  Discontinue coumadin today.  Initiate Xarelto tomorrow.  I would rather he is slightly over-anticoagulated as opposed to having a period of time being under-anticoagulated, given the recent stroke.  Does he need to continue aspirin at this time, given his stroke was likely cardio-embolic and he was subtherapeutic?  I will ask Dr. Licona to comment.  F/u with me in 6 months.

## 2017-04-12 NOTE — TELEPHONE ENCOUNTER
----- Message from Sloan Michelle MD sent at 4/12/2017  1:44 PM CDT -----  Sheela Loya.  I am fine with Xarelto monotherapy (ie, no need for ASA too).  Rich    ----- Message -----     From: Vincenzo Velazquez MD     Sent: 4/12/2017  10:59 AM       To: Sloan Michelle MD

## 2017-04-12 NOTE — MR AVS SNAPSHOT
[facility]  [address]  [place] [address]  Phone: [phone]  Fax: [phone]                  Roby Rodriguez   2017 10:40 AM   Office Visit    Description:  Male : 1930   Provider:  Vincenzo Velazquez MD   Department:  [facility]           Reason for Visit     SVT     Pacemaker Check           Diagnoses this Visit        Comments    Tachy-kunal syndrome    -  Primary     Paroxysmal atrial fibrillation         SVT (supraventricular tachycardia)         Essential hypertension         Embolic stroke involving left cerebellar artery         Pacemaker         Bradycardia                To Do List           Future Appointments        Provider Department Dept Phone    2017 1:00 PM Katherine Gonzales PTA [facility] [phone]    2017 3:00 PM Katherine Gonzales PTA [facility] [phone]    2017 2:00 PM Rupert Francis, MARIA TERESA [facility] [phone]    2017 2:00 PM Rupert Francis, MARIA TERESA [facility] [phone]    2017 2:00 PM Rupert Francis, PT [facility] [phone]      Goals (5 Years of Data)     None      Follow-Up and Disposition     Return in about 6 months (around 10/12/2017).    Follow-up and Disposition History       These Medications        Disp Refills Start End    rivaroxaban (XARELTO) 20 mg Tab 90 tablet 3 2017     Take 1 tablet (20 mg total) by mouth once daily. - Oral    Pharmacy: [facility]/pharmacy #[id number] - [place] - [address] Ph #: [phone]         [facility]     [facility] On Call Nurse Care Line -  Assistance  Unless otherwise directed by your provider, please contact [facility], our nurse care line that is available for  assistance.     Registered nurses in the [facility] provide: appointment scheduling, clinical advisement, health education, and other advisory services.  Call: [phone] (toll free)                Medications           Message regarding Medications     Verify the changes and/or additions to your medication regime listed below are the same as discussed with your clinician today.  If any of these changes or additions are incorrect, please notify your healthcare provider.        START taking these NEW medications        Refills    rivaroxaban (XARELTO) 20 mg Tab 3    Sig: Take 1 tablet (20 mg total) by mouth once daily.    Class: Normal    Route: Oral      STOP taking these medications     warfarin (COUMADIN) 5 MG tablet Take 0.5-1 tablets (2.5-5 mg total) by mouth Daily. Take 1/2 to 1 tablet every evening as instructed by [facility]           Verify that the below list of medications is an accurate representation of the medications you are currently taking.  If none reported, the list may be blank. If incorrect, please contact your healthcare provider. Carry this list with you in case of emergency.           Current Medications     antipyrine-benzocaine (AURALGAN OR EQUIV) 5.4-1.4 % Drop PLACE 3 DROPS INTO BOTH EARS 4 (FOUR) TIMES DAILY.    aspirin 81 MG Chew Take 1 tablet (81 mg total) by mouth once daily.    atorvastatin (LIPITOR) 40 MG tablet Take 1 tablet (40 mg total) by mouth once daily.    guanfacine (TENEX) 1 MG Tab Take 1 tablet (1 mg total) by mouth every evening.    lisinopril (PRINIVIL,ZESTRIL) 20 MG tablet Take 1 tablet (20 mg total) by mouth 2 (two) times daily.    metoprolol succinate (TOPROL-XL) 50 MG 24 hr tablet TAKE 1 TABLET ONCE DAILY    rivaroxaban (XARELTO) 20 mg Tab Take 1 tablet (20 mg total) by mouth once daily.           Clinical Reference Information           Your Vitals Were     BP Pulse Height Weight BMI    114/76 79 6' (1.829 m) 85.4 kg (188 lb 4.4 oz) 25.53 kg/m2      Blood Pressure          Most Recent Value    BP  114/76      Allergies as of 4/12/2017     Amlodipine    Dyazide [Triamterene-hydrochlorothiazid]      Immunizations Administered on Date of Encounter -  4/12/2017     None      Language Assistance Services     ATTENTION: Language assistance services are available, free of charge. Please call [phone].      ATENCIÓN: Si habla johan, tiene a martinez disposición servicios gratuitos de asistencia lingüística. Llame al [phone].     CHÚ Ý: N?u b?n nói Ti?ng Vi?t, có các d?ch v? h? tr? ngôn ng? mi?n phí dành cho b?n. G?i s? [phone].         [facility] complies with applicable Federal civil rights laws and does not discriminate on the basis of race, color, national origin, age, disability, or sex.

## 2017-04-13 ENCOUNTER — TELEPHONE (OUTPATIENT)
Dept: ELECTROPHYSIOLOGY | Facility: CLINIC | Age: 82
End: 2017-04-13

## 2017-04-13 NOTE — TELEPHONE ENCOUNTER
Pt unavailable. Spoke with Mercy, Pt's daughter. She states she takes care of her father. Informed her OK to discontinue Aspirin, however, she should continue taking Xarelto. She verbalized understanding and stated he is going to start taking Xarelto today. Denied questions, needs and concerns.

## 2017-04-18 ENCOUNTER — CLINICAL SUPPORT (OUTPATIENT)
Dept: REHABILITATION | Facility: HOSPITAL | Age: 82
End: 2017-04-18
Attending: INTERNAL MEDICINE
Payer: MEDICARE

## 2017-04-18 DIAGNOSIS — R29.898 LEFT LEG WEAKNESS: Primary | ICD-10-CM

## 2017-04-18 PROCEDURE — 97110 THERAPEUTIC EXERCISES: CPT | Mod: PN

## 2017-04-18 NOTE — PROGRESS NOTES
"OUTPATIENT PHYSICAL THERAPY      Name: Roby Rodriguez  Clinic Number: [id number]    Evaluation Date: 04/18/2017  Visit #: 3/20   Precautions: fall risk, cardiac     Diagnosis:   Encounter Diagnosis   Name Primary?    Left leg weakness Yes     Physician: Nelida Eduardo MD    Subjective   History of Present Illness: Recent CVA with L sided weakness and decline in stability/independence with gait, mobility and standing balance   Chief complaint: decreased stability with gait and standing balance   Pain: current 0/10,    Pts goals: improve stability and independence with walking and standing    Objective     Arrived ambulating with SPC, continues with unsteady gait requiring SBA/Supv    TREATMENT     Discussed Plan of Care with patient: Yes    Roby received 60 minutes (30 min 1:1) of therapeutic exercise for improved strength, conditioning, postural stability including:     10 min recumbent bike level 2-4 for increased LE conditioning  UBE level 1 resistance 3'f/2'bk x 5 min   Seated Hamstring stretches with towel roll x 10 breath cycles   Piriformis stretch   Psoas stretch supine   Standing calf stretch on board x 30"     LTR with LEs on blue s-ball x 20   SL clamshells   Bridges 2 x 10    Standing   Lateral step ups on 4" step x 10   Sidesteps in // bars 3 laps  Shuttle B LE 2.5B 3x10, L LE 2B 2x10       Written Home Exercises Provided: yes  Exercises were reviewed and Roby was able to demonstrate them prior to the end of the session. Pt received a written copy of exercises to perform at home. Roby demonstrated good  understanding of the education provided.     Assessment    Roby is a 86 y.o. male referred to outpatient physical therapy with a medical diagnosis of Left sided weakness with gait and balance deficits due to recent CVA .   Demonstrates limitations as described in the problem list.  Pt prognosis is Good. Positive prognostic factors include Motivated, family support. Negative prognostic " factors include age, postural changes .  Pt will benefit from skilled outpatient physical therapy to address the above stated deficits, provide pt/family education, and to maximize pt's level of independence.     Pt tolerated treatment session well this date without onset of pain or soreness and 0/10 pain at end of session. He continues with L LE weakness and ambulates with hyperextension of L knee in stance phase. Requires moderate verbal cues to decrease speed with pace of exercises and ambulation. He continues with strength and balance deficits and will benefit from continued treatment.    Short Term GOALS:  -Tolerate HEP for hip flexibility, core strength and conditioning   - Moditfied indep with gait on even surfaces using SPC on R side     Long Term GOALS:  -Indep with HEP for hip and core strength, LLE strength, gait stability and standing balance   -Improved HS length + 10 degrees Phil  for improved pelvic alignment and gait stability   -Improved posterior hip strength Phil >4/5 for improved stability with gait and standing   -FOTO reporting < 35% limitation with all daily mobility    Plan     Certification Period: 4/10/17  - 6/9/17     Outpatient physical therapy 1- 2 times weekly to include: pt ed, HEP, therapeutic exercises, therapeutic activities, neuromuscular re-education/ balance exercises, manual therapy, and modalities prn. Cont PT for  6-8  weeks. Pt may be seen by PTA as part of the rehabilitation team.     I certify the need for these services furnished under this plan of treatment and while under my care.    Katherine Gonzales, PTA

## 2017-04-20 ENCOUNTER — CLINICAL SUPPORT (OUTPATIENT)
Dept: REHABILITATION | Facility: HOSPITAL | Age: 82
End: 2017-04-20
Attending: INTERNAL MEDICINE
Payer: MEDICARE

## 2017-04-20 DIAGNOSIS — R29.898 LEFT LEG WEAKNESS: Primary | ICD-10-CM

## 2017-04-20 DIAGNOSIS — I63.442 EMBOLIC STROKE INVOLVING LEFT CEREBELLAR ARTERY: ICD-10-CM

## 2017-04-20 PROCEDURE — 97110 THERAPEUTIC EXERCISES: CPT | Mod: PN

## 2017-04-20 NOTE — PROGRESS NOTES
"OUTPATIENT PHYSICAL THERAPY      Name: Roby Rodriguez  Clinic Number: [id number]    Evaluation Date: 04/20/2017  Visit #: 4/20   Precautions: fall risk, cardiac     Diagnosis:   Encounter Diagnoses   Name Primary?    Left leg weakness Yes    Embolic stroke involving left cerebellar artery      Physician: Nelida Eduardo MD    Subjective   History of Present Illness: Recent CVA with L sided weakness and decline in stability/independence with gait, mobility and standing balance   Chief complaint: decreased stability with gait and standing balance   Pain: current 0/10  States he is feeling better and stronger each day.  Pts goals: improve stability and independence with walking and standing    Objective     Arrived ambulating with SPC, continues with unsteady gait requiring SBA/Supv    TREATMENT     Discussed Plan of Care with patient: Yes    Roby received 60 minutes of therapeutic exercise for improved strength, conditioning, postural stability including:     10 min recumbent bike level 2-4 for increased LE conditioning  UBE level 1 resistance 3'f/2'bk x 5 min   Seated Hamstring stretches with towel roll x 10 breath cycles (NP)  Piriformis stretch (NP)  Psoas stretch supine (NP)  Standing calf stretch on board x 30"     LTR with LEs on blue s-ball x 20   Supine hip add sqz 20 x 5"  SL clamshells OTB x 10 each  Bridges 2 x 10    Standing   Heel taps on 8" step with U UE assist 2 x 15  Lateral step ups on 4" step x 10   Sidesteps in // bars YTB 3 laps  Shuttle B LE 3B 2x10, L LE 2B 2x10       Written Home Exercises Provided: yes, pt and his daughter were educated on additional SL CLAMs with YTB.  Exercises were reviewed and Roby was able to demonstrate them prior to the end of the session. Pt received a written copy of exercises to perform at home. Roby demonstrated good  understanding of the education provided.     Assessment    Roby is a 86 y.o. male referred to outpatient physical therapy with a " medical diagnosis of Left sided weakness with gait and balance deficits due to recent CVA .   Demonstrates limitations as described in the problem list.  Pt prognosis is Good. Positive prognostic factors include Motivated, family support. Negative prognostic factors include age, postural changes. Pt will benefit from skilled outpatient physical therapy to address the above stated deficits, provide pt/family education, and to maximize pt's level of independence.     Roby demonstrated good tolerance to treatment this date. No reports of soreness or pain. Increased control of L LE on shuttle for avoidance of hyperextension. Reported 0/10 pain at end of session. He continues with L LE weakness and ambulates with hyperextension of L knee in stance phase, mild unsteadiness at end of session likely due to fatigue. He requires moderate verbal cues for proper sequencing occasionally as well as to decrease speed with pace of exercises and ambulation. He will benefit from continued strength and balance training.     Short Term GOALS:  -Tolerate HEP for hip flexibility, core strength and conditioning   - Moditfied indep with gait on even surfaces using SPC on R side     Long Term GOALS:  -Indep with HEP for hip and core strength, LLE strength, gait stability and standing balance   -Improved HS length + 10 degrees Phil  for improved pelvic alignment and gait stability   -Improved posterior hip strength Phil >4/5 for improved stability with gait and standing   -FOTO reporting < 35% limitation with all daily mobility    Plan     Certification Period: 4/10/17  - 6/9/17     Outpatient physical therapy 1- 2 times weekly to include: pt ed, HEP, therapeutic exercises, therapeutic activities, neuromuscular re-education/ balance exercises, manual therapy, and modalities prn. Cont PT for  6-8  weeks. Pt may be seen by PTA as part of the rehabilitation team.     I certify the need for these services furnished under this plan of treatment  and while under my care.    Katherine Gonzales, PTA

## 2017-04-24 ENCOUNTER — CLINICAL SUPPORT (OUTPATIENT)
Dept: REHABILITATION | Facility: HOSPITAL | Age: 82
End: 2017-04-24
Attending: INTERNAL MEDICINE
Payer: MEDICARE

## 2017-04-24 DIAGNOSIS — R29.898 LEFT LEG WEAKNESS: Primary | ICD-10-CM

## 2017-04-24 PROCEDURE — 97110 THERAPEUTIC EXERCISES: CPT | Mod: PN

## 2017-04-24 NOTE — PROGRESS NOTES
"OUTPATIENT PHYSICAL THERAPY      Name: Roby Rodriguez  Clinic Number: [id number]    Evaluation Date: 2017  Visit #:    Precautions: fall risk, cardiac     Diagnosis:   Encounter Diagnosis   Name Primary?    Left leg weakness Yes     Physician: Nelida Eduardo MD    Subjective   History of Present Illness: Recent CVA with L sided weakness and decline in stability/independence with gait, mobility and standing balance   Chief complaint: decreased stability with gait and standing balance   Pain: current 0/10  States he is feeling better and stronger each day.  Pts goals: improve stability and independence with walking and standing    Objective     Arrived ambulating with SPC, continues with unsteady gait requiring SBA/Supv    TREATMENT     Discussed Plan of Care with patient: Yes    Roby received 60 minutes of therapeutic exercise for improved strength, conditioning, postural stability includin  min recumbent bike level 2-4 for increased LE conditioning    UBE level 1 resistance 2'f/2'bk x 4 min   Seated Hamstring stretches with towel roll x 10 breath cycles   Piriformis stretch (NP)  Psoas stretch supine (NP)  Standing calf stretch on board x 30"     LTR with LEs on blue s-ball x 2'  Supine hip add sqz 20 x 5" (NP)  SL clamshells OTB x 10 each (HEP)   Bridges 2 x 10 with RTB    Standing   Gait conditioning exercises 1 set x 10 reps     ~Not performed   Heel taps on 8" step with U UE assist 2 x 15  Lateral step ups on 4" step x 10   Sidesteps in // bars YTB 3 laps  Shuttle B LE 3B 2x10, L LE 2B 2x10      Written Home Exercises Provided: 17: added gait conditioning exercises 1-2 x per day at home.   Exercises were reviewed and Roby was able to demonstrate them prior to the end of the session. Pt received a written copy of exercises to perform at home. Roby demonstrated good  understanding of the education provided.     Assessment    Roby is a 86 y.o. male referred to outpatient " physical therapy with a medical diagnosis of Left sided weakness with gait and balance deficits due to recent CVA .   Demonstrates limitations as described in the problem list.    Roby demonstrated good tolerance to treatment this date. No reports of soreness or pain. Given up;dated HEP fof gait conditioning at home.  Reported 0/10 pain at end of session. He continues with L LE weakness and ambulates with hyperextension of L knee in stance phase, mild unsteadiness at end of session likely due to fatigue. He requires moderate verbal cues for proper sequencing occasionally as well as to decrease speed with pace of exercises and ambulation. He will benefit from continued strength and balance training.     Short Term GOALS:  -Tolerate HEP for hip flexibility, core strength and conditioning   - Moditfied indep with gait on even surfaces using SPC on R side     Long Term GOALS:  -Indep with HEP for hip and core strength, LLE strength, gait stability and standing balance   -Improved HS length + 10 degrees Phil  for improved pelvic alignment and gait stability   -Improved posterior hip strength Phil >4/5 for improved stability with gait and standing   -FOTO reporting < 35% limitation with all daily mobility    Plan     Certification Period: 4/10/17  - 6/9/17     Outpatient physical therapy 1- 2 times weekly to include: pt ed, HEP, therapeutic exercises, therapeutic activities, neuromuscular re-education/ balance exercises, manual therapy, and modalities prn. Cont PT for  6-8  weeks. Pt may be seen by PTA as part of the rehabilitation team.       Rupert Francis, PT

## 2017-04-25 ENCOUNTER — TELEPHONE (OUTPATIENT)
Dept: ELECTROPHYSIOLOGY | Facility: CLINIC | Age: 82
End: 2017-04-25

## 2017-04-26 ENCOUNTER — CLINICAL SUPPORT (OUTPATIENT)
Dept: REHABILITATION | Facility: HOSPITAL | Age: 82
End: 2017-04-26
Attending: INTERNAL MEDICINE
Payer: MEDICARE

## 2017-04-26 ENCOUNTER — HOME CARE VISIT (OUTPATIENT)
Dept: NEUROLOGY | Facility: HOSPITAL | Age: 82
End: 2017-04-26

## 2017-04-26 DIAGNOSIS — R29.898 LEFT LEG WEAKNESS: Primary | ICD-10-CM

## 2017-04-26 PROCEDURE — 97112 NEUROMUSCULAR REEDUCATION: CPT | Mod: PN

## 2017-04-26 PROCEDURE — 97110 THERAPEUTIC EXERCISES: CPT | Mod: PN

## 2017-04-26 NOTE — PROGRESS NOTES
"OUTPATIENT PHYSICAL THERAPY      Name: Roby Rodriguez  Clinic Number: [id number]    Evaluation Date: 2017  Visit #:    Precautions: fall risk, cardiac     Diagnosis:   Encounter Diagnosis   Name Primary?    Left leg weakness Yes     Physician: Nelida Eduardo MD    Subjective   History of Present Illness: Recent CVA with L sided weakness and decline in stability/independence with gait, mobility and standing balance   Chief complaint: decreased stability with gait and standing balance   Pain: current 0/10  States he is feeling better and stronger each day.  Pts goals: improve stability and independence with walking and standing    Objective     Arrived ambulating with SPC, continues with unsteady gait requiring SBA/Supv    TREATMENT     Discussed Plan of Care with patient: Yes    Roby received 45 minutes of therapeutic exercise for improved strength, conditioning, postural stability includin  min recumbent bike level 2-4 for increased LE conditioning  UBE level 1 resistance 2'f/2'bk x 4 min     Contract/relax HS stretch supine x3 sets Phil   Piriformis stretch   Psoas stretch supine   Standing calf stretch on board x 30"     LTR with LEs on blue s-ball x 2'  Supine hip add sqz 20 x 5" (NP)  SL clamshells OTB x 10 each   Bridges 2 x 10     Shuttle B LE 3B 2x10, L LE 2B 2x10    Neuromuscular Re-education for improved balance and proprioception x 15 min   Sidesteps in // bars YTB 3 laps  SLS with UE support x 10" x 5 B   Lateral wt shifts in standing x 2'     HEP  Standing   Gait conditioning exercises 1 set x 10 reps     ~Not performed   Heel taps on 8" step with U UE assist 2 x 15  Lateral step ups on 4" step x 10       Written Home Exercises Provided: 17: added gait conditioning exercises 1-2 x per day at home.   Exercises were reviewed and Roby was able to demonstrate them prior to the end of the session. Pt received a written copy of exercises to perform at home. Roby " demonstrated good  understanding of the education provided.     Assessment    Roby is a 86 y.o. male referred to outpatient physical therapy with a medical diagnosis of Left sided weakness with gait and balance deficits due to recent CVA .     Cont with weak lateral hip stabilizers L > R, limiting SL stability        Short Term GOALS:  -Tolerate HEP for hip flexibility, core strength and conditioning   - Moditfied indep with gait on even surfaces using SPC on R side     Long Term GOALS:  -Indep with HEP for hip and core strength, LLE strength, gait stability and standing balance   -Improved HS length + 10 degrees Phil  for improved pelvic alignment and gait stability   -Improved posterior hip strength Phil >4/5 for improved stability with gait and standing   -FOTO reporting < 35% limitation with all daily mobility    Plan     Certification Period: 4/10/17  - 6/9/17     Outpatient physical therapy 1- 2 times weekly to include: pt ed, HEP, therapeutic exercises, therapeutic activities, neuromuscular re-education/ balance exercises, manual therapy, and modalities prn. Cont PT for  6-8  weeks. Pt may be seen by PTA as part of the rehabilitation team.       Rupert Francis, PT

## 2017-05-01 ENCOUNTER — CLINICAL SUPPORT (OUTPATIENT)
Dept: REHABILITATION | Facility: HOSPITAL | Age: 82
End: 2017-05-01
Attending: INTERNAL MEDICINE
Payer: MEDICARE

## 2017-05-01 DIAGNOSIS — R29.898 LEFT LEG WEAKNESS: Primary | ICD-10-CM

## 2017-05-01 DIAGNOSIS — I63.442 EMBOLIC STROKE INVOLVING LEFT CEREBELLAR ARTERY: ICD-10-CM

## 2017-05-01 PROCEDURE — 97112 NEUROMUSCULAR REEDUCATION: CPT | Mod: PN

## 2017-05-01 PROCEDURE — 97110 THERAPEUTIC EXERCISES: CPT | Mod: PN

## 2017-05-01 NOTE — PROGRESS NOTES
"OUTPATIENT PHYSICAL THERAPY      Name: Roby Rodriguez  Clinic Number: [id number]    Evaluation Date: 2017  Visit #:    Precautions: fall risk, cardiac     Diagnosis:   Encounter Diagnoses   Name Primary?    Left leg weakness Yes    Embolic stroke involving left cerebellar artery      Physician: Nelida Eduardo MD    Subjective   History of Present Illness: Recent CVA with L sided weakness and decline in stability/independence with gait, mobility and standing balance   Chief complaint: decreased stability with gait and standing balance   Pain: current 0/10  States he is feeling better and stronger each day.  Pts goals: improve stability and independence with walking and standing    Objective     Arrived ambulating with SPC, continues with unsteady gait requiring SBA/Supv    TREATMENT     Discussed Plan of Care with patient: Yes    Roby received 45 minutes of therapeutic exercise for improved strength, conditioning, postural stability includin  min recumbent bike level 2-4 for increased LE conditioning  UBE level 1 resistance 2'f/2'bk x 4 min     Contract/relax HS stretch supine x3 sets Phil   Piriformis stretch   Psoas stretch supine   Standing calf stretch on board x 30"     LTR with LEs on blue s-ball x 2'  Supine hip add sqz 20 x 5" (NP)  SL clamshells OTB x 10 each   Bridges 2 x 10     Shuttle B LE 3B 2x10; L LE 2B 2x10  Lateral step ups 4" 2 x 10 L    Neuromuscular Re-education for improved balance and proprioception x 15 min   Sidesteps in // bars YTB 3 laps  3" march x 2 laps   SLS with UE support x 10" x 5 B   Lateral wt shifts in standing x 2'     HEP  Standing   Gait conditioning exercises 1 set x 10 reps     ~Not performed   Heel taps on 8" step with U UE assist 2 x 15  Lateral step ups on 4" step x 10       Written Home Exercises Provided: 17: added gait conditioning exercises 1-2 x per day at home.   Exercises were reviewed and Roby was able to demonstrate them prior to " the end of the session. Pt received a written copy of exercises to perform at home. Roby demonstrated good  understanding of the education provided.     Assessment    Roby is a 86 y.o. male referred to outpatient physical therapy with a medical diagnosis of Left sided weakness with gait and balance deficits due to recent CVA .    Progressing strength of Left hip, continues with moderate path deviation to the Left secondary to decreased proprioception/coordination of LLE     Short Term GOALS:  -Tolerate HEP for hip flexibility, core strength and conditioning   - Moditfied indep with gait on even surfaces using SPC on R side     Long Term GOALS:  -Indep with HEP for hip and core strength, LLE strength, gait stability and standing balance   -Improved HS length + 10 degrees Phil  for improved pelvic alignment and gait stability   -Improved posterior hip strength Phil >4/5 for improved stability with gait and standing   -FOTO reporting < 35% limitation with all daily mobility    Plan     Certification Period: 4/10/17  - 6/9/17     Outpatient physical therapy 1- 2 times weekly to include: pt ed, HEP, therapeutic exercises, therapeutic activities, neuromuscular re-education/ balance exercises, manual therapy, and modalities prn. Cont PT for  6-8  weeks. Pt may be seen by PTA as part of the rehabilitation team.       Rupert Francis, PT

## 2017-05-02 ENCOUNTER — OFFICE VISIT (OUTPATIENT)
Dept: NEUROLOGY | Facility: CLINIC | Age: 82
End: 2017-05-02
Payer: MEDICARE

## 2017-05-02 VITALS
WEIGHT: 194 LBS | HEIGHT: 73 IN | SYSTOLIC BLOOD PRESSURE: 124 MMHG | DIASTOLIC BLOOD PRESSURE: 78 MMHG | HEART RATE: 71 BPM | BODY MASS INDEX: 25.71 KG/M2

## 2017-05-02 DIAGNOSIS — I10 ESSENTIAL HYPERTENSION: ICD-10-CM

## 2017-05-02 DIAGNOSIS — I63.9 COMPLETED STROKE: Primary | ICD-10-CM

## 2017-05-02 DIAGNOSIS — I48.0 PAROXYSMAL ATRIAL FIBRILLATION: ICD-10-CM

## 2017-05-02 DIAGNOSIS — Z79.01 LONG TERM (CURRENT) USE OF ANTICOAGULANTS: ICD-10-CM

## 2017-05-02 PROBLEM — R27.0: Status: RESOLVED | Noted: 2017-03-31 | Resolved: 2017-05-02

## 2017-05-02 PROBLEM — R29.898 LEFT LEG WEAKNESS: Status: RESOLVED | Noted: 2017-04-10 | Resolved: 2017-05-02

## 2017-05-02 PROCEDURE — 99213 OFFICE O/P EST LOW 20 MIN: CPT | Mod: PBBFAC | Performed by: PSYCHIATRY & NEUROLOGY

## 2017-05-02 PROCEDURE — 99214 OFFICE O/P EST MOD 30 MIN: CPT | Mod: S$PBB,,, | Performed by: PSYCHIATRY & NEUROLOGY

## 2017-05-02 PROCEDURE — 99999 PR PBB SHADOW E&M-EST. PATIENT-LVL III: CPT | Mod: PBBFAC,,, | Performed by: PSYCHIATRY & NEUROLOGY

## 2017-05-02 RX ORDER — ATORVASTATIN CALCIUM 40 MG/1
40 TABLET, FILM COATED ORAL DAILY
Qty: 90 TABLET | Refills: 3 | Status: SHIPPED | OUTPATIENT
Start: 2017-05-09 | End: 2018-05-04 | Stop reason: SDUPTHER

## 2017-05-02 RX ORDER — ATORVASTATIN CALCIUM 40 MG/1
40 TABLET, FILM COATED ORAL DAILY
Qty: 30 TABLET | Refills: 0 | Status: SHIPPED | OUTPATIENT
Start: 2017-05-02 | End: 2017-05-02 | Stop reason: SDUPTHER

## 2017-05-02 NOTE — PROGRESS NOTES
[facility]    Impression:  CT negative ischemic stroke Mar '17, likely R subcortical. 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       Plan:  1. Agree with Xarelto monotherapy  2. Continue atorvastatin 40mg/d (refill to local pharmacy x 1 month then [facility] 3 month supply)  3. Encouraged continued exercise; No restrictions  4. RTC prn      85 y/o M here for hospital f/u.  He presents with his daughter. He had a CT negative L ataxic-hemiparesis and was admitted to [facility] [place] - 4/2/17.  He has been switched from ASA/warfarin to Xarelto monotherapy.  No symptoms suggestive of recurrent cerebrovascular ischemia.  Tolerating atorvastatin and Xarelto.  Completing PT. Exercising.  Feels back to baseline.     Past Medical History:   Diagnosis Date    *Atrial fibrillation     Allergy     Arthritis     Atrial fibrillation     Basal cell carcinoma 10/12/10    left earlobe    Basal cell carcinoma 7/23/2014    right neck    Cancer     Hx of Squamous Cell CA Scalp    Conductive hearing loss, bilateral     Chronic Eustachian Tube Dysfunction    Frequency of urination     Gastroenteritis     Headache     History of colonoscopy     reportedly normal in 2004.    Hypertension     Kidney stones     Otitis media     SQUAMOUS CELL CARCINOMA 7/27/07    SCC ant scalp    Supraventricular tachycardia       Past Surgical History:   Procedure Laterality Date    ADENOIDECTOMY      Cardiac pacemaker implantation      COLONOSCOPY      Duputyren Contracture Right Hand      INSERT / REPLACE / REMOVE PACEMAKER      Resection of Skin Cancer Scalp      TONSILLECTOMY        Outpatient Prescriptions Marked as Taking for the 5/2/17 encounter (Office Visit) with Sloan Michelle MD   Medication Sig Dispense Refill    antipyrine-benzocaine (AURALGAN OR EQUIV) 5.4-1.4 % Drop PLACE 3 DROPS INTO BOTH EARS 4  "(FOUR) TIMES DAILY. 42 mL 4    atorvastatin (LIPITOR) 40 MG tablet Take 1 tablet (40 mg total) by mouth once daily. 30 tablet 0    guanfacine (TENEX) 1 MG Tab Take 1 tablet (1 mg total) by mouth every evening. 90 tablet 3    lisinopril (PRINIVIL,ZESTRIL) 20 MG tablet Take 1 tablet (20 mg total) by mouth 2 (two) times daily. 180 tablet 3    metoprolol succinate (TOPROL-XL) 50 MG 24 hr tablet TAKE 1 TABLET ONCE DAILY 90 tablet 0    rivaroxaban (XARELTO) 20 mg Tab Take 1 tablet (20 mg total) by mouth once daily. 90 tablet 3      Review of patient's allergies indicates:   Allergen Reactions    Amlodipine      Intolerant secondary to dizziness    Dyazide [triamterene-hydrochlorothiazid]      Significant weakness with his diuretic.      Family History   Problem Relation Age of Onset    Melanoma Neg Hx     Psoriasis Neg Hx     Lupus Neg Hx     Eczema Neg Hx       Social History     Social History    Marital status:      Spouse name: N/A    Number of children: N/A    Years of education: N/A     Occupational History    Retired      Social History Main Topics    Smoking status: Never Smoker    Smokeless tobacco: Never Used      Comment: The patient is becoming more active.    Alcohol use 3.5 oz/week     7 Standard drinks or equivalent per week    Drug use: No    Sexual activity: Not on file     Other Topics Concern    Not on file     Social History Narrative     Review Of Systems:  General: Negative for fever   HENT: Negative for tinnitus, nose bleeds, neck stiffness   Cardiac Negative for palpitations   Vascular: Negative for easy bruising   Pulmonary: Negative for cough   Gastrointestinal: Negative for constipation   Urinary: Negative for incomplete bladder emptying   Musculoskeletal: Negative for muscle aches   Neurological: As above. Otherwise negative for abnormal movements   Psychiatric:  Negative for depression       /78  Pulse 71  Ht 6' 1" (1.854 m)  Wt 88 kg (194 lb 0.1 oz)  BMI " 25.6 kg/m2   Well developed, well nourished male  Extremities: no edema    Mental status:   Awake, alert and appropriately oriented   Normal recent and remote memory   Normal attention and concentration   Normal speech and language   Normal fund of knowledge   No extinction  Cranial nerves:   PERRLA   EOMF without nystagmus   VFF   Normal facial sensation   L facial   Decreased hearing bilaterally   Palate elevates symmetrically   Normal SCM and trapezius strength   Tongue midline  Motor:   No pronator drift   Normal FF movements bilaterally   Normal muscle tone, bulk and power   No abnormal movements  Sensory   Intact to LT  Coordination   Intact to FNF, RAH, and HTS  Gait   Slightly wide based but steady    mRS = 0  NIHSS = 1      Data Reviewed:    Lab Results   Component Value Date    LDLCALC 99.4 03/31/2017     Sloan Michelle MD

## 2017-05-03 ENCOUNTER — CLINICAL SUPPORT (OUTPATIENT)
Dept: REHABILITATION | Facility: HOSPITAL | Age: 82
End: 2017-05-03
Attending: INTERNAL MEDICINE
Payer: MEDICARE

## 2017-05-03 DIAGNOSIS — I63.442 EMBOLIC STROKE INVOLVING LEFT CEREBELLAR ARTERY: ICD-10-CM

## 2017-05-03 PROCEDURE — 97112 NEUROMUSCULAR REEDUCATION: CPT | Mod: PN

## 2017-05-03 PROCEDURE — 97110 THERAPEUTIC EXERCISES: CPT | Mod: PN

## 2017-05-03 NOTE — PROGRESS NOTES
"OUTPATIENT PHYSICAL THERAPY      Name: Roby Rodriguez  Clinic Number: [id number]    Evaluation Date: 2017  Visit #:    Precautions: fall risk, cardiac     Diagnosis:   Encounter Diagnosis   Name Primary?    Embolic stroke involving left cerebellar artery      Physician: Nelida Eduardo MD    Subjective   History of Present Illness: Recent CVA with L sided weakness and decline in stability/independence with gait, mobility and standing balance   Chief complaint: decreased stability with gait and standing balance   Pain: current 0/10  Pt reports improved strength and stability. Per his daughter, he was discharged from his Neurologist and received a good report from him.   Pts goals: improve stability and independence with walking and standing    Objective     Arrived ambulating with SPC, continues with unsteady gait requiring SBA/Supv    TREATMENT     Discussed Plan of Care with patient: Yes    Roby received 45 minutes of therapeutic exercise for improved strength, conditioning, postural stability includin  min recumbent bike level 2-4 for increased LE conditioning  UBE level 1 resistance 2'f/2'bk x 4 min     Contract/relax HS stretch supine x3 sets Phil   Piriformis stretch   Psoas stretch supine   Standing calf stretch on board x 30"     LTR with LEs on blue s-ball x 2'  Supine hip add sqz 20 x 5" (NP)  SL clamshells OTB x 10 each   Bridges 2 x 10     Shuttle B LE 3B 3x10; L LE 2B 2x10  Lateral step ups 4" 2 x 10 L (NP)    Neuromuscular Re-education for improved balance and proprioception x 15 min   Sidesteps in // bars YTB 3 laps  3" march x 2 laps   SLS with UE support x 10" x 5 B   Lateral wt shifts in standing x 2'   Heel taps on 8" step with U UE assist 2 x 15      Written Home Exercises Provided: 17: added gait conditioning exercises 1-2 x per day at home.   Exercises were reviewed and Roby was able to demonstrate them prior to the end of the session. Pt received a written copy " of exercises to perform at home. Roby demonstrated good  understanding of the education provided.     Assessment    Roby is a 86 y.o. male referred to outpatient physical therapy with a medical diagnosis of Left sided weakness with gait and balance deficits due to recent CVA .    Pt tolerated treatment session well this date. He presents with mild improvement of L hip strength and stability with stepping activities. Pt continues with mild drift toward left during ambulation as mild limitation of L LE proprioception with activities in // bars. Pt compliant with HEP with help of his daughter.      Short Term GOALS:  -Tolerate HEP for hip flexibility, core strength and conditioning   - Moditfied indep with gait on even surfaces using SPC on R side     Long Term GOALS:  -Indep with HEP for hip and core strength, LLE strength, gait stability and standing balance   -Improved HS length + 10 degrees Phil  for improved pelvic alignment and gait stability   -Improved posterior hip strength Phil >4/5 for improved stability with gait and standing   -FOTO reporting < 35% limitation with all daily mobility    Plan     Certification Period: 4/10/17  - 6/9/17     Outpatient physical therapy 1- 2 times weekly to include: pt ed, HEP, therapeutic exercises, therapeutic activities, neuromuscular re-education/ balance exercises, manual therapy, and modalities prn. Cont PT for  6-8  weeks. Pt may be seen by PTA as part of the rehabilitation team.       Katherine Gonzales, BRI

## 2017-05-05 RX ORDER — GUANFACINE 1 MG/1
TABLET ORAL
Qty: 90 TABLET | Refills: 0 | Status: SHIPPED | OUTPATIENT
Start: 2017-05-05 | End: 2017-09-16 | Stop reason: SDUPTHER

## 2017-05-08 RX ORDER — METOPROLOL SUCCINATE 50 MG/1
TABLET, EXTENDED RELEASE ORAL
Qty: 90 TABLET | Refills: 3 | Status: SHIPPED | OUTPATIENT
Start: 2017-05-08 | End: 2018-07-30 | Stop reason: SDUPTHER

## 2017-05-10 ENCOUNTER — CLINICAL SUPPORT (OUTPATIENT)
Dept: REHABILITATION | Facility: HOSPITAL | Age: 82
End: 2017-05-10
Attending: INTERNAL MEDICINE
Payer: MEDICARE

## 2017-05-10 DIAGNOSIS — R29.898 LEFT LEG WEAKNESS: ICD-10-CM

## 2017-05-10 DIAGNOSIS — I63.442 EMBOLIC STROKE INVOLVING LEFT CEREBELLAR ARTERY: Primary | ICD-10-CM

## 2017-05-10 PROCEDURE — G8979 MOBILITY GOAL STATUS: HCPCS | Mod: CJ,PN

## 2017-05-10 PROCEDURE — G8978 MOBILITY CURRENT STATUS: HCPCS | Mod: CK,PN

## 2017-05-10 PROCEDURE — 97112 NEUROMUSCULAR REEDUCATION: CPT | Mod: PN

## 2017-05-10 PROCEDURE — 97110 THERAPEUTIC EXERCISES: CPT | Mod: PN

## 2017-05-10 NOTE — PROGRESS NOTES
"OUTPATIENT PHYSICAL THERAPY      Name: Roby Rodriguez  Clinic Number: [id number]    Evaluation Date: 05/10/2017  Visit #:    Precautions: fall risk, cardiac     Diagnosis:   Encounter Diagnoses   Name Primary?    Embolic stroke involving left cerebellar artery Yes    Left leg weakness      Physician: Nelida Eduardo MD    Subjective   History of Present Illness: Recent CVA with L sided weakness and decline in stability/independence with gait, mobility and standing balance   Chief complaint: decreased stability with gait and standing balance   Pain: current 0/10  Pt reports improved strength and stability. Per his daughter, he was discharged from his Neurologist and received a good report from him.   Pts goals: improve stability and independence with walking and standing    Objective     Arrived ambulating with SPC, continues with unsteady gait requiring SBA/Supv    HS length 90/90 R = 30 degrees, L = 30 degrees       TREATMENT     Discussed Plan of Care with patient: Yes    Roby received 45 minutes of therapeutic exercise for improved strength, conditioning, postural stability includin  min recumbent bike level 2-4 for increased LE conditioning  UBE level 1 resistance 2'f/2'bk x 4 min     Contract/relax HS stretch supine x3 sets Phil   Piriformis stretch   Psoas stretch supine   Standing calf stretch on board x 30"     LTR with LEs on blue s-ball x 2'  Supine hip add sqz 20 x 5" (NP)  SL clamshells OTB x 10 each   Bridges 2 x 10     Shuttle B LE 3B 3x10; L LE 2B 2x10  Lateral step ups 4" 2 x 10 L (NP)    Neuromuscular Re-education for improved balance and proprioception x 15 min   Sidesteps in // bars YTB 3 laps  Steps over obstacles in // bars       Written Home Exercises Provided: 17: added gait conditioning exercises 1-2 x per day at home.   Exercises were reviewed and Roby was able to demonstrate them prior to the end of the session. Pt received a written copy of exercises to perform " at home. Roby demonstrated good  understanding of the education provided.     Assessment    Roby is a 86 y.o. male referred to outpatient physical therapy with a medical diagnosis of Left sided weakness with gait and balance deficits due to recent CVA .    Continues with decreased L hip abd/glute strength and + trendelenburg without cane  Improving stability and step length during gait with SPC     Short Term GOALS:  -Tolerate HEP for hip flexibility, core strength and conditioning   - Moditfied indep with gait on even surfaces using SPC on R side     Long Term GOALS:  -Indep with HEP for hip and core strength, LLE strength, gait stability and standing balance (progressing)   -Improved HS length + 10 degrees Phil  for improved pelvic alignment and gait stability (progressing)   -Improved posterior hip strength Phil >4/5 for improved stability with gait and standing (progressing)   -FOTO reporting < 35% limitation with all daily mobility    Plan     Certification Period: 4/10/17  - 6/9/17     Outpatient physical therapy 1- 2 times weekly to include: pt ed, HEP, therapeutic exercises, therapeutic activities, neuromuscular re-education/ balance exercises, manual therapy, and modalities prn. Cont PT for  6-8  weeks. Pt may be seen by PTA as part of the rehabilitation team.       Rupert Francis, PT

## 2017-05-12 ENCOUNTER — CLINICAL SUPPORT (OUTPATIENT)
Dept: REHABILITATION | Facility: HOSPITAL | Age: 82
End: 2017-05-12
Attending: INTERNAL MEDICINE
Payer: MEDICARE

## 2017-05-12 DIAGNOSIS — I63.442 EMBOLIC STROKE INVOLVING LEFT CEREBELLAR ARTERY: Primary | ICD-10-CM

## 2017-05-12 PROCEDURE — 97110 THERAPEUTIC EXERCISES: CPT | Mod: PN

## 2017-05-12 PROCEDURE — 97112 NEUROMUSCULAR REEDUCATION: CPT | Mod: PN

## 2017-05-12 NOTE — PROGRESS NOTES
"OUTPATIENT PHYSICAL THERAPY      Name: Roby Rodriguez  Clinic Number: [id number]    Evaluation Date: 2017  Visit #: 10/20   Precautions: fall risk, cardiac     Diagnosis:   Encounter Diagnosis   Name Primary?    Embolic stroke involving left cerebellar artery Yes     Physician: Nelida Eduardo MD    Subjective   History of Present Illness: Recent CVA with L sided weakness and decline in stability/independence with gait, mobility and standing balance   Chief complaint: decreased stability with gait and standing balance   Pain: current 0/10  Pt reports continued improvement of LE strength and stability. Riding bike more at home.   Pts goals: improve stability and independence with walking and standing    Objective     Arrived ambulating with SPC, continues with unsteady gait requiring SBA/Supv    HS length 90/90 R = 30 degrees, L = 30 degrees       TREATMENT     Discussed Plan of Care with patient: Yes    Roby received 45 minutes of therapeutic exercise for improved strength, conditioning, postural stability includin  min recumbent bike level 2-4 for increased LE conditioning  UBE level 1.5 resistance 2'fw/2'bk     Contract/relax HS stretch supine x3 sets Phil   Piriformis stretch   Psoas stretch supine   Standing calf stretch on board x 30"     LTR with LEs on blue s-ball x 2'  Supine hip add sqz 20 x 5" (NP)  SL clamshells RTB 3 x 10 each   Bridges 2 x 10     Shuttle B LE 3B x2min; L LE 2B 2x10  Lateral step ups 4" 2 x 10 L (NP)    Neuromuscular Re-education for improved balance and proprioception x 15 min   Sidesteps in // bars YTB x 4 laps  Steps over obstacles in // bars x 4 laps      Written Home Exercises Provided: 17: added gait conditioning exercises 1-2 x per day at home.   Exercises were reviewed and Roby was able to demonstrate them prior to the end of the session. Pt received a written copy of exercises to perform at home. Roby demonstrated good  understanding of the " education provided.     Assessment    Roby is a 86 y.o. male referred to outpatient physical therapy with a medical diagnosis of Left sided weakness with gait and balance deficits due to recent CVA .    Pt tolerated treatment session well this date. He presents with mild improvement of stability with exercises in // bars however continues with weakness of L hip. Ambulates with SPC with decreased L hip abd/glute strength and + trendelenburg, worsens when ambulating without cane. He will benefit from continued hip and LE strengthening.  Short Term GOALS:  -Tolerate HEP for hip flexibility, core strength and conditioning   - Moditfied indep with gait on even surfaces using SPC on R side     Long Term GOALS:  -Indep with HEP for hip and core strength, LLE strength, gait stability and standing balance (progressing)   -Improved HS length + 10 degrees Phil  for improved pelvic alignment and gait stability (progressing)   -Improved posterior hip strength Phil >4/5 for improved stability with gait and standing (progressing)   -FOTO reporting < 35% limitation with all daily mobility    Plan     Certification Period: 4/10/17  - 6/9/17     Outpatient physical therapy 1- 2 times weekly to include: pt ed, HEP, therapeutic exercises, therapeutic activities, neuromuscular re-education/ balance exercises, manual therapy, and modalities prn. Cont PT for  6-8  weeks. Pt may be seen by PTA as part of the rehabilitation team.       Katherine Gonzales, PTA

## 2017-05-15 ENCOUNTER — CLINICAL SUPPORT (OUTPATIENT)
Dept: REHABILITATION | Facility: HOSPITAL | Age: 82
End: 2017-05-15
Attending: INTERNAL MEDICINE
Payer: MEDICARE

## 2017-05-15 DIAGNOSIS — I63.442 EMBOLIC STROKE INVOLVING LEFT CEREBELLAR ARTERY: Primary | ICD-10-CM

## 2017-05-15 DIAGNOSIS — R29.898 LEFT LEG WEAKNESS: ICD-10-CM

## 2017-05-15 PROCEDURE — G8979 MOBILITY GOAL STATUS: HCPCS | Mod: CJ,PN

## 2017-05-15 PROCEDURE — G8978 MOBILITY CURRENT STATUS: HCPCS | Mod: CJ,PN

## 2017-05-15 PROCEDURE — 97110 THERAPEUTIC EXERCISES: CPT | Mod: PN

## 2017-05-15 PROCEDURE — 97112 NEUROMUSCULAR REEDUCATION: CPT | Mod: PN

## 2017-05-15 NOTE — PROGRESS NOTES
"OUTPATIENT PHYSICAL THERAPY      Name: Roby Rodriguez  Clinic Number: [id number]    Evaluation Date: 05/15/2017  Visit #:    Precautions: fall risk, cardiac     Diagnosis:   Encounter Diagnoses   Name Primary?    Embolic stroke involving left cerebellar artery Yes    Left leg weakness      Physician: Nelida Eduardo MD    Subjective   History of Present Illness: Recent CVA with L sided weakness and decline in stability/independence with gait, mobility and standing balance   Chief complaint: decreased stability with gait and standing balance   Pain: current 0/10  Pt reports continued improvement of LE strength and stability. Riding bike more at home.   Pts goals: improve stability and independence with walking and standing    Objective     Arrived ambulating with SPC, continues with unsteady gait requiring SBA/Supv  HS length 90/90 R = 30 degrees, L = 30 degrees       TREATMENT     Discussed Plan of Care with patient: Yes    Roby received 45 minutes of therapeutic exercise for improved strength, conditioning, postural stability includin  min recumbent bike level 2-4 for increased LE conditioning  UBE level 1.5 resistance 2'fw/2'bk     Contract/relax HS stretch supine x3 sets Phil   Piriformis stretch   Psoas stretch supine   LTR with LEs on blue s-ball x 2'  S/L clamshells and hip abd with YTB L hip x 12-20   Bridges with YTB x 20      Standing calf stretch on board x 2'  Calf raises with ecc lowering 2"/4" x 10    [place] t[place] step ups 4" 2 x 10 L       Neuromuscular Re-education for improved balance and proprioception x 15 min   Sidesteps in // bars YTB x 4 laps (NP)  Hip abd walk with YTB x 4 laps in //bars   3" Steps over obstacles in // bars x 4 laps    Written Home Exercises Provided: 17: added gait conditioning exercises 1-2 x per day at home.   Exercises were reviewed and Roby was able to demonstrate them prior to the end of the session. Pt received a written copy of exercises to " perform at home. Roby demonstrated good  understanding of the education provided.     Assessment    Roby is a 86 y.o. male referred to outpatient physical therapy with a medical diagnosis of Left sided weakness with gait and balance deficits due to recent CVA .    Pt tolerated treatment session well this date. He presents with mild improvement of stability with exercises in // bars however continues with weakness of L hip. Ambulates with SPC with decreased L hip abd/glute strength and + trendelenburg, worsens when ambulating without cane. He will benefit from continued hip and LE strengthening.      Short Term GOALS:  -Tolerate HEP for hip flexibility, core strength and conditioning   - Moditfied indep with gait on even surfaces using SPC on R side     Long Term GOALS:  -Indep with HEP for hip and core strength, LLE strength, gait stability and standing balance (progressing)   -Improved HS length + 10 degrees Phil  for improved pelvic alignment and gait stability (progressing)   -Improved posterior hip strength Phil >4/5 for improved stability with gait and standing (progressing)   -FOTO reporting < 35% limitation with all daily mobility    Plan     Certification Period: 4/10/17  - 6/9/17     Outpatient physical therapy 1- 2 times weekly to include: pt ed, HEP, therapeutic exercises, therapeutic activities, neuromuscular re-education/ balance exercises, manual therapy, and modalities prn. Cont PT for  6-8  weeks. Pt may be seen by PTA as part of the rehabilitation team.       Rupert Francis, PT

## 2017-05-17 ENCOUNTER — CLINICAL SUPPORT (OUTPATIENT)
Dept: REHABILITATION | Facility: HOSPITAL | Age: 82
End: 2017-05-17
Attending: INTERNAL MEDICINE
Payer: MEDICARE

## 2017-05-17 ENCOUNTER — OFFICE VISIT (OUTPATIENT)
Dept: DERMATOLOGY | Facility: CLINIC | Age: 82
End: 2017-05-17
Payer: MEDICARE

## 2017-05-17 DIAGNOSIS — L57.0 ACTINIC KERATOSIS: ICD-10-CM

## 2017-05-17 DIAGNOSIS — I63.442 EMBOLIC STROKE INVOLVING LEFT CEREBELLAR ARTERY: Primary | ICD-10-CM

## 2017-05-17 PROCEDURE — 97112 NEUROMUSCULAR REEDUCATION: CPT | Mod: PN

## 2017-05-17 PROCEDURE — 97110 THERAPEUTIC EXERCISES: CPT | Mod: PN

## 2017-05-17 PROCEDURE — 99999 PR PBB SHADOW E&M-EST. PATIENT-LVL II: CPT | Mod: PBBFAC,,, | Performed by: DERMATOLOGY

## 2017-05-17 PROCEDURE — 17003 DESTRUCT PREMALG LES 2-14: CPT | Mod: S$PBB,,, | Performed by: DERMATOLOGY

## 2017-05-17 PROCEDURE — 17000 DESTRUCT PREMALG LESION: CPT | Mod: 78,S$PBB,, | Performed by: DERMATOLOGY

## 2017-05-17 NOTE — PROGRESS NOTES
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; with subsequent repair  Patient reports some persistent bleeding and discomfort    ROS:   Skin: also has a couple of rough spots he would like frozen    EXAM: the wound appears to be somewhat dehisced, with some yellowish dried exudate along the incision line; minimal erythema to surrounding skin  There are several rough, hyperkeratotic, irregularly-shaped, irregularly-surfaced, yellowish papules and/or plaques with mild surrounding erythema but no notable underlying induration, which are clinically typical in appearance for actinic keratoses on the left forehead and right temple    IMPRESSION: possible superficial wound infection to site of recent surgery  actinic keratoses to sites noted    PLAN:  Discussed current findings  Swab taken for culture and sensitivity at left temple site  Will followup per culture results  See procedure note below    PROCEDURE NOTE - DESTRUCTION OF LESION(S) BY LIQUID NITROGEN  Diagnosis: actinic keratosis/keratoses  Discussed diagnosis, options, risks benefits and alternatives. verbal consent obtained.  Spray application of liquid nitrogen was carried out to lesion(s) for destruction  Site(s)/number(s) treated: left forehead/1; right temple/2  Total number of lesions treated:  3  Anticipated course and routine care instructions reviewed  Followup 2 weeks

## 2017-05-17 NOTE — PROGRESS NOTES
"OUTPATIENT PHYSICAL THERAPY      Name: Roby Rodriguez  Clinic Number: [id number]    Evaluation Date: 2017  Visit #:    Precautions: fall risk, cardiac     Diagnosis:   Encounter Diagnosis   Name Primary?    Embolic stroke involving left cerebellar artery Yes     Physician: Nelida Eduardo MD    Subjective   History of Present Illness: Recent CVA with L sided weakness and decline in stability/independence with gait, mobility and standing balance   Chief complaint: decreased stability with gait and standing balance   Pain: current 0/10  Pt reports continued improvement of LE strength and stability. Keeping up with exercises at home, but feels worn out today.   Pts goals: improve stability and independence with walking and standing    Objective     Arrived ambulating with SPC, continues with unsteady gait requiring SBA/Supv  HS length 90/90 R = 30 degrees, L = 30 degrees       TREATMENT     Discussed Plan of Care with patient: Yes    Roby received 45 minutes of therapeutic exercise for improved strength, conditioning, postural stability includin  min recumbent bike level 2-4 for increased LE conditioning (NP)  UBE level 1.5 resistance 3'fw/3'bk (towel roll along spine at mid-low back for posture)    Contract/relax HS stretch supine x3 sets Phil   Piriformis stretch   Psoas stretch supine   LTR with LEs on blue s-ball x 2'  S/L clamshells and hip abd with YTB L hip 2x10   Bridges with YTB 2x10      Standing calf stretch on board x 2'  Calf raises with ecc lowering 2"/4" x 10   Lateral step ups 4" 2 x 10 L     Neuromuscular Re-education for improved balance and proprioception x 15 min   Sidesteps in // bars YTB x 4 laps  3" Steps over obstacles in // bars x 4 laps    Written Home Exercises Provided: 17: added gait conditioning exercises 1-2 x per day at home.   Exercises were reviewed and Roby was able to demonstrate them prior to the end of the session. Pt received a written copy of " exercises to perform at home. Roby demonstrated good  understanding of the education provided.     Assessment    Roby is a 86 y.o. male referred to outpatient physical therapy with a medical diagnosis of Left sided weakness with gait and balance deficits due to recent CVA .    Pt tolerated treatment session well this date however appeared slightly more lethargic as pt states he feels tired today. He continues with LE weakness, L > R, and requires use of UEs for stability with activities in // bars. Continued gait with decreased L hip abd/glute strength and + trendelenburg using SPC on R. Pt will continues to benefit from progression of hip and LE strengthening.    Short Term GOALS:  -Tolerate HEP for hip flexibility, core strength and conditioning   - Moditfied indep with gait on even surfaces using SPC on R side     Long Term GOALS:  -Indep with HEP for hip and core strength, LLE strength, gait stability and standing balance (progressing)   -Improved HS length + 10 degrees Phil  for improved pelvic alignment and gait stability (progressing)   -Improved posterior hip strength Phil >4/5 for improved stability with gait and standing (progressing)   -FOTO reporting < 35% limitation with all daily mobility    Plan     Certification Period: 4/10/17  - 6/9/17     Outpatient physical therapy 1- 2 times weekly to include: pt ed, HEP, therapeutic exercises, therapeutic activities, neuromuscular re-education/ balance exercises, manual therapy, and modalities prn. Cont PT for  6-8  weeks. Pt may be seen by PTA as part of the rehabilitation team.       Katherine Gonzales PTA

## 2017-05-19 ENCOUNTER — CLINICAL SUPPORT (OUTPATIENT)
Dept: AUDIOLOGY | Facility: CLINIC | Age: 82
End: 2017-05-19
Payer: MEDICARE

## 2017-05-19 ENCOUNTER — OFFICE VISIT (OUTPATIENT)
Dept: OTOLARYNGOLOGY | Facility: CLINIC | Age: 82
End: 2017-05-19
Payer: MEDICARE

## 2017-05-19 ENCOUNTER — TELEPHONE (OUTPATIENT)
Dept: DERMATOLOGY | Facility: CLINIC | Age: 82
End: 2017-05-19

## 2017-05-19 VITALS — WEIGHT: 191.13 LBS | BODY MASS INDEX: 25.33 KG/M2 | HEIGHT: 73 IN

## 2017-05-19 DIAGNOSIS — H90.A31 MIXED CONDUCTIVE AND SENSORINEURAL HEARING LOSS OF RIGHT EAR WITH RESTRICTED HEARING OF LEFT EAR: Primary | ICD-10-CM

## 2017-05-19 DIAGNOSIS — J30.1 ALLERGIC RHINITIS DUE TO POLLEN, UNSPECIFIED RHINITIS SEASONALITY: Primary | ICD-10-CM

## 2017-05-19 PROCEDURE — 99999 PR PBB SHADOW E&M-EST. PATIENT-LVL III: CPT | Mod: PBBFAC,,, | Performed by: OTOLARYNGOLOGY

## 2017-05-19 PROCEDURE — 99213 OFFICE O/P EST LOW 20 MIN: CPT | Mod: PBBFAC | Performed by: OTOLARYNGOLOGY

## 2017-05-19 PROCEDURE — 99204 OFFICE O/P NEW MOD 45 MIN: CPT | Mod: S$PBB,,, | Performed by: OTOLARYNGOLOGY

## 2017-05-19 RX ORDER — MINOCYCLINE HYDROCHLORIDE 100 MG/1
CAPSULE ORAL
Qty: 20 CAPSULE | Refills: 0 | Status: SHIPPED | OUTPATIENT
Start: 2017-05-19 | End: 2017-11-08

## 2017-05-19 RX ORDER — AZELASTINE 1 MG/ML
1 SPRAY, METERED NASAL 2 TIMES DAILY
Qty: 30 ML | Refills: 3 | Status: SHIPPED | OUTPATIENT
Start: 2017-05-19 | End: 2019-05-28

## 2017-05-19 NOTE — PROGRESS NOTES
Subjective:       Patient ID: Roby Rodriguez is a 86 y.o. male.    Chief Complaint: Hearing Loss    Hearing Loss:   Chronicity:  Chronic  Onset:  More than 1 year ago  Progression since onset:  Unchanged  Frequency:  Constantly  Severity:  Severe  Duration:  Off/on all day  Hearing loss characteristics:  Stable, severe, muffled, trouble hearing TV, difficult on telephone and worse background noiseNo dizziness, no ear pain, no fever, no headaches, no tinnitus and no rhinorrhea.  Aggravated by:  Noise  Risk factors for lung disease:  Noise exposure  Treatments tried:  Hearing aids  Improvement on treatment:  Moderate   PMH includes: stroke, TIA, or systemic emboli and environmental allergies.  No dizziness.    AR sx's with rhinorrhea/ clear.    Past Medical History: Patient has a past medical history of *Atrial fibrillation; Allergy; Arthritis; Atrial fibrillation; Basal cell carcinoma (10/12/10); Basal cell carcinoma (7/23/2014); Cancer; Conductive hearing loss, bilateral; Frequency of urination; Gastroenteritis; Headache; History of colonoscopy; Hypertension; Kidney stones; Otitis media; SQUAMOUS CELL CARCINOMA (7/27/07); and Supraventricular tachycardia.    Past Surgical History: Patient has a past surgical history that includes Resection of Skin Cancer Scalp; Duputyren Contracture Right Hand; Tonsillectomy; Adenoidectomy; Colonoscopy; Cardiac pacemaker implantation; and Insert / replace / remove pacemaker.    Social History: Patient reports that he has never smoked. He has never used smokeless tobacco. He reports that he drinks about 3.5 oz of alcohol per week  He reports that he does not use illicit drugs.    Family History: family history is negative for Melanoma, Psoriasis, Lupus, and Eczema.    Medications:   Current Outpatient Prescriptions   Medication Sig    antipyrine-benzocaine (AURALGAN OR EQUIV) 5.4-1.4 % Drop PLACE 3 DROPS INTO BOTH EARS 4 (FOUR) TIMES DAILY.    atorvastatin (LIPITOR) 40 MG tablet  Take 1 tablet (40 mg total) by mouth once daily.    guanfacine (TENEX) 1 MG Tab TAKE 1 TABLET EVERY EVENING    lisinopril (PRINIVIL,ZESTRIL) 20 MG tablet Take 1 tablet (20 mg total) by mouth 2 (two) times daily.    metoprolol succinate (TOPROL-XL) 50 MG 24 hr tablet TAKE 1 TABLET ONCE DAILY    metoprolol succinate (TOPROL-XL) 50 MG 24 hr tablet TAKE 1 TABLET ONCE DAILY    rivaroxaban (XARELTO) 20 mg Tab Take 1 tablet (20 mg total) by mouth once daily.    azelastine (ASTELIN) 137 mcg (0.1 %) nasal spray 1 spray (137 mcg total) by Nasal route 2 (two) times daily.     No current facility-administered medications for this visit.        Allergies: Patient is allergic to amlodipine and dyazide [triamterene-hydrochlorothiazid].      Review of Systems   Constitutional: Negative for activity change, appetite change, chills, diaphoresis, fatigue, fever and unexpected weight change.   HENT: Positive for hearing loss and postnasal drip. Negative for congestion, ear discharge, ear pain, facial swelling, nosebleeds, rhinorrhea, sinus pressure, sneezing, sore throat, tinnitus, trouble swallowing and voice change.    Eyes: Negative for photophobia, pain, discharge, redness and visual disturbance.   Respiratory: Negative for cough, chest tightness, shortness of breath and wheezing.    Cardiovascular: Negative for chest pain and palpitations.   Gastrointestinal: Negative for abdominal pain, constipation, diarrhea and nausea.   Genitourinary: Negative for dysuria and frequency.   Musculoskeletal: Negative for arthralgias, back pain, gait problem, joint swelling, myalgias, neck pain and neck stiffness.   Skin: Negative for color change, pallor and rash.   Allergic/Immunologic: Positive for environmental allergies.   Neurological: Negative for dizziness, tremors, seizures, syncope, facial asymmetry, speech difficulty, weakness, light-headedness, numbness and headaches.   Hematological: Negative for adenopathy. Does not  bruise/bleed easily.   Psychiatric/Behavioral: Negative for agitation, confusion, decreased concentration, dysphoric mood and sleep disturbance. The patient is not nervous/anxious and is not hyperactive.        Objective:      Physical Exam   Constitutional: He is oriented to person, place, and time. He appears well-developed and well-nourished. He is cooperative.  Non-toxic appearance. He does not have a sickly appearance. He does not appear ill. No distress.   HENT:   Head: Normocephalic and atraumatic. Not macrocephalic and not microcephalic. Head is without raccoon's eyes, without Marx's sign, without abrasion, without contusion, without laceration, without right periorbital erythema and without left periorbital erythema. Hair is normal.   Right Ear: Ear canal normal. No lacerations. No drainage, swelling or tenderness. No foreign bodies. No mastoid tenderness. Tympanic membrane is scarred and perforated. Tympanic membrane is not injected, not erythematous, not retracted and not bulging. Tympanic membrane mobility is normal. No middle ear effusion. No hemotympanum. Decreased hearing is noted.   Left Ear: Ear canal normal. No lacerations. No drainage, swelling or tenderness. No foreign bodies. No mastoid tenderness. Tympanic membrane is scarred. Tympanic membrane is not injected, not perforated, not erythematous, not retracted and not bulging. Tympanic membrane mobility is normal.  No middle ear effusion. No hemotympanum. Decreased hearing is noted.   Ears:    Nose: Mucosal edema and rhinorrhea present. No nose lacerations, sinus tenderness, nasal deformity, septal deviation or nasal septal hematoma. No epistaxis.  No foreign bodies. Right sinus exhibits no maxillary sinus tenderness. Left sinus exhibits no maxillary sinus tenderness.       TM's sclerotic AU.    No fluid    ? Micro perf AD.       Eyes: Conjunctivae, EOM and lids are normal. Pupils are equal, round, and reactive to light.   Neck: Normal range of  motion. Neck supple. No JVD present. No tracheal tenderness and no muscular tenderness present. No rigidity. No tracheal deviation, no edema, no erythema and normal range of motion present. No thyroid mass and no thyromegaly present.   Cardiovascular: Normal rate and regular rhythm.    Pulmonary/Chest: Effort normal. No accessory muscle usage or stridor. No apnea, no tachypnea and no bradypnea. No respiratory distress.   Abdominal: Soft. Normal appearance.   Musculoskeletal: Normal range of motion.   Lymphadenopathy:        Head (right side): No submental, no submandibular, no tonsillar, no preauricular and no posterior auricular adenopathy present.        Head (left side): No submental, no submandibular, no tonsillar, no preauricular and no posterior auricular adenopathy present.     He has no cervical adenopathy.        Right cervical: No superficial cervical, no deep cervical and no posterior cervical adenopathy present.       Left cervical: No superficial cervical, no deep cervical and no posterior cervical adenopathy present.   Neurological: He is alert and oriented to person, place, and time. He displays no atrophy and no tremor. No cranial nerve deficit or sensory deficit. He exhibits normal muscle tone. He displays no seizure activity.   Skin: Skin is warm, dry and intact. No abrasion, no bruising, no burn, no ecchymosis, no laceration, no lesion and no rash noted. He is not diaphoretic. No erythema. No pallor.   Psychiatric: He has a normal mood and affect. His behavior is normal. Judgment and thought content normal. His speech is not rapid and/or pressured and not slurred. Cognition and memory are normal. He is communicative.   Nursing note and vitals reviewed.              Assessment:       1. Allergic rhinitis due to pollen, unspecified rhinitis seasonality    2. Asymmetrical hearing loss, bilateral        Plan:         Roby was seen today for hearing loss.    Diagnoses and all orders for this  visit:    Allergic rhinitis due to pollen, unspecified rhinitis seasonality  -     Ambulatory referral to Audiology    Asymmetrical hearing loss, bilateral  -     Ambulatory referral to Audiology    Other orders  -     azelastine (ASTELIN) 137 mcg (0.1 %) nasal spray; 1 spray (137 mcg total) by Nasal route 2 (two) times daily.        Con't aids.    F/U prn

## 2017-05-19 NOTE — PROGRESS NOTES
Mr. Rodriguez was seen in the clinic today for a hearing evaluation.     Audiological testing revealed a moderately severe to profound SNHL in the left ear and a severe to profound mixed hearing loss in the right ear. A speech reception threshold was obtained at 55 dBHL in the left ear and 80 dBHL in the right ear. Speech discrimination was 52% in the left ear and 76% in the right ear.    Tympanometry revealed Type A tympanogram in the left ear and a Type B, High Canal Volume tympanogram in the right ear.    Recommendations:  1. Otologic evaluation  2. Annual hearing evaluation  3. Noise protection  4. Continued use of hearing aids

## 2017-05-20 LAB — BACTERIA SPEC AEROBE CULT: NORMAL

## 2017-05-22 ENCOUNTER — CLINICAL SUPPORT (OUTPATIENT)
Dept: REHABILITATION | Facility: HOSPITAL | Age: 82
End: 2017-05-22
Attending: INTERNAL MEDICINE
Payer: MEDICARE

## 2017-05-22 DIAGNOSIS — I63.442 EMBOLIC STROKE INVOLVING LEFT CEREBELLAR ARTERY: Primary | ICD-10-CM

## 2017-05-22 PROCEDURE — 97112 NEUROMUSCULAR REEDUCATION: CPT | Mod: PN

## 2017-05-22 PROCEDURE — 97110 THERAPEUTIC EXERCISES: CPT | Mod: PN

## 2017-05-22 NOTE — PROGRESS NOTES
"OUTPATIENT PHYSICAL THERAPY      Name: Roby Rodriguez  Clinic Number: [id number]    Evaluation Date: 2017  Visit #:    Precautions: fall risk, cardiac     Diagnosis:   Encounter Diagnosis   Name Primary?    Embolic stroke involving left cerebellar artery Yes     Physician: Nelida Eduardo MD    Subjective   History of Present Illness: Recent CVA with L sided weakness and decline in stability/independence with gait, mobility and standing balance   Chief complaint: decreased stability with gait and standing balance   Pain: current 0/10  Pt reports continued improvement of LE strength and stability. Keeping up with exercises at home, but feels worn out today.   Pts goals: improve stability and independence with walking and standing    Objective     Arrived ambulating with SPC, continues with unsteady gait requiring SBA/Supv  HS length 90/90 R = 30 degrees, L = 30 degrees       TREATMENT     Discussed Plan of Care with patient: Yes    Roby received 45 minutes of therapeutic exercise for improved strength, conditioning, postural stability includin  min recumbent bike level 2-4 for increased LE conditioning   UBE level 1.5 resistance 3'fw/3'bk (towel roll along spine at mid-low back for posture)    Contract/relax HS stretch supine x3 sets Phil   Piriformis stretch   Psoas stretch supine   LTR with LEs on blue s-ball x 2' (NP)  S/L clamshells and hip abd with RTB L hip 2x10   Bridges with RTB 2x10      Standing calf stretch on board x 2'  Calf raises with ecc lowering 2"/4" x 15   Lateral step ups 6" 2 x 10 L     Neuromuscular Re-education for improved balance and proprioception x 15 min   Sidesteps in // bars YTB x 4 laps  3" Steps over obstacles in // bars x 4 laps    Written Home Exercises Provided: 17: added gait conditioning exercises 1-2 x per day at home.   Exercises were reviewed and Roby was able to demonstrate them prior to the end of the session. Pt received a written copy of " exercises to perform at home. Roby demonstrated good  understanding of the education provided.     Assessment    Roby is a 86 y.o. male referred to outpatient physical therapy with a medical diagnosis of Left sided weakness with gait and balance deficits due to recent CVA .    Patient continues to tolerate treatment well without report of increased soreness or fatigue. He continues with LE weakness, L > R, as well as balance deficits, static and dynamic. Pt requires use of UEs for stability with activities in // bars. Pt will continues to benefit from progression of hip and LE strengthening.    Short Term GOALS:  -Tolerate HEP for hip flexibility, core strength and conditioning   - Moditfied indep with gait on even surfaces using SPC on R side     Long Term GOALS:  -Indep with HEP for hip and core strength, LLE strength, gait stability and standing balance (progressing)   -Improved HS length + 10 degrees Phil  for improved pelvic alignment and gait stability (progressing)   -Improved posterior hip strength Phil >4/5 for improved stability with gait and standing (progressing)   -FOTO reporting < 35% limitation with all daily mobility    Plan     Certification Period: 4/10/17  - 6/9/17     Outpatient physical therapy 1- 2 times weekly to include: pt ed, HEP, therapeutic exercises, therapeutic activities, neuromuscular re-education/ balance exercises, manual therapy, and modalities prn. Cont PT for  6-8  weeks. Pt may be seen by PTA as part of the rehabilitation team.       Katherine Gonzales, PTA

## 2017-05-24 ENCOUNTER — CLINICAL SUPPORT (OUTPATIENT)
Dept: REHABILITATION | Facility: HOSPITAL | Age: 82
End: 2017-05-24
Attending: INTERNAL MEDICINE
Payer: MEDICARE

## 2017-05-24 DIAGNOSIS — I63.442 EMBOLIC STROKE INVOLVING LEFT CEREBELLAR ARTERY: Primary | ICD-10-CM

## 2017-05-24 DIAGNOSIS — R29.898 LEFT LEG WEAKNESS: ICD-10-CM

## 2017-05-24 PROCEDURE — 97110 THERAPEUTIC EXERCISES: CPT | Mod: PN

## 2017-05-24 PROCEDURE — 97112 NEUROMUSCULAR REEDUCATION: CPT | Mod: PN

## 2017-05-24 NOTE — PROGRESS NOTES
"OUTPATIENT PHYSICAL THERAPY      Name: Roby Rodriguez  Clinic Number: [id number]    Evaluation Date: 2017  Visit #:    Precautions: fall risk, cardiac       Physician: Nelida Eduardo MD    Subjective   History of Present Illness: Recent CVA with L sided weakness and decline in stability/independence with gait, mobility and standing balance   Chief complaint: decreased stability with gait and standing balance   Pain: current 0/10  Pt reports continued improvement of LE strength and stability. Keeping up with exercises at home, but feels worn out today.   Pts goals: improve stability and independence with walking and standing    Pt dtr reports he is getting more steady, still off balance with certain activities,       Objective     +trendelenburg sign L hip during gait   + decreased coordination LLE: heel taps/toe taps      TREATMENT     Discussed Plan of Care with patient: Yes    Roby received 45 minutes of therapeutic exercise for improved strength, conditioning, postural stability includin  min recumbent bike level 2-4 for increased LE conditioning   UBE level 1.5 resistance 3'fw/3'bk (towel roll along spine at mid-low back for posture)    Active HS stretch 3' x 10 Phil   PNF LLE slow reversals x 10 all planes     LTR with LEs on blue s-ball x 2'    HEP  S/L clamshells and hip abd with RTB L hip 2x10   Bridges with RTB 2x10      Standing calf stretch on board x 2'  Calf raises with ecc lowering 2"/4" x 15   Lateral step ups 6" 2 x 10 L     Neuromuscular Re-education for improved balance and proprioception x 15 min   Sidesteps in // bars YTB x 4 laps  3" Steps over obstacles in // bars x 4 laps    Written Home Exercises Provided: 17: added gait conditioning exercises 1-2 x per day at home.   Exercises were reviewed and Roby was able to demonstrate them prior to the end of the session. Pt received a written copy of exercises to perform at home. Roby demonstrated good  " understanding of the education provided.     Assessment    Roby is a 86 y.o. male referred to outpatient physical therapy with a medical diagnosis of Left sided weakness with gait and balance deficits due to recent CVA .    Patient continues to tolerate treatment well without report of increased soreness or fatigue. He continues with LE weakness, L > R, as well as balance deficits, static and dynamic. Pt requires use of UEs for stability with activities in // bars. Pt will continues to benefit from progression of hip and LE strengthening.    Short Term GOALS:  -Tolerate HEP for hip flexibility, core strength and conditioning   - Moditfied indep with gait on even surfaces using SPC on R side     Long Term GOALS:  -Indep with HEP for hip and core strength, LLE strength, gait stability and standing balance (progressing)   -Improved HS length + 10 degrees Phil  for improved pelvic alignment and gait stability (progressing)   -Improved posterior hip strength Phil >4/5 for improved stability with gait and standing (progressing)   -FOTO reporting < 35% limitation with all daily mobility    Plan     Certification Period: 4/10/17  - 6/9/17     Outpatient physical therapy 1- 2 times weekly to include: pt ed, HEP, therapeutic exercises, therapeutic activities, neuromuscular re-education/ balance exercises, manual therapy, and modalities prn. Cont PT for  6-8  weeks. Pt may be seen by PTA as part of the rehabilitation team.       Rupert Francis, PT

## 2017-05-26 ENCOUNTER — HOME CARE VISIT (OUTPATIENT)
Dept: NEUROLOGY | Facility: HOSPITAL | Age: 82
End: 2017-05-26

## 2017-05-30 NOTE — PROGRESS NOTES
"OUTPATIENT PHYSICAL THERAPY      Name: Roby Rodriguez  Clinic Number: [id number]    Evaluation Date: 2017  Visit #: 15/20   Precautions: fall risk, cardiac       Physician: Nelida Eduardo MD    Subjective   History of Present Illness: Recent CVA with L sided weakness and decline in stability/independence with gait, mobility and standing balance   Chief complaint: decreased stability with gait and standing balance   Pain: current 0/10  Pt reports continued improvement of LE strength and stability. Keeping up with exercises at home, but feels worn out today.   Pts goals: improve stability and independence with walking and standing    2017  No new complaints, continued weakness with LLE, working on       Objective     + trendelenburg sign L hip during gait   + decreased coordination LLE: heel taps/toe taps    Functional Gait Assessment:   1. Gait on level surface = 2  2. Change in Gait Speed = 2  3. Gait with horizontal head turns  = 1  4. Gait with vertical head turns = 1  5. Gait with pivot turns = 2  6. Step over obstacle = 2  7. Gait with Narrow PIPPA = 0  8. Gait with eyes closed = 1  9. Ambulating Backwards = 1  10. Steps = 1    Score    \  TREATMENT     Roby received 30 minutes of therapeutic exercise for improved strength, conditioning, postural stability includin  min recumbent bike level 2-4 for increased LE conditioning   UBE level 1.5 resistance 3'fw/3'bk (towel roll along spine at mid-low back for posture)    Active HS stretch 3' x 10 Phil   PNF LLE slow reversals x 10 all planes     LTR with LEs on blue s-ball x 2'    HEP  S/L clamshells and hip abd with RTB L hip 2x10   Bridges with RTB 2x10      Standing calf stretch on board x 2'  Calf raises with ecc lowering 2"/4" x 15   Lateral step ups 6" 2 x 10 L   Squats with YTB hip abd x 20     Neuromuscular Re-education for improved balance and proprioception x 30  min   Sidesteps in // bars YTB x 4 laps  Side steps over " obstacles in // bars x 4 laps  1/4 turns in // bars x 10     Written Home Exercises Provided:    -gait conditioning exercises 1-2 x per day at home.  Pt received a written copy of exercises to perform at home.   -1/4 turns to the L at kitchen ctr x 10     Assessment    Roby is a 86 y.o. male referred to outpatient physical therapy with a medical diagnosis of Left sided weakness with gait and balance deficits due to recent CVA .    Continues with gait and balance deficits related to decreased strength and sensory loss of LLE    Short Term GOALS: ~MET   -Tolerate HEP for hip flexibility, core strength and conditioning   - Moditfied indep with gait on even surfaces using SPC on R side     Long Term GOALS:  -Indep with HEP for hip and core strength, LLE strength, gait stability and standing balance (progressing)   -Improved HS length + 10 degrees Phil  for improved pelvic alignment and gait stability (progressing)   -Improved posterior hip strength Phil >4/5 for improved stability with gait and standing (progressing)   -FOTO reporting < 35% limitation with all daily mobility    -Functional Gait Assessment + 3-5 points for decreased fall risk       Plan     Certification Period: 4/10/17  - 6/9/17     Outpatient physical therapy 1- 2 times weekly to include: pt ed, HEP, therapeutic exercises, therapeutic activities, neuromuscular re-education/ balance exercises, manual therapy, and modalities prn. Cont PT for  6-8  weeks. Pt may be seen by PTA as part of the rehabilitation team.       Rupert Francis, PT

## 2017-05-31 ENCOUNTER — CLINICAL SUPPORT (OUTPATIENT)
Dept: REHABILITATION | Facility: HOSPITAL | Age: 82
End: 2017-05-31
Attending: INTERNAL MEDICINE
Payer: MEDICARE

## 2017-05-31 ENCOUNTER — OFFICE VISIT (OUTPATIENT)
Dept: DERMATOLOGY | Facility: CLINIC | Age: 82
End: 2017-05-31
Payer: MEDICARE

## 2017-05-31 DIAGNOSIS — R29.898 LEFT LEG WEAKNESS: ICD-10-CM

## 2017-05-31 DIAGNOSIS — Z85.828 HISTORY OF MOH'S MICROGRAPHIC SURGERY FOR SKIN CANCER: Primary | ICD-10-CM

## 2017-05-31 DIAGNOSIS — Z98.890 HISTORY OF MOH'S MICROGRAPHIC SURGERY FOR SKIN CANCER: Primary | ICD-10-CM

## 2017-05-31 DIAGNOSIS — I63.442 EMBOLIC STROKE INVOLVING LEFT CEREBELLAR ARTERY: Primary | ICD-10-CM

## 2017-05-31 PROCEDURE — 99999 PR PBB SHADOW E&M-EST. PATIENT-LVL I: CPT | Mod: PBBFAC,,, | Performed by: DERMATOLOGY

## 2017-05-31 PROCEDURE — 97110 THERAPEUTIC EXERCISES: CPT | Mod: PN

## 2017-05-31 PROCEDURE — 97112 NEUROMUSCULAR REEDUCATION: CPT | Mod: PN

## 2017-05-31 PROCEDURE — 99024 POSTOP FOLLOW-UP VISIT: CPT | Mod: ,,, | Performed by: DERMATOLOGY

## 2017-05-31 NOTE — PROGRESS NOTES
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; with subsequent repair, 2 months ago  Timin weeks ago he was seen with some exudate to the site  Culture showed MRSA, sensitive to tetracycline  He was called in minocycline which he just finished  Quality:better; still gets some blood on the pillow     Current Outpatient Prescriptions:     antipyrine-benzocaine (AURALGAN OR EQUIV) 5.4-1.4 % Drop, PLACE 3 DROPS INTO BOTH EARS 4 (FOUR) TIMES DAILY., Disp: 42 mL, Rfl: 4    atorvastatin (LIPITOR) 40 MG tablet, Take 1 tablet (40 mg total) by mouth once daily., Disp: 90 tablet, Rfl: 3    azelastine (ASTELIN) 137 mcg (0.1 %) nasal spray, 1 spray (137 mcg total) by Nasal route 2 (two) times daily., Disp: 30 mL, Rfl: 3    guanfacine (TENEX) 1 MG Tab, TAKE 1 TABLET EVERY EVENING, Disp: 90 tablet, Rfl: 0    lisinopril (PRINIVIL,ZESTRIL) 20 MG tablet, Take 1 tablet (20 mg total) by mouth 2 (two) times daily., Disp: 180 tablet, Rfl: 3    metoprolol succinate (TOPROL-XL) 50 MG 24 hr tablet, TAKE 1 TABLET ONCE DAILY, Disp: 90 tablet, Rfl: 0    metoprolol succinate (TOPROL-XL) 50 MG 24 hr tablet, TAKE 1 TABLET ONCE DAILY, Disp: 90 tablet, Rfl: 3    minocycline (MINOCIN,DYNACIN) 100 MG capsule, Take one by mouth bid, Disp: 20 capsule, Rfl: 0    rivaroxaban (XARELTO) 20 mg Tab, Take 1 tablet (20 mg total) by mouth once daily., Disp: 90 tablet, Rfl: 3     EXAM: the site on the left temple is almost completely healed, although there is still a small open area to the posterior end of the scar    IMPRESSION: status post Mohs' micrographic surgery; and postop wound infection  Small residual draining focus; possibly a vicryl suture site    PLAN:  Discussed current findings and anticipated course  Expect complete healing in the next 1-2 weeks  Followup to  Dr. Vitale in 3-4 months; PRN to me

## 2017-06-07 ENCOUNTER — CLINICAL SUPPORT (OUTPATIENT)
Dept: REHABILITATION | Facility: HOSPITAL | Age: 82
End: 2017-06-07
Attending: INTERNAL MEDICINE
Payer: MEDICARE

## 2017-06-07 DIAGNOSIS — I63.442 EMBOLIC STROKE INVOLVING LEFT CEREBELLAR ARTERY: ICD-10-CM

## 2017-06-07 DIAGNOSIS — R29.898 LEFT LEG WEAKNESS: ICD-10-CM

## 2017-06-07 PROCEDURE — 97110 THERAPEUTIC EXERCISES: CPT | Mod: PN

## 2017-06-07 PROCEDURE — 97112 NEUROMUSCULAR REEDUCATION: CPT | Mod: PN

## 2017-06-07 NOTE — PROGRESS NOTES
"OUTPATIENT PHYSICAL THERAPY      Name: Roby Rodrigeuz  Clinic Number: [id number]    Evaluation Date: 2017  Visit #:    Precautions: fall risk, cardiac       Physician: Nelida Eduardo MD    Subjective   History of Present Illness: Recent CVA with L sided weakness and decline in stability/independence with gait, mobility and standing balance   Chief complaint: decreased stability with gait and standing balance   Pain: current 0/10  Pt reports continued improvement of LE strength and stability. Keeping up with exercises at home, but feels worn out today.   Pts goals: improve stability and independence with walking and standing    2017  No new complaints, continued weakness with LLE, working on       Objective     + trendelenburg sign L hip during gait   + decreased coordination LLE: heel taps/toe taps    Functional Gait Assessment:   1. Gait on level surface = 2  2. Change in Gait Speed = 2  3. Gait with horizontal head turns  = 1  4. Gait with vertical head turns = 1  5. Gait with pivot turns = 2  6. Step over obstacle = 2  7. Gait with Narrow PIPPA = 0  8. Gait with eyes closed = 1  9. Ambulating Backwards = 1  10. Steps = 1    Score    \  TREATMENT     Roby received 30 minutes of therapeutic exercise for improved strength, conditioning, postural stability includin  min recumbent bike level 2-4 for increased LE conditioning   UBE level 1.5 resistance 3'fw/3'bk (towel roll along spine at mid-low back for posture)    Active HS stretch 3' x 10 Phil   PNF LLE slow reversals x 10 all planes     LTR with LEs on blue s-ball x 2'    HEP  S/L clamshells and hip abd with RTB L hip 2x10   Bridges with RTB 2x10      Standing calf stretch on board x 2'  Calf raises with ecc lowering 2"/4" x 15   Lateral step ups 6" 2 x 10 L   Squats with YTB hip abd x 20     Neuromuscular Re-education for improved balance and proprioception x 30  min   3 sec march with green sport cord (post pelvis)   Single " side step and hold with green sport cord  (lateral pelvis)       Written Home Exercises Provided:    -gait conditioning exercises 1-2 x per day at home.  Pt received a written copy of exercises to perform at home.   -1/4 turns to the L at kitchen ctr x 10     Assessment    Roby is a 86 y.o. male referred to outpatient physical therapy with a medical diagnosis of Left sided weakness with gait and balance deficits due to recent CVA .    Improving stability and coordination of LLE, but requires UE support from cane during dynamic gait and balance activity     Short Term GOALS: ~MET   -Tolerate HEP for hip flexibility, core strength and conditioning (MET)   - Moditfied indep with gait on even surfaces using SPC on R side (MET)    Long Term GOALS:  -Indep with HEP for hip and core strength, LLE strength, gait stability and standing balance (progressing)   -Improved HS length + 10 degrees Phil  for improved pelvic alignment and gait stability (progressing)   -Improved posterior hip strength L (-) trendelenburg sign on L with unsupported gait.   -FOTO reporting < 35% limitation with all daily mobility  -Functional Gait Assessment + 3-5 points for decreased fall risk       Plan     Certification Period: 6/9/17 - 7/21/17     Extend PT Plan of care 1 x per week for 4-6 weeks progressing towards current and updated goals.       Rupert Francis, PT

## 2017-06-14 ENCOUNTER — CLINICAL SUPPORT (OUTPATIENT)
Dept: REHABILITATION | Facility: HOSPITAL | Age: 82
End: 2017-06-14
Attending: INTERNAL MEDICINE
Payer: MEDICARE

## 2017-06-14 DIAGNOSIS — I63.442 EMBOLIC STROKE INVOLVING LEFT CEREBELLAR ARTERY: Primary | ICD-10-CM

## 2017-06-14 DIAGNOSIS — R29.898 LEFT LEG WEAKNESS: ICD-10-CM

## 2017-06-14 PROCEDURE — 97112 NEUROMUSCULAR REEDUCATION: CPT | Mod: PN

## 2017-06-14 PROCEDURE — 97110 THERAPEUTIC EXERCISES: CPT | Mod: PN

## 2017-06-14 NOTE — PROGRESS NOTES
"OUTPATIENT PHYSICAL THERAPY      Name: Roby Rodriguez  Clinic Number: [id number]    Evaluation Date: 2017  Visit #:    Precautions: fall risk, cardiac       Physician: Nelida Eduardo MD    Subjective   History of Present Illness: Recent CVA with L sided weakness and decline in stability/independence with gait, mobility and standing balance   Chief complaint: decreased stability with gait and standing balance   Pain: current 0/10  Pt reports continued improvement of LE strength and stability. Keeping up with exercises at home, but feels worn out today.   Pts goals: improve stability and independence with walking and standing    2017  No new complaints, continued weakness with LLE, working on       Objective     + trendelenburg sign L hip during gait   + decreased coordination LLE: heel taps/toe taps    Functional Gait Assessment: (prior)   1. Gait on level surface = 2  2. Change in Gait Speed = 2  3. Gait with horizontal head turns  = 1  4. Gait with vertical head turns = 1  5. Gait with pivot turns = 2  6. Step over obstacle = 2  7. Gait with Narrow PIPPA = 0  8. Gait with eyes closed = 1  9. Ambulating Backwards = 1  10. Steps = 1    Score    \  TREATMENT     Roby received 30 minutes of therapeutic exercise for improved strength, conditioning, postural stability includin  min recumbent bike level 2-4 for increased LE conditioning   UBE level 1.5 resistance 3'fw/3'bk (towel roll along spine at mid-low back for posture)    Active HS stretch 3' x 10 Phil   PNF LLE slow reversals x 10 all planes     LTR with LEs on blue s-ball x 2'    HEP  S/L clamshells and hip abd with RTB L hip 2x10   Bridges with RTB 2x10      Standing calf stretch on board x 2'  Calf raises with ecc lowering 2"/4" x 15   Lateral step ups 6" 2 x 10 L   Squats with YTB hip abd x 20     Neuromuscular Re-education for improved balance and proprioception x 30  min   3 sec march with green sport cord (post pelvis) "   Single side step and hold with green sport cord  (lateral pelvis)   1/4 turns to L  Over obstacles       Written Home Exercises Provided:    -gait conditioning exercises 1-2 x per day at home.  Pt received a written copy of exercises to perform at home.   -1/4 turns to the L at kitchen ctr x 10     Assessment    Roby is a 86 y.o. male referred to outpatient physical therapy with a medical diagnosis of Left sided weakness with gait and balance deficits due to recent CVA .    Improving stability with gait and dynamic standing activity, cont with decreased strength and SL stability of LLE     Short Term GOALS: ~MET   -Tolerate HEP for hip flexibility, core strength and conditioning (MET)   - Moditfied indep with gait on even surfaces using SPC on R side (MET)    Long Term GOALS:  -Indep with HEP for hip and core strength, LLE strength, gait stability and standing balance (progressing)   -Improved HS length + 10 degrees Phil  for improved pelvic alignment and gait stability (progressing)   -Improved posterior hip strength L (-) trendelenburg sign on L with unsupported gait.   -FOTO reporting < 35% limitation with all daily mobility  -Functional Gait Assessment + 3-5 points for decreased fall risk       Plan     Certification Period: 6/9/17 - 7/21/17      PT Plan of care 1 x per week for 4-6 weeks progressing towards current and updated goals.       Rupert Francis, PT

## 2017-06-20 ENCOUNTER — CLINICAL SUPPORT (OUTPATIENT)
Dept: REHABILITATION | Facility: HOSPITAL | Age: 82
End: 2017-06-20
Attending: INTERNAL MEDICINE
Payer: MEDICARE

## 2017-06-20 ENCOUNTER — CLINICAL SUPPORT (OUTPATIENT)
Dept: ELECTROPHYSIOLOGY | Facility: CLINIC | Age: 82
End: 2017-06-20
Payer: MEDICARE

## 2017-06-20 DIAGNOSIS — Z95.0 CARDIAC PACEMAKER IN SITU: ICD-10-CM

## 2017-06-20 DIAGNOSIS — I63.442 EMBOLIC STROKE INVOLVING LEFT CEREBELLAR ARTERY: Primary | ICD-10-CM

## 2017-06-20 DIAGNOSIS — Z95.0 PACEMAKER: ICD-10-CM

## 2017-06-20 DIAGNOSIS — R29.898 LEFT LEG WEAKNESS: ICD-10-CM

## 2017-06-20 PROCEDURE — 97110 THERAPEUTIC EXERCISES: CPT | Mod: PN

## 2017-06-20 PROCEDURE — 93294 REM INTERROG EVL PM/LDLS PM: CPT | Mod: ,,, | Performed by: INTERNAL MEDICINE

## 2017-06-20 PROCEDURE — 93296 REM INTERROG EVL PM/IDS: CPT | Mod: PBBFAC | Performed by: INTERNAL MEDICINE

## 2017-06-20 PROCEDURE — 97112 NEUROMUSCULAR REEDUCATION: CPT | Mod: PN

## 2017-06-20 NOTE — PROGRESS NOTES
"OUTPATIENT PHYSICAL THERAPY      Name: Roby Rodriguez  Clinic Number: [id number]    Evaluation Date: 2017  Visit #:    Precautions: fall risk, cardiac       Physician: Nelida Eduardo MD    Subjective   History of Present Illness: Recent CVA with L sided weakness and decline in stability/independence with gait, mobility and standing balance   Chief complaint: decreased stability with gait and standing balance   Pain: current 0/10  Pt reports continued improvement of LE strength and stability. Keeping up with exercises at home, but feels worn out today.   Pts goals: improve stability and independence with walking and standing    17:  No new complaints, continued weakness with LLE      Objective     + trendelenburg sign L hip during gait   + decreased coordination LLE: heel taps/toe taps    Functional Gait Assessment: (prior)   1. Gait on level surface = 2  2. Change in Gait Speed = 2  3. Gait with horizontal head turns  = 1  4. Gait with vertical head turns = 1  5. Gait with pivot turns = 2  6. Step over obstacle = 2  7. Gait with Narrow PIPPA = 0  8. Gait with eyes closed = 1  9. Ambulating Backwards = 1  10. Steps = 1    Score    \  TREATMENT     Roby received 30 minutes (15 min 1:1)  of therapeutic exercise for improved strength, conditioning, postural stability includin  min recumbent bike level 2-4 for increased LE conditioning   UBE level 1.5 resistance 3'fw/3'bk (towel roll along spine at mid-low back for posture)    Active HS stretch 3' x 10 Phil   PNF LLE slow reversals x 10 all planes     LTR with LEs on blue s-ball x 2'    HEP  S/L clamshells and hip abd with RTB L hip 2x10   Bridges with RTB 2x10      Standing calf stretch on board x 2'  Calf raises with ecc lowering 2"/4" x 15   Lateral step ups 6" 2 x 10 L   Squats with YTB hip abd x 20     Neuromuscular Re-education for improved balance and proprioception x 30  min (15 min 1:1)   3 sec march with green sport cord " (post pelvis)   Single side step and hold with green sport cord  (lateral pelvis)   1/4 turns to L  Over obstacles       Written Home Exercises Provided:    -gait conditioning exercises 1-2 x per day at home.  Pt received a written copy of exercises to perform at home.   -1/4 turns to the L at kitchen ctr x 10     Assessment    Roby is a 87 y.o. male referred to outpatient physical therapy with a medical diagnosis of Left sided weakness with gait and balance deficits due to recent CVA .    Improving stability with gait and dynamic standing activity, cont with decreased strength and SL stability of LLE     Short Term GOALS: ~MET   -Tolerate HEP for hip flexibility, core strength and conditioning (MET)   - Moditfied indep with gait on even surfaces using SPC on R side (MET)    Long Term GOALS:  -Indep with HEP for hip and core strength, LLE strength, gait stability and standing balance (progressing)   -Improved HS length + 10 degrees Phil  for improved pelvic alignment and gait stability (progressing)   -Improved posterior hip strength L (-) trendelenburg sign on L with unsupported gait.   -FOTO reporting < 35% limitation with all daily mobility  -Functional Gait Assessment + 3-5 points for decreased fall risk       Plan     Certification Period: 6/9/17 - 7/21/17      PT Plan of care 1 x per week for 4-6 weeks progressing towards current and updated goals.       Rupert Francis, PT

## 2017-06-21 DIAGNOSIS — I47.10 SVT (SUPRAVENTRICULAR TACHYCARDIA): ICD-10-CM

## 2017-06-21 DIAGNOSIS — Z95.0 PACEMAKER: Primary | ICD-10-CM

## 2017-06-21 DIAGNOSIS — I49.1 PREMATURE ATRIAL CONTRACTION: ICD-10-CM

## 2017-06-21 DIAGNOSIS — I48.91 A-FIB: ICD-10-CM

## 2017-06-25 VITALS
SYSTOLIC BLOOD PRESSURE: 124 MMHG | RESPIRATION RATE: 18 BRPM | DIASTOLIC BLOOD PRESSURE: 64 MMHG | BODY MASS INDEX: 24.8 KG/M2 | DIASTOLIC BLOOD PRESSURE: 68 MMHG | OXYGEN SATURATION: 97 % | OXYGEN SATURATION: 99 % | SYSTOLIC BLOOD PRESSURE: 122 MMHG | HEART RATE: 64 BPM | BODY MASS INDEX: 25.23 KG/M2 | RESPIRATION RATE: 18 BRPM | WEIGHT: 186 LBS | WEIGHT: 188 LBS | HEART RATE: 68 BPM

## 2017-06-26 NOTE — PROGRESS NOTES
Mr. Rodriguez doing well at home with his wife and daughter, very pleasant and talkative patient functioning well mentally and with BLE weakness, perfoms 50% of ADL's independently but needs some assistance at times. Strong family support from mother and daughter, assists with medications diet and MD follow up visits. BP/Pulse WNL today, no complaints of headaches, dizziness or nausea. Eating well and sleeping with no difficulties

## 2017-06-28 ENCOUNTER — CLINICAL SUPPORT (OUTPATIENT)
Dept: REHABILITATION | Facility: HOSPITAL | Age: 82
End: 2017-06-28
Attending: INTERNAL MEDICINE
Payer: MEDICARE

## 2017-06-28 DIAGNOSIS — R29.898 LEFT LEG WEAKNESS: Primary | ICD-10-CM

## 2017-06-28 PROCEDURE — 97112 NEUROMUSCULAR REEDUCATION: CPT | Mod: PN

## 2017-06-28 PROCEDURE — G8979 MOBILITY GOAL STATUS: HCPCS | Mod: CJ,PN

## 2017-06-28 PROCEDURE — 97110 THERAPEUTIC EXERCISES: CPT | Mod: PN

## 2017-06-28 PROCEDURE — G8980 MOBILITY D/C STATUS: HCPCS | Mod: CJ,PN

## 2017-06-28 NOTE — PROGRESS NOTES
"OUTPATIENT PHYSICAL THERAPY      Name: Roby Rodriguez  Clinic Number: [id number]    Evaluation Date: 2017     Precautions: fall risk, cardiac     Physician: Nelida Eduardo MD    Subjective   History of Present Illness: Recent CVA with L sided weakness and decline in stability/independence with gait, mobility and standing balance   Chief complaint: decreased stability with gait and standing balance   Pain: current 0/10  Pts goals: improve stability and independence with walking and standing    Objective     Functional Gait Assessment:  1. Gait on level surface = 2  2. Change in Gait Speed = 2  3. Gait with horizontal head turns  = 2  4. Gait with vertical head turns = 2  5. Gait with pivot turns = 2  6. Step over obstacle = 2  7. Gait with Narrow PIPPA = 0  8. Gait with eyes closed = 1  9. Ambulating Backwards = 1  10. Steps = 1  Score 15/30     TUG = 10 sec     Gait speed = .85 m/s    360 turns:   3 sec 8 steps to the R  3 sec 10  steps to the L     TREATMENT     Roby received 30 minutes  of therapeutic exercise for improved strength, conditioning, postural stability includin  min recumbent bike level 2-4 for increased LE conditioning   UBE level 1.5 resistance 3'fw/3'bk (towel roll along spine at mid-low back for posture)    Slantboard stretch x90"   rockerboard a/p; s/s x 90"     Active HS stretch 3' x 10 Phil   PNF LLE slow reversals x 10 all planes     LTR with LEs on blue s-ball x 2'    HEP  S/L clamshells and hip abd with RTB L hip 2x10   Bridges with RTB 2x10      Standing calf stretch on board x 2'  Calf raises with ecc lowering 2"/4" x 15   Lateral step ups 6" 2 x 10 L   Squats with YTB hip abd x 20     Neuromuscular Re-education for improved balance and proprioception x 30 min  Ball toss, dribble, agility ladder, 360 turns     Written Home Exercises Provided:    -gait conditioning exercises 1-2 x per day at home.  Pt received a written copy of exercises to perform at home.   -1/4 turns " to the L at [facility] ctr x 10     Assessment    Roby is a 87 y.o. male referred to outpatient physical therapy with a medical diagnosis of Left sided weakness with gait and balance deficits due to recent CVA;   Pt had progressed towards all of his PT goals, he continues with gait and balance deficits requiring supervision for gait on uneven surfaces with intermittent use of SPC for stability.   He is independent with HEP for continued strength and balance including gait conditioning and 1/4 turns with the help of his dtr.   He will continue with exercise at home for now, follow-up with new MD order if concerns about his mobility do arise in the future     Short Term GOALS: ~MET   -Tolerate HEP for hip flexibility, core strength and conditioning (MET)   - Moditfied indep with gait on even surfaces using SPC on R side (MET)    Long Term GOALS:  -Indep with HEP for hip and core strength, LLE strength, gait stability and standing balance ~MET   -Improved HS length + 10 degrees Phil  for improved pelvic alignment and gait stability ~ progressed  -Improved posterior hip strength L (-) trendelenburg sign on L with unsupported gait ~ progressing   -FOTO reporting < 35% limitation with all daily mobility   -Functional Gait Assessment + 3-5 points for decreased fall risk ~ Progressed     Plan   D/c to home exercise program with assist from family.     Rupert Francis, PT

## 2017-07-03 RX ORDER — ATORVASTATIN CALCIUM 40 MG/1
40 TABLET, FILM COATED ORAL DAILY
Qty: 30 TABLET | Refills: 11 | OUTPATIENT
Start: 2017-07-03 | End: 2018-07-03

## 2017-07-05 ENCOUNTER — TELEPHONE (OUTPATIENT)
Dept: ADMINISTRATIVE | Facility: OTHER | Age: 82
End: 2017-07-05

## 2017-07-07 ENCOUNTER — HOME CARE VISIT (OUTPATIENT)
Dept: NEUROLOGY | Facility: HOSPITAL | Age: 82
End: 2017-07-07

## 2017-07-07 VITALS
WEIGHT: 192 LBS | SYSTOLIC BLOOD PRESSURE: 114 MMHG | BODY MASS INDEX: 25.33 KG/M2 | OXYGEN SATURATION: 97 % | RESPIRATION RATE: 18 BRPM | HEART RATE: 62 BPM | DIASTOLIC BLOOD PRESSURE: 78 MMHG

## 2017-07-11 ENCOUNTER — PATIENT MESSAGE (OUTPATIENT)
Dept: INTERNAL MEDICINE | Facility: CLINIC | Age: 82
End: 2017-07-11

## 2017-07-12 RX ORDER — LISINOPRIL 20 MG/1
20 TABLET ORAL 2 TIMES DAILY
Qty: 180 TABLET | Refills: 3 | Status: SHIPPED | OUTPATIENT
Start: 2017-07-12 | End: 2018-06-25 | Stop reason: SDUPTHER

## 2017-08-02 ENCOUNTER — TELEPHONE (OUTPATIENT)
Dept: INTERNAL MEDICINE | Facility: CLINIC | Age: 82
End: 2017-08-02

## 2017-08-02 NOTE — TELEPHONE ENCOUNTER
----- Message from Talia Randolph sent at 8/2/2017  2:01 PM CDT -----  Contact: Son/Jarvis/[phone]  Pt's son said that he is calling in regards to pt was hospitalized in [place] and he pt was told to follow up with his pcp when he gets back home pt's son wants to know if he can be seen on 8/7/2017 by  he also stated he needs discuss some other things with the nurse also. Please call and advise              Thanks!!!

## 2017-08-03 ENCOUNTER — PATIENT MESSAGE (OUTPATIENT)
Dept: INTERNAL MEDICINE | Facility: CLINIC | Age: 82
End: 2017-08-03

## 2017-08-07 ENCOUNTER — OFFICE VISIT (OUTPATIENT)
Dept: INTERNAL MEDICINE | Facility: CLINIC | Age: 82
End: 2017-08-07
Payer: MEDICARE

## 2017-08-07 ENCOUNTER — LAB VISIT (OUTPATIENT)
Dept: LAB | Facility: HOSPITAL | Age: 82
End: 2017-08-07
Payer: MEDICARE

## 2017-08-07 VITALS
HEART RATE: 87 BPM | DIASTOLIC BLOOD PRESSURE: 77 MMHG | SYSTOLIC BLOOD PRESSURE: 139 MMHG | BODY MASS INDEX: 26.27 KG/M2 | WEIGHT: 198.19 LBS | HEIGHT: 73 IN

## 2017-08-07 DIAGNOSIS — K92.0 HEMATEMESIS, PRESENCE OF NAUSEA NOT SPECIFIED: ICD-10-CM

## 2017-08-07 DIAGNOSIS — Z09 HOSPITAL DISCHARGE FOLLOW-UP: Primary | ICD-10-CM

## 2017-08-07 DIAGNOSIS — Z09 HOSPITAL DISCHARGE FOLLOW-UP: ICD-10-CM

## 2017-08-07 LAB
BASOPHILS # BLD AUTO: 0.04 K/UL
BASOPHILS NFR BLD: 0.6 %
D DIMER PPP IA.FEU-MCNC: 2.1 MG/L FEU
DIFFERENTIAL METHOD: ABNORMAL
EOSINOPHIL # BLD AUTO: 0.4 K/UL
EOSINOPHIL NFR BLD: 5 %
ERYTHROCYTE [DISTWIDTH] IN BLOOD BY AUTOMATED COUNT: 14.2 %
HCT VFR BLD AUTO: 33.9 %
HGB BLD-MCNC: 11.6 G/DL
LYMPHOCYTES # BLD AUTO: 2.2 K/UL
LYMPHOCYTES NFR BLD: 30.7 %
MCH RBC QN AUTO: 33.5 PG
MCHC RBC AUTO-ENTMCNC: 34.2 G/DL
MCV RBC AUTO: 98 FL
MONOCYTES # BLD AUTO: 0.9 K/UL
MONOCYTES NFR BLD: 11.9 %
NEUTROPHILS # BLD AUTO: 3.7 K/UL
NEUTROPHILS NFR BLD: 51.4 %
PLATELET # BLD AUTO: 200 K/UL
PMV BLD AUTO: 10.5 FL
RBC # BLD AUTO: 3.46 M/UL
WBC # BLD AUTO: 7.17 K/UL

## 2017-08-07 PROCEDURE — 36415 COLL VENOUS BLD VENIPUNCTURE: CPT

## 2017-08-07 PROCEDURE — 99999 PR PBB SHADOW E&M-EST. PATIENT-LVL III: CPT | Mod: PBBFAC,GC,, | Performed by: HOSPITALIST

## 2017-08-07 PROCEDURE — 3008F BODY MASS INDEX DOCD: CPT | Mod: GC,,, | Performed by: HOSPITALIST

## 2017-08-07 PROCEDURE — 1159F MED LIST DOCD IN RCRD: CPT | Mod: GC,,, | Performed by: HOSPITALIST

## 2017-08-07 PROCEDURE — 85379 FIBRIN DEGRADATION QUANT: CPT

## 2017-08-07 PROCEDURE — 99213 OFFICE O/P EST LOW 20 MIN: CPT | Mod: S$PBB,GC,, | Performed by: HOSPITALIST

## 2017-08-07 PROCEDURE — 1126F AMNT PAIN NOTED NONE PRSNT: CPT | Mod: GC,,, | Performed by: HOSPITALIST

## 2017-08-07 PROCEDURE — 85025 COMPLETE CBC W/AUTO DIFF WBC: CPT

## 2017-08-07 NOTE — PATIENT INSTRUCTIONS
You can resume drinking one cup of coffee per day.  Continue holding off on drinking alcohol.    We have ordered a referral to GI for you.    We recommend you wear compression stockings for the swelling in your legs.  We have ordered a blood test called a D dimer to assess your risk of a clot in your leg.      We have refilled your prescription for auralgan.

## 2017-08-07 NOTE — PROGRESS NOTES
Subjective:       Patient ID: Roby Rodriguez is a 87 y.o. male.      Chief Complaint: hospital follow up      HPI  Roby Rodriguez is a 87 y.o. male with PMH of Afib (for which he normaly takes xarelto), s/p pacemaker, CVA (3/31/2017, residual L leg weakness) who presents for hospital follow up.      He presented to [facility] in [place] on 7/29 for vomiting and hematemesis, by the time he presented his symptoms resolved.  He underwent EGD on 7/31 which revealed possible hemorrhagic gastritis with no active bleeding.  He was discharged with sucralfate and pantoprazole and told to hold the xarelto for one week. He was to follow up with PCP and a GI doctor.  He is here to follow up with PCP (Dr. Friedman) but has not followed up with GI doctor.      He has felt well since the hospitalization.  He has had no nausea or vomiting since then.  Denies hematemesis or any bleeding.  He has no issues with bowel movements.   He is eating and drinking well now.   He denies fatigue, dizziness, lightheadedness, abdominal pain.      He is concerned however about the increased swelling in his left leg, he reports no edema in his lower extremities before.      He is concerned about when he can resume drinking coffee and alcohol.      He is taking all of his medications as prescribed.      Review of Systems   Constitutional: Negative for chills, fever and malaise/fatigue.   HENT: Negative for sore throat.    Eyes: Negative for blurred vision and pain.   Respiratory: Negative for cough and shortness of breath.    Cardiovascular: Negative for chest pain and leg swelling.   Gastrointestinal: Negative for abdominal pain, nausea and vomiting.   Genitourinary: Negative for dysuria and frequency.   Musculoskeletal: Negative for back pain and myalgias.   Skin: Negative for itching and rash.   Neurological: Negative for dizziness, loss of consciousness and headaches.           Objective:     Vitals:    08/07/17 1529   BP: 139/77  "  Pulse: 87     Estimated body mass index is 26.15 kg/m² as calculated from the following:    Height as of this encounter: 6' 1" (1.854 m).    Weight as of this encounter: 89.9 kg (198 lb 3.1 oz).    Physical Exam   Constitutional: He is oriented to person, place, and time and well-developed, well-nourished, and in no distress.   HENT:   Head: Normocephalic and atraumatic.   Eyes: Conjunctivae and EOM are normal. Pupils are equal, round, and reactive to light.   Neck: Neck supple. No tracheal deviation present. No thyromegaly present.   Cardiovascular: Normal rate, regular rhythm, normal heart sounds and intact distal pulses.    No murmur heard.  Pulmonary/Chest: Effort normal and breath sounds normal. No respiratory distress. He has no wheezes. He has no rales.   Abdominal: Soft. Bowel sounds are normal. He exhibits no distension. There is no tenderness.   Musculoskeletal:   Bilaterally lower extremity pitting edema L > R.   Neurological: He is alert and oriented to person, place, and time. GCS score is 15.   Skin: Skin is warm and dry.         Assessment/Plan:     Hospital follow up for hematemesis  --resume xarelto as scheduled tonight  --CBC to monitor hemoglobin and hematocrit.  --okay to resume one cup of coffee a day  --referral to GI    Lower extremity edema  --keep legs propped up  --Ddimer given he has been off xarelto x 1 week, had recent hospitalization, and recent travel  --compression stockings        Medication List with Changes/Refills   Current Medications    ANTIPYRINE-BENZOCAINE (AURALGAN OR EQUIV) 5.4-1.4 % DROP    PLACE 3 DROPS INTO BOTH EARS 4 (FOUR) TIMES DAILY.    ATORVASTATIN (LIPITOR) 40 MG TABLET    Take 1 tablet (40 mg total) by mouth once daily.    AZELASTINE (ASTELIN) 137 MCG (0.1 %) NASAL SPRAY    1 spray (137 mcg total) by Nasal route 2 (two) times daily.    GUANFACINE (TENEX) 1 MG TAB    TAKE 1 TABLET EVERY EVENING    LISINOPRIL (PRINIVIL,ZESTRIL) 20 MG TABLET    Take 1 tablet (20 mg " total) by mouth 2 (two) times daily.    METOPROLOL SUCCINATE (TOPROL-XL) 50 MG 24 HR TABLET    TAKE 1 TABLET ONCE DAILY    METOPROLOL SUCCINATE (TOPROL-XL) 50 MG 24 HR TABLET    TAKE 1 TABLET ONCE DAILY    MINOCYCLINE (MINOCIN,DYNACIN) 100 MG CAPSULE    Take one by mouth bid    RIVAROXABAN (XARELTO) 20 MG TAB    Take 1 tablet (20 mg total) by mouth once daily.     Follow up in 3 months.    This case was discussed with Dr. Elder Gramajo, PGY-2

## 2017-08-08 ENCOUNTER — TELEPHONE (OUTPATIENT)
Dept: INTERNAL MEDICINE | Facility: CLINIC | Age: 82
End: 2017-08-08

## 2017-08-08 DIAGNOSIS — R60.9 EDEMA, UNSPECIFIED TYPE: ICD-10-CM

## 2017-08-08 DIAGNOSIS — M79.89 LEG SWELLING: Primary | ICD-10-CM

## 2017-08-08 DIAGNOSIS — R79.89 POSITIVE D DIMER: ICD-10-CM

## 2017-08-08 NOTE — TELEPHONE ENCOUNTER
----- Message from Letty Sorenson sent at 8/8/2017  2:04 PM CDT -----  Contact: [facility] - [facility] at [phone]  Pharmacy requesting a call back regarding pt's script antipyrine-benzocaine (AURALGAN OR EQUIV) 5.4-1.4 % Drop.       [facility]/pharmacy #[id number] - [place] - [address]   [phone] (Phone)  [phone] (Fax)

## 2017-08-08 NOTE — PROGRESS NOTES
I have been physically present during the critical or key portions of the service furnished by the resident and I have participated in the management of the patient.  Dr. Friedman

## 2017-08-08 NOTE — TELEPHONE ENCOUNTER
Called Mr Rodriguez on his mobile.  Daughter answered.  Conveyed that D dimer is elevated and that we have ordered a venous doppler ultrasound to look for thrombosis.  Conveyed CBC results as well.

## 2017-08-09 ENCOUNTER — TELEPHONE (OUTPATIENT)
Dept: INTERNAL MEDICINE | Facility: CLINIC | Age: 82
End: 2017-08-09

## 2017-08-10 ENCOUNTER — HOME CARE VISIT (OUTPATIENT)
Dept: NEUROLOGY | Facility: HOSPITAL | Age: 82
End: 2017-08-10

## 2017-08-10 ENCOUNTER — OFFICE VISIT (OUTPATIENT)
Dept: GASTROENTEROLOGY | Facility: CLINIC | Age: 82
End: 2017-08-10
Payer: MEDICARE

## 2017-08-10 ENCOUNTER — HOSPITAL ENCOUNTER (OUTPATIENT)
Dept: RADIOLOGY | Facility: HOSPITAL | Age: 82
Discharge: HOME | End: 2017-08-10
Attending: HOSPITALIST
Payer: MEDICARE

## 2017-08-10 VITALS
WEIGHT: 199.5 LBS | HEIGHT: 73 IN | SYSTOLIC BLOOD PRESSURE: 138 MMHG | BODY MASS INDEX: 26.44 KG/M2 | DIASTOLIC BLOOD PRESSURE: 72 MMHG | RESPIRATION RATE: 16 BRPM

## 2017-08-10 DIAGNOSIS — Z87.19 HISTORY OF GASTRITIS: Primary | ICD-10-CM

## 2017-08-10 DIAGNOSIS — R60.9 EDEMA, UNSPECIFIED TYPE: ICD-10-CM

## 2017-08-10 DIAGNOSIS — R74.8 ELEVATED ALKALINE PHOSPHATASE LEVEL: ICD-10-CM

## 2017-08-10 DIAGNOSIS — M79.89 LEG SWELLING: ICD-10-CM

## 2017-08-10 PROCEDURE — 99214 OFFICE O/P EST MOD 30 MIN: CPT | Mod: S$PBB,,, | Performed by: NURSE PRACTITIONER

## 2017-08-10 PROCEDURE — 93970 EXTREMITY STUDY: CPT | Mod: TC,PO

## 2017-08-10 PROCEDURE — 3008F BODY MASS INDEX DOCD: CPT | Mod: ,,, | Performed by: NURSE PRACTITIONER

## 2017-08-10 PROCEDURE — 93970 EXTREMITY STUDY: CPT | Mod: 26,,, | Performed by: RADIOLOGY

## 2017-08-10 PROCEDURE — 99999 PR PBB SHADOW E&M-EST. PATIENT-LVL IV: CPT | Mod: PBBFAC,,, | Performed by: NURSE PRACTITIONER

## 2017-08-10 PROCEDURE — 1126F AMNT PAIN NOTED NONE PRSNT: CPT | Mod: ,,, | Performed by: NURSE PRACTITIONER

## 2017-08-10 PROCEDURE — 1159F MED LIST DOCD IN RCRD: CPT | Mod: ,,, | Performed by: NURSE PRACTITIONER

## 2017-08-10 RX ORDER — PANTOPRAZOLE SODIUM 40 MG/1
40 TABLET, DELAYED RELEASE ORAL 2 TIMES DAILY
COMMUNITY
Start: 2017-08-01 | End: 2017-11-08

## 2017-08-10 RX ORDER — SUCRALFATE 1 G/1
1 TABLET ORAL
COMMUNITY
Start: 2017-08-01 | End: 2017-11-08

## 2017-08-10 NOTE — PROGRESS NOTES
"Subjective:       Patient ID: Roby Rodriguez is a 87 y.o. male Body mass index is 26.32 kg/m².    Chief Complaint: Hospital Follow Up    This patient is new to me.  Referred by Dr. Gramajo for hospital discharge follow-up    Patient is here with his daughter, whom assisted with history. Reports they were on vacation in [place] and after overeating italian food for dinner on 7/28/17 he became sick with nausea and persistent vomiting, some of which was black in color, so he went to the ER and was admitted and a few days later had EGD. Patient reports he is feeling great.  Reviewed Dr. Gramajo's visit note from 8/7/17: "He presented to [facility] in [place] on 7/29 for vomiting and hematemesis, by the time he presented his symptoms resolved.  He underwent EGD on 7/31 which revealed possible hemorrhagic gastritis with no active bleeding.  He was discharged with sucralfate and pantoprazole and told to hold the xarelto for one week. He was to follow up with PCP and a GI doctor.  He is here to follow up with PCP (Dr. Friedman) but has not followed up with GI doctor.  "      GI Problem   Primary symptoms do not include fever, weight loss, fatigue, abdominal pain, nausea (resolved), vomiting (had black emesis when it occurred; resolved prior to ER visit), diarrhea, melena, hematemesis, hematochezia or dysuria. The illness began more than 7 days ago (started 7/28/17 with nausea vomiting that persisted and went to ER and was admitted and had EGD 7/31/17 at [facility]). The problem has been resolved.   The illness does not include chills, dysphagia, odynophagia or constipation. Associated symptoms comments: TREATMENT: protonix 40 mg twice daily, carafate 1 gram qid. Significant associated medical issues include GERD (was taking jayme-seltzer prn prior to hospital visit; not taking anymore). Associated medical issues do not include inflammatory bowel disease or PUD.     Review of Systems   Constitutional: " Negative for appetite change, chills, fatigue, fever, unexpected weight change and weight loss.   HENT: Negative for sore throat and trouble swallowing.    Respiratory: Negative for cough, choking and shortness of breath.    Cardiovascular: Negative for chest pain.   Gastrointestinal: Negative for abdominal pain, anal bleeding, blood in stool, constipation, diarrhea, dysphagia, hematemesis, hematochezia, melena, nausea (resolved), rectal pain and vomiting (had black emesis when it occurred; resolved prior to ER visit).   Genitourinary: Negative for difficulty urinating, dysuria, flank pain, frequency and urgency.   Neurological: Negative for weakness.       Past Medical History:   Diagnosis Date    *Atrial fibrillation     Allergy     Arthritis     Atrial fibrillation     Basal cell carcinoma 10/12/10    left earlobe    Basal cell carcinoma 7/23/2014    right neck    Cancer     Hx of Squamous Cell CA Scalp    Conductive hearing loss, bilateral     Chronic Eustachian Tube Dysfunction    Frequency of urination     Gastroenteritis     Headache(784.0)     Hypertension     Kidney stones     Otitis media     SQUAMOUS CELL CARCINOMA 7/27/07    SCC ant scalp    Supraventricular tachycardia      Past Surgical History:   Procedure Laterality Date    ADENOIDECTOMY      Cardiac pacemaker implantation      COLONOSCOPY  2004    reportedly normal in 2004    Duputyren Contracture Right Hand      INSERT / REPLACE / REMOVE PACEMAKER      Resection of Skin Cancer Scalp      TONSILLECTOMY      UPPER GASTROINTESTINAL ENDOSCOPY  07/31/2017    [place]- gastritis per patient report     Family History   Problem Relation Age of Onset    Melanoma Neg Hx     Psoriasis Neg Hx     Lupus Neg Hx     Eczema Neg Hx     Colon cancer Neg Hx     Colon polyps Neg Hx     Crohn's disease Neg Hx     Ulcerative colitis Neg Hx     Stomach cancer Neg Hx     Esophageal cancer Neg Hx      Wt Readings from Last 10 Encounters:    08/07/17 89.9 kg (198 lb 3.1 oz)   07/07/17 87.1 kg (192 lb)   05/26/17 85.3 kg (188 lb)   05/19/17 86.7 kg (191 lb 2.2 oz)   05/02/17 88 kg (194 lb 0.1 oz)   04/26/17 84.4 kg (186 lb)   04/12/17 85.4 kg (188 lb 4.4 oz)   03/31/17 85.3 kg (188 lb)   03/28/17 86.2 kg (190 lb)   03/21/17 86.2 kg (190 lb)     Lab Results   Component Value Date    WBC 7.17 08/07/2017    HGB 11.6 (L) 08/07/2017    HCT 33.9 (L) 08/07/2017    MCV 98 08/07/2017     08/07/2017     CMP  Sodium   Date Value Ref Range Status   04/02/2017 137 136 - 145 mmol/L Final     Potassium   Date Value Ref Range Status   04/02/2017 3.6 3.5 - 5.1 mmol/L Final     Chloride   Date Value Ref Range Status   04/02/2017 104 95 - 110 mmol/L Final     CO2   Date Value Ref Range Status   04/02/2017 24 23 - 29 mmol/L Final     Glucose   Date Value Ref Range Status   04/02/2017 87 70 - 110 mg/dL Final     BUN, Bld   Date Value Ref Range Status   04/02/2017 11 8 - 23 mg/dL Final     Creatinine   Date Value Ref Range Status   04/02/2017 0.9 0.5 - 1.4 mg/dL Final     Calcium   Date Value Ref Range Status   04/02/2017 8.7 8.7 - 10.5 mg/dL Final     Total Protein   Date Value Ref Range Status   04/02/2017 6.4 6.0 - 8.4 g/dL Final     Albumin   Date Value Ref Range Status   04/02/2017 3.1 (L) 3.5 - 5.2 g/dL Final     Total Bilirubin   Date Value Ref Range Status   04/02/2017 1.0 0.1 - 1.0 mg/dL Final     Comment:     For infants and newborns, interpretation of results should be based  on gestational age, weight and in agreement with clinical  observations.  Premature Infant recommended reference ranges:  Up to 24 hours.............<8.0 mg/dL  Up to 48 hours............<12.0 mg/dL  3-5 days..................<15.0 mg/dL  6-29 days.................<15.0 mg/dL       Alkaline Phosphatase   Date Value Ref Range Status   04/02/2017 142 (H) 55 - 135 U/L Final     AST   Date Value Ref Range Status   04/02/2017 32 10 - 40 U/L Final     ALT   Date Value Ref Range Status    04/02/2017 28 10 - 44 U/L Final     Anion Gap   Date Value Ref Range Status   04/02/2017 9 8 - 16 mmol/L Final     eGFR if    Date Value Ref Range Status   04/02/2017 >60.0 >60 mL/min/1.73 m^2 Final     eGFR if non    Date Value Ref Range Status   04/02/2017 >60.0 >60 mL/min/1.73 m^2 Final     Comment:     Calculation used to obtain the estimated glomerular filtration  rate (eGFR) is the CKD-EPI equation. Since race is unknown   in our information system, the eGFR values for   -American and Non--American patients are given   for each creatinine result.       Lab Results   Component Value Date    TSH 1.750 03/31/2017     Objective:      Physical Exam   Constitutional: He is oriented to person, place, and time. He appears well-developed and well-nourished. No distress.   Patient uses cane for assistance with walking.   HENT:   Mouth/Throat: Oropharynx is clear and moist and mucous membranes are normal. No oral lesions. No oropharyngeal exudate.   Eyes: Conjunctivae are normal. Pupils are equal, round, and reactive to light. No scleral icterus.   Neck: Neck supple. No tracheal deviation present.   Cardiovascular: Normal rate.    Pulmonary/Chest: Effort normal and breath sounds normal. No respiratory distress. He has no wheezes.   Abdominal: Soft. Normal appearance and bowel sounds are normal. He exhibits no distension, no abdominal bruit and no mass. There is no hepatosplenomegaly. There is no tenderness. There is no rigidity, no rebound, no guarding, no tenderness at McBurney's point and negative Dhaliwal's sign. No hernia.   Lymphadenopathy:     He has no cervical adenopathy.   Neurological: He is alert and oriented to person, place, and time.   Skin: Skin is warm and dry. No rash noted. He is not diaphoretic. No erythema. No pallor.   Non-jaundiced   Psychiatric: He has a normal mood and affect. His behavior is normal.   Nursing note and vitals reviewed.      Assessment:        1. History of gastritis    2. Elevated alkaline phosphatase level        Plan:     Patient agreed to sign medical records release consent for us to obtain records from [facility] (to include blood work, discharge summary, EGD reports, and imaging)    History of gastritis  - CONTINUE PROTONIX 40 mg BID as directed, take before breakfast & dinner, discussed about possible long term use of medication (prefer to use lowest effective dose or discontinuing if possible), pt verbalized understanding  - CONTINUE CARAFATE 1 gram QID and discontinue after completion of 1 month course/current prescription  -Educated patient on lifestyle modifications to help control/reduce reflux/abdominal pain including: avoid large meals, avoid eating within 2-3 hours of bedtime (avoid late night eating & lying down soon after eating), elevate head of bed if nocturnal symptoms are present, smoking cessation (if current smoker), & weight loss (if overweight).   -Educated to avoid known foods which trigger reflux symptoms & to minimize/avoid high-fat foods, chocolate, caffeine, citrus, alcohol, & tomato products.  -Advised to avoid/limit use of NSAID's, since they can cause GI upset, bleeding, and/or ulcers. If needed, take with food.    Elevated alkaline phosphatase level  -     Hepatic function panel; Future; Expected date: 08/10/2017  -     Gamma GT; Future; Expected date: 08/10/2017  -     Alkaline phosphatase, isoenzymes; Future; Expected date: 08/10/2017  Recommend follow-up with Primary Care Provider for continued evaluation and management.    Return in about 2 months (around 10/10/2017), or if symptoms worsen or fail to improve.      If no improvement in symptoms or symptoms worsen, call/follow-up at clinic or go to ER.

## 2017-08-10 NOTE — PATIENT INSTRUCTIONS
Gastritis (Adult)    Gastritis is inflammation and irritation of the stomach lining. It can be present for a short time (acute) or be long lasting (chronic). Gastritis is often caused by infection with bacteria called H pylori. More than a third of people in the [place] have this bacteria in their bodies. In many cases, H pylori causes no problems or symptoms. In some people, though, the infection irritates the stomach lining and causes gastritis. Other causes of stomach irritation include drinking alcohol or taking pain-relieving medicines called NSAIDs (such as aspirin or ibuprofen).   Symptoms of gastritis can include:  · Abdominal pain or bloating  · Loss of appetite  · Nausea or vomiting  · Vomiting blood or having black stools  · Feeling more tired than usual  An inflamed and irritated stomach lining is more likely to develop a sore called an ulcer. To help prevent this, gastritis should be treated.  Home care  If needed, medicines may be prescribed. If you have H pylori infection, treating it will likely relieve your symptoms. Other changes can help reduce stomach irritation and help it heal.  · If you have been prescribed medicines for H pylori infection, take them as directed. Take all of the medicine until it is finished or your healthcare provider tells you to stop, even if you feel better.  · Your healthcare provider may recommend avoiding NSAIDs. If you take daily aspirin for your heart or other medical reasons, do not stop without talking to your healthcare provider first.  · Avoid drinking alcohol.  · Stop smoking. Smoking can irritate the stomach and delay healing. As much as possible, stay away from second hand smoke.  Follow-up care  Follow up with your healthcare provider, or as advised by our staff. Testing may be needed to check for inflammation or an ulcer.  When to seek medical advice  Call your healthcare provider for any of the following:  · Stomach pain that gets worse or moves to the lower  right abdomen (appendix area)  · Chest pain that appears or gets worse, or spreads to the back, neck, shoulder, or arm  · Frequent vomiting (cant keep down liquids)  · Blood in the stool or vomit (red or black in color)  · Feeling weak or dizzy  · Fever of 100.4ºF (38ºC) or higher, or as directed by your healthcare provider  Date Last Reviewed: 6/22/2015 © 2000-2016 [employer]. [address] [place] [address], [place] [address]. All rights reserved. This information is not intended as a substitute for professional medical care. Always follow your healthcare professional's instructions.        Treating Gastritis     Take your medicines as directed, even if your stomach pain goes away.    A medical evaluation will be done to find out the cause of your symptoms. The evaluation may include your health history, a physical exam, and some tests. Once your evaluation is done, treatment can begin. It may include taking certain medicines and making some lifestyle changes. Follow your healthcare providers advice.  Taking medicines  Your healthcare providers may prescribe some medicines to neutralize or reduce excess stomach acids. If tests show that H. pylori are in your stomach lining, antibiotics may be prescribed. H.pylori are a type of bacteria that can cause gastritis.  Avoiding certain things  Be sure to avoid:  · Aspirin. Avoid taking aspirin and other anti-inflammatory medicines, such as ibuprofen. They can irritate your stomach lining. Also, check with your healthcare provider before taking or stopping any medicines.  · Spicy foods and caffeine. Stay away from foods prepared with spices, especially black pepper. Caffeine can also make your symptoms worse. So, avoid coffee, tea, cola drinks, and chocolate. Be sure to tell your healthcare provider about any other foods or liquids that bother your stomach.  · Tobacco and alcohol. Dont use tobacco or drink alcohol. Tobacco and alcohol can increase stomach acids  and worsen your gastritis symptoms.  Reducing your stress  Stress may make your gastritis symptoms worse. Whenever you can, reduce the stress in your life. One way to do this is to start an exercise program--talk to your healthcare provider first. Also, try to get enough sleep, at least 8 hours a night.  Date Last Reviewed: 7/1/2016  © 2188-3116 [employer]. [address] [place] [address], [place] [address]. All rights reserved. This information is not intended as a substitute for professional medical care. Always follow your healthcare professional's instructions.

## 2017-08-10 NOTE — LETTER
August 10, 2017      Lucero Gramajo MD  [address]  [facility] [address]           [facility]  [address] [facility] [address] [place] [address]  Phone: [phone]          Patient: Roby Rodriguez   MR Number: [id number]   YOB: 1930   Date of Visit: 8/10/2017       Dear Dr. Lucero Gramajo:    Thank you for referring Roby Rodriguez to me for evaluation. Attached you will find relevant portions of my assessment and plan of care.    If you have questions, please do not hesitate to call me. I look forward to following Roby Rodriguez along with you.    Sincerely,    Bronwyn Cornejo, [facility]    Enclosure  CC:  No Recipients    If you would like to receive this communication electronically, please contact [email or web address] or [phone] to request more information on [employer] access.    For providers and/or their staff who would like to refer a patient to [facility], please contact us through our one-stop-shop provider referral line, [facility]ay, at [phone].    If you feel you have received this communication in error or would no longer like to receive these types of communications, please e-mail [email or web address]

## 2017-08-11 ENCOUNTER — DOCUMENTATION ONLY (OUTPATIENT)
Dept: GASTROENTEROLOGY | Facility: CLINIC | Age: 82
End: 2017-08-11

## 2017-08-11 ENCOUNTER — PATIENT MESSAGE (OUTPATIENT)
Dept: INTERNAL MEDICINE | Facility: CLINIC | Age: 82
End: 2017-08-11

## 2017-08-11 VITALS
DIASTOLIC BLOOD PRESSURE: 68 MMHG | BODY MASS INDEX: 26.39 KG/M2 | OXYGEN SATURATION: 99 % | SYSTOLIC BLOOD PRESSURE: 110 MMHG | RESPIRATION RATE: 18 BRPM | HEART RATE: 64 BPM | WEIGHT: 200 LBS

## 2017-08-11 NOTE — PROGRESS NOTES
Reviewed medical records received from [facility], summarized below and in medical & surgical history (endoscopies, etc), & given to nurse to be scanned into system:   Reviewed discharge summary, consult notes and history & physical; 7/31/17 EGD; blood work reviewed   Recommendations:  Continue with previous recommendations.

## 2017-08-14 ENCOUNTER — TELEPHONE (OUTPATIENT)
Dept: INTERNAL MEDICINE | Facility: CLINIC | Age: 82
End: 2017-08-14

## 2017-08-14 NOTE — TELEPHONE ENCOUNTER
Called Mr Rodriguez's mobile.  No answer.  Left voicemail saying that lower extremity ultrasound was negative for clots.  We recommend continuing compression stockings for venous insufficiency.  Advised to give us a call back for any questions.

## 2017-08-31 ENCOUNTER — TELEPHONE (OUTPATIENT)
Dept: ELECTROPHYSIOLOGY | Facility: CLINIC | Age: 82
End: 2017-08-31

## 2017-09-14 ENCOUNTER — HOME CARE VISIT (OUTPATIENT)
Dept: NEUROLOGY | Facility: HOSPITAL | Age: 82
End: 2017-09-14

## 2017-09-14 VITALS
BODY MASS INDEX: 25.73 KG/M2 | DIASTOLIC BLOOD PRESSURE: 64 MMHG | HEART RATE: 66 BPM | RESPIRATION RATE: 20 BRPM | SYSTOLIC BLOOD PRESSURE: 118 MMHG | OXYGEN SATURATION: 98 % | WEIGHT: 195 LBS

## 2017-09-18 RX ORDER — GUANFACINE 1 MG/1
TABLET ORAL
Qty: 90 TABLET | Refills: 0 | Status: SHIPPED | OUTPATIENT
Start: 2017-09-18 | End: 2017-12-15

## 2017-09-21 ENCOUNTER — CLINICAL SUPPORT (OUTPATIENT)
Dept: ELECTROPHYSIOLOGY | Facility: CLINIC | Age: 82
End: 2017-09-21
Payer: MEDICARE

## 2017-09-21 DIAGNOSIS — I49.5 TACHY-BRADY SYNDROME: ICD-10-CM

## 2017-09-21 DIAGNOSIS — I48.91 A-FIB: ICD-10-CM

## 2017-09-21 DIAGNOSIS — R00.1 BRADYCARDIA: ICD-10-CM

## 2017-09-21 DIAGNOSIS — Z95.0 PACEMAKER: Primary | ICD-10-CM

## 2017-09-21 DIAGNOSIS — Z95.0 PACEMAKER: ICD-10-CM

## 2017-09-21 DIAGNOSIS — I47.10 SVT (SUPRAVENTRICULAR TACHYCARDIA): ICD-10-CM

## 2017-09-21 DIAGNOSIS — I48.0 PAROXYSMAL ATRIAL FIBRILLATION: ICD-10-CM

## 2017-09-21 DIAGNOSIS — I49.1 PREMATURE ATRIAL CONTRACTION: ICD-10-CM

## 2017-09-21 PROCEDURE — 93294 REM INTERROG EVL PM/LDLS PM: CPT | Mod: ,,, | Performed by: INTERNAL MEDICINE

## 2017-09-21 PROCEDURE — 93296 REM INTERROG EVL PM/IDS: CPT | Mod: PBBFAC | Performed by: INTERNAL MEDICINE

## 2017-10-21 DIAGNOSIS — I15.8 OTHER SECONDARY HYPERTENSION: ICD-10-CM

## 2017-10-23 RX ORDER — METOPROLOL SUCCINATE 50 MG/1
TABLET, EXTENDED RELEASE ORAL
Qty: 90 TABLET | Refills: 0 | Status: SHIPPED | OUTPATIENT
Start: 2017-10-23 | End: 2018-01-08 | Stop reason: SDUPTHER

## 2017-10-27 ENCOUNTER — HOME CARE VISIT (OUTPATIENT)
Dept: NEUROLOGY | Facility: HOSPITAL | Age: 82
End: 2017-10-27

## 2017-11-07 ENCOUNTER — TELEPHONE (OUTPATIENT)
Dept: ELECTROPHYSIOLOGY | Facility: CLINIC | Age: 82
End: 2017-11-07

## 2017-11-07 NOTE — TELEPHONE ENCOUNTER
I called patients wife and asked her to send a remote pacemaker transmission. She informed me that her daughter will send this at 5:30pm today.

## 2017-11-08 ENCOUNTER — OFFICE VISIT (OUTPATIENT)
Dept: INTERNAL MEDICINE | Facility: CLINIC | Age: 82
End: 2017-11-08
Payer: MEDICARE

## 2017-11-08 ENCOUNTER — OFFICE VISIT (OUTPATIENT)
Dept: ELECTROPHYSIOLOGY | Facility: CLINIC | Age: 82
End: 2017-11-08
Payer: MEDICARE

## 2017-11-08 ENCOUNTER — HOSPITAL ENCOUNTER (OUTPATIENT)
Dept: CARDIOLOGY | Facility: CLINIC | Age: 82
Discharge: HOME | End: 2017-11-08
Payer: MEDICARE

## 2017-11-08 ENCOUNTER — IMMUNIZATION (OUTPATIENT)
Dept: INTERNAL MEDICINE | Facility: CLINIC | Age: 82
End: 2017-11-08
Payer: MEDICARE

## 2017-11-08 VITALS
DIASTOLIC BLOOD PRESSURE: 94 MMHG | WEIGHT: 196.19 LBS | HEART RATE: 70 BPM | SYSTOLIC BLOOD PRESSURE: 136 MMHG | HEIGHT: 74 IN | BODY MASS INDEX: 25.18 KG/M2

## 2017-11-08 VITALS
BODY MASS INDEX: 25.83 KG/M2 | WEIGHT: 194.88 LBS | DIASTOLIC BLOOD PRESSURE: 62 MMHG | HEIGHT: 73 IN | SYSTOLIC BLOOD PRESSURE: 136 MMHG | HEART RATE: 85 BPM

## 2017-11-08 DIAGNOSIS — Z79.01 LONG TERM CURRENT USE OF ANTICOAGULANT THERAPY: ICD-10-CM

## 2017-11-08 DIAGNOSIS — I49.5 TACHY-BRADY SYNDROME: ICD-10-CM

## 2017-11-08 DIAGNOSIS — I49.1 PREMATURE ATRIAL COMPLEXES: ICD-10-CM

## 2017-11-08 DIAGNOSIS — I47.10 SVT (SUPRAVENTRICULAR TACHYCARDIA): ICD-10-CM

## 2017-11-08 DIAGNOSIS — R00.1 BRADYCARDIA: Primary | ICD-10-CM

## 2017-11-08 DIAGNOSIS — I49.3 PREMATURE VENTRICULAR CONTRACTIONS (PVCS) (VPCS): ICD-10-CM

## 2017-11-08 DIAGNOSIS — I48.0 PAROXYSMAL ATRIAL FIBRILLATION: ICD-10-CM

## 2017-11-08 DIAGNOSIS — I63.442 EMBOLIC STROKE INVOLVING LEFT CEREBELLAR ARTERY: ICD-10-CM

## 2017-11-08 DIAGNOSIS — K27.9 PUD (PEPTIC ULCER DISEASE): ICD-10-CM

## 2017-11-08 DIAGNOSIS — I10 ESSENTIAL HYPERTENSION: Primary | ICD-10-CM

## 2017-11-08 DIAGNOSIS — I10 ESSENTIAL HYPERTENSION: ICD-10-CM

## 2017-11-08 PROCEDURE — 99213 OFFICE O/P EST LOW 20 MIN: CPT | Mod: S$PBB,,, | Performed by: INTERNAL MEDICINE

## 2017-11-08 PROCEDURE — 93005 ELECTROCARDIOGRAM TRACING: CPT | Mod: PBBFAC | Performed by: INTERNAL MEDICINE

## 2017-11-08 PROCEDURE — 99999 PR PBB SHADOW E&M-EST. PATIENT-LVL III: CPT | Mod: PBBFAC,,, | Performed by: INTERNAL MEDICINE

## 2017-11-08 PROCEDURE — 99213 OFFICE O/P EST LOW 20 MIN: CPT | Mod: PBBFAC | Performed by: INTERNAL MEDICINE

## 2017-11-08 PROCEDURE — 93010 ELECTROCARDIOGRAM REPORT: CPT | Mod: S$PBB,,, | Performed by: INTERNAL MEDICINE

## 2017-11-08 PROCEDURE — 99214 OFFICE O/P EST MOD 30 MIN: CPT | Mod: S$PBB,,, | Performed by: INTERNAL MEDICINE

## 2017-11-08 PROCEDURE — 99213 OFFICE O/P EST LOW 20 MIN: CPT | Mod: PBBFAC,27,25 | Performed by: INTERNAL MEDICINE

## 2017-11-08 PROCEDURE — G0008 ADMIN INFLUENZA VIRUS VAC: HCPCS | Mod: PBBFAC

## 2017-11-08 RX ORDER — PANTOPRAZOLE SODIUM 40 MG/1
40 TABLET, DELAYED RELEASE ORAL DAILY
Qty: 30 TABLET | Refills: 0 | Status: SHIPPED | OUTPATIENT
Start: 2017-11-08 | End: 2017-11-30 | Stop reason: SDUPTHER

## 2017-11-08 NOTE — PROGRESS NOTES
Subjective:    Patient ID:  Roby Rodriguez is a 87 y.o. male who presents for follow-up of Tachy Kunal Syndrome      HPI 88 yo male with Htn, PAC's, PVC's, tachy-kunal syndrome, PPM, CVA.     H/o recurrent dizziness, and atrial arrhythmias.  Holter monitor obtained, revealed nsr with >1900 PVC's and > 4500 PAC's, with runs of nonsustained AT   Event monitor: one symptomatic transmission. Revealed nsr >> atrial fibrillation. F/u transmission one hour later revealed nsr. Placed on Toprol 25 mg daily. Significant decrease in burden of ectopy and felt better.   Dual chamber PPM implanted 7/22/13.   Had an episode of syncope 1/14, while standing. Device interrogation was negative.   Subsequent device interrogation revealed atrial fibrillation with RVR. Toprol increased, coumadin initiated. Noted significant improvement in terms of dizziness and energy level.  Presented 3/31/17 with stroke.  Thought to be cardio-embolic in origin.  INR subtherapeutic at 1.7 upon admission.  Aspirin added.  We discontinued aspirin and coumadin, initiated Xarelto.  Device interrogations reveal stable function of leads, 93% RA pacing 1% RV pacing no significant arrhythmias.  Doing well.  Denies syncope, palpitations, dizziness, shortness of breath.  Still cuts the grass occasionally.  Taking Xarelto at night, not after/with dinner.    Review of Systems   Constitution: Negative. Negative for weakness and malaise/fatigue.   Cardiovascular: Negative for chest pain, dyspnea on exertion, irregular heartbeat, leg swelling, near-syncope, orthopnea, palpitations, paroxysmal nocturnal dyspnea and syncope.   Respiratory: Negative for cough and shortness of breath.    Neurological: Negative for dizziness and light-headedness.   All other systems reviewed and are negative.       Objective:    Physical Exam   Constitutional: He is oriented to person, place, and time. He appears well-developed and well-nourished.   Eyes: Conjunctivae are normal. No  scleral icterus.   Neck: No JVD present. No tracheal deviation present.   Cardiovascular: Normal rate, regular rhythm and normal heart sounds.  PMI is not displaced.    Pulmonary/Chest: Effort normal and breath sounds normal. No respiratory distress.   Abdominal: Soft. There is no hepatosplenomegaly. There is no tenderness.   Musculoskeletal: He exhibits no edema (lower extremity) or tenderness.   Neurological: He is alert and oriented to person, place, and time.   Skin: Skin is warm and dry. No rash noted.   Psychiatric: He has a normal mood and affect. His behavior is normal.         Assessment:       1. Bradycardia    2. Essential hypertension    3. Paroxysmal atrial fibrillation    4. Premature atrial complexes    5. Premature ventricular contractions (PVCs) (VPCs)    6. 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  Continue current settings and medications.  F/u in device clinic as scheduled, with me in 6 months.

## 2017-11-08 NOTE — PROGRESS NOTES
Subjective:       Patient ID: Roby Rodriguez is a 87 y.o. male.    Chief Complaint: Follow-up    HPI the patient is an 87-year-old male comes in for follow-up of his hypertension and other medical problems.  He is not noticing any chest pain.  However he does report difficulty with burning in his stomach and heartburn.  He does have a past history of peptic ulcer disease with significant bleeding.  He has not noticed any blood in his stool or black stools.  He is no longer on Protonix or Carafate.  Review of Systems   Constitutional: Negative.  Negative for activity change and unexpected weight change.   Respiratory: Negative for chest tightness and shortness of breath.    Cardiovascular: Negative for chest pain and palpitations.   Gastrointestinal: Positive for abdominal pain.       Objective:      Physical Exam   Constitutional: He appears well-developed and well-nourished. No distress.   Cardiovascular: Normal rate, regular rhythm and normal heart sounds.  Exam reveals no gallop and no friction rub.    No murmur heard.  Occasional extrasystole   Pulmonary/Chest: Effort normal and breath sounds normal. No respiratory distress. He has no wheezes. He has no rales.   Skin: He is not diaphoretic.   Vitals reviewed.      Assessment:       1. Essential hypertension    2. Long term current use of anticoagulant therapy    3. Paroxysmal atrial fibrillation    4. PUD (peptic ulcer disease)        Plan:       1.  Protonix 40 mg by mouth daily  Normal 2.  Flu vaccine  3.  CBC, iron, TIBC  4.  Return to clinic in 6 months  5.  The patient's situation was discussed with his daughter, Erika

## 2017-11-13 ENCOUNTER — PATIENT MESSAGE (OUTPATIENT)
Dept: INTERNAL MEDICINE | Facility: CLINIC | Age: 82
End: 2017-11-13

## 2017-11-30 RX ORDER — PANTOPRAZOLE SODIUM 40 MG/1
40 TABLET, DELAYED RELEASE ORAL DAILY
Qty: 90 TABLET | Refills: 0 | Status: SHIPPED | OUTPATIENT
Start: 2017-11-30 | End: 2017-12-04 | Stop reason: SDUPTHER

## 2017-12-01 ENCOUNTER — HOME CARE VISIT (OUTPATIENT)
Dept: NEUROLOGY | Facility: HOSPITAL | Age: 82
End: 2017-12-01

## 2017-12-03 ENCOUNTER — PATIENT MESSAGE (OUTPATIENT)
Dept: INTERNAL MEDICINE | Facility: CLINIC | Age: 82
End: 2017-12-03

## 2017-12-04 RX ORDER — PANTOPRAZOLE SODIUM 40 MG/1
40 TABLET, DELAYED RELEASE ORAL DAILY
Qty: 90 TABLET | Refills: 0 | Status: SHIPPED | OUTPATIENT
Start: 2017-12-04 | End: 2018-03-02 | Stop reason: SDUPTHER

## 2017-12-06 VITALS
DIASTOLIC BLOOD PRESSURE: 64 MMHG | WEIGHT: 199 LBS | BODY MASS INDEX: 26.25 KG/M2 | RESPIRATION RATE: 20 BRPM | HEART RATE: 70 BPM | SYSTOLIC BLOOD PRESSURE: 118 MMHG | OXYGEN SATURATION: 99 %

## 2017-12-14 ENCOUNTER — OFFICE VISIT (OUTPATIENT)
Dept: DERMATOLOGY | Facility: CLINIC | Age: 82
End: 2017-12-14
Payer: MEDICARE

## 2017-12-14 DIAGNOSIS — L57.0 MULTIPLE ACTINIC KERATOSES: Primary | ICD-10-CM

## 2017-12-14 DIAGNOSIS — L72.0 EIC (EPIDERMAL INCLUSION CYST): ICD-10-CM

## 2017-12-14 DIAGNOSIS — L82.1 SK (SEBORRHEIC KERATOSIS): ICD-10-CM

## 2017-12-14 DIAGNOSIS — Z85.828 HISTORY OF NONMELANOMA SKIN CANCER: ICD-10-CM

## 2017-12-14 PROCEDURE — 17003 DESTRUCT PREMALG LES 2-14: CPT | Mod: S$PBB,,, | Performed by: PATHOLOGY

## 2017-12-14 PROCEDURE — 99213 OFFICE O/P EST LOW 20 MIN: CPT | Mod: 25,S$PBB,, | Performed by: PATHOLOGY

## 2017-12-14 PROCEDURE — 17003 DESTRUCT PREMALG LES 2-14: CPT | Mod: PBBFAC | Performed by: PATHOLOGY

## 2017-12-14 PROCEDURE — 17000 DESTRUCT PREMALG LESION: CPT | Mod: S$PBB,,, | Performed by: PATHOLOGY

## 2017-12-14 PROCEDURE — 99212 OFFICE O/P EST SF 10 MIN: CPT | Mod: PBBFAC | Performed by: PATHOLOGY

## 2017-12-14 PROCEDURE — 99999 PR PBB SHADOW E&M-EST. PATIENT-LVL II: CPT | Mod: PBBFAC,,, | Performed by: PATHOLOGY

## 2017-12-14 PROCEDURE — 17000 DESTRUCT PREMALG LESION: CPT | Mod: PBBFAC | Performed by: PATHOLOGY

## 2017-12-14 NOTE — PROGRESS NOTES
Subjective:       Patient ID:  Roby Rodriguez is a 87 y.o. male who presents for   Chief Complaint   Patient presents with    Skin Check     UBSE     HPI  Pt with h/o multiple NMSCs, most recently seen by Dr. Vitale in March 2017 with 2 biopsies that visit:  Left zygoma s/p Mohs.  FINAL PATHOLOGIC DIAGNOSIS  1. Skin, left zygoma, shave biopsy:  - INVASIVE SQUAMOUS CELL CARCINOMA.  - THE LESION IS TRANSECTED AT THE BASE.  MICROSCOPIC DESCRIPTION: Sections show a proliferation of squamous cells exhibiting atypia and infiltrating  within the dermis.  2. Skin, left helical rim, shave biopsy:  - SQUAMOUS CELL CARCINOMA IN SITU.  - NEGATIVE BIOPSY MARGINS IN PLANES OF SECTION EXAMINED.  MICROSCOPIC DESCRIPTION: Sections show epidermis with full-thickness atypia, parakeratosis and variable  epidermal maturation. Dermal involvement is not seen.    Pt with multiple persistent pink, scaly papules to scalp, face, forearms.      Review of Systems   Constitutional: Negative for fever, chills, weight loss, weight gain, fatigue, night sweats and malaise.   Skin: Negative for itching, rash, daily sunscreen use and recent sunburn.   Hematologic/Lymphatic: Bruises/bleeds easily.        Objective:    Physical Exam   Constitutional: He appears well-developed and well-nourished.   Neurological: He is alert.   Skin:   Areas Examined (abnormalities noted in diagram):   Scalp / Hair Palpated and Inspected  Head / Face Inspection Performed  Neck Inspection Performed  Chest / Axilla Inspection Performed  Abdomen Inspection Performed  Back Inspection Performed  RUE Inspected  LUE Inspection Performed                   Diagram Legend     Erythematous scaling macule/papule c/w actinic keratosis       Vascular papule c/w angioma      Pigmented verrucoid papule/plaque c/w seborrheic keratosis      Yellow umbilicated papule c/w sebaceous hyperplasia      Irregularly shaped tan macule c/w lentigo     1-2 mm smooth white papules consistent  with Milia      Movable subcutaneous cyst with punctum c/w epidermal inclusion cyst      Subcutaneous movable cyst c/w pilar cyst      Firm pink to brown papule c/w dermatofibroma      Pedunculated fleshy papule(s) c/w skin tag(s)      Evenly pigmented macule c/w junctional nevus     Mildly variegated pigmented, slightly irregular-bordered macule c/w mildly atypical nevus      Flesh colored to evenly pigmented papule c/w intradermal nevus       Pink pearly papule/plaque c/w basal cell carcinoma      Erythematous hyperkeratotic cursted plaque c/w SCC      Surgical scar with no sign of skin cancer recurrence      Open and closed comedones      Inflammatory papules and pustules      Verrucoid papule consistent consistent with wart     Erythematous eczematous patches and plaques     Dystrophic onycholytic nail with subungual debris c/w onychomycosis     Umbilicated papule    Erythematous-base heme-crusted tan verrucoid plaque consistent with inflamed seborrheic keratosis     Erythematous Silvery Scaling Plaque c/w Psoriasis     See annotation      Assessment / Plan:        Multiple actinic keratoses  -     Photodynamic Therapy; Future - scalp, temples, nose, forehead 100 minutes incubation  -     Photodynamic Therapy; Future - dorsal hands and forearms - 105 minutes incubation    Cryosurgery Procedure Note    Verbal consent from the patient is obtained and the patient is aware of the precancerous quality and need for treatment of these lesions. Liquid nitrogen cryosurgery is applied to the 11 actinic keratoses, as detailed in the physical exam, to produce a freeze injury. The patient is aware that blisters may form and is instructed on wound care with gentle cleansing and use of vaseline ointment to keep moist until healed. The patient is supplied a handout on cryosurgery and is instructed to call if lesions do not completely resolve.          History of nonmelanoma skin cancer - Area(s) of previous NMSC evaluated with  no signs of recurrence.    Upper body skin examination performed today including at least 6 points as noted in physical examination. No lesions suspicious for malignancy noted.      EIC (epidermal inclusion cyst) - Reassurance given to patient. No treatment is necessary.       SK (seborrheic keratosis) - These are benign inherited growths without a malignant potential. Reassurance given to patient. No treatment is necessary.                No Follow-up on file.

## 2017-12-15 RX ORDER — GUANFACINE 1 MG/1
TABLET ORAL
Qty: 90 TABLET | Refills: 0 | Status: SHIPPED | OUTPATIENT
Start: 2017-12-15 | End: 2018-03-09

## 2017-12-21 ENCOUNTER — CLINICAL SUPPORT (OUTPATIENT)
Dept: CARDIOLOGY | Facility: CLINIC | Age: 82
End: 2017-12-21
Attending: INTERNAL MEDICINE
Payer: MEDICARE

## 2017-12-21 DIAGNOSIS — Z95.0 PACEMAKER: ICD-10-CM

## 2017-12-21 DIAGNOSIS — I48.0 PAROXYSMAL ATRIAL FIBRILLATION: ICD-10-CM

## 2017-12-21 DIAGNOSIS — R00.1 BRADYCARDIA: ICD-10-CM

## 2017-12-21 DIAGNOSIS — I49.5 TACHY-BRADY SYNDROME: ICD-10-CM

## 2017-12-21 PROCEDURE — 93280 PM DEVICE PROGR EVAL DUAL: CPT | Mod: PBBFAC,PO | Performed by: INTERNAL MEDICINE

## 2018-01-03 VITALS
SYSTOLIC BLOOD PRESSURE: 110 MMHG | RESPIRATION RATE: 20 BRPM | BODY MASS INDEX: 25.64 KG/M2 | OXYGEN SATURATION: 99 % | DIASTOLIC BLOOD PRESSURE: 64 MMHG | HEART RATE: 62 BPM | WEIGHT: 197 LBS

## 2018-01-05 ENCOUNTER — HOME CARE VISIT (OUTPATIENT)
Dept: NEUROLOGY | Facility: HOSPITAL | Age: 83
End: 2018-01-05

## 2018-01-08 DIAGNOSIS — I15.8 OTHER SECONDARY HYPERTENSION: ICD-10-CM

## 2018-01-08 RX ORDER — METOPROLOL SUCCINATE 50 MG/1
TABLET, EXTENDED RELEASE ORAL
Qty: 90 TABLET | Refills: 0 | Status: SHIPPED | OUTPATIENT
Start: 2018-01-08 | End: 2018-06-25 | Stop reason: SDUPTHER

## 2018-01-09 VITALS
OXYGEN SATURATION: 99 % | BODY MASS INDEX: 25.64 KG/M2 | HEART RATE: 64 BPM | WEIGHT: 197 LBS | SYSTOLIC BLOOD PRESSURE: 116 MMHG | RESPIRATION RATE: 20 BRPM | DIASTOLIC BLOOD PRESSURE: 72 MMHG

## 2018-01-11 ENCOUNTER — CLINICAL SUPPORT (OUTPATIENT)
Dept: DERMATOLOGY | Facility: CLINIC | Age: 83
End: 2018-01-11
Payer: MEDICARE

## 2018-01-11 DIAGNOSIS — L57.0 MULTIPLE ACTINIC KERATOSES: ICD-10-CM

## 2018-01-11 PROCEDURE — 99499 UNLISTED E&M SERVICE: CPT | Mod: S$PBB,,, | Performed by: PATHOLOGY

## 2018-01-11 PROCEDURE — 99212 OFFICE O/P EST SF 10 MIN: CPT | Mod: PBBFAC,25

## 2018-01-11 PROCEDURE — 96567 PDT DSTR PRMLG LES SKN: CPT | Mod: PBBFAC

## 2018-01-11 PROCEDURE — 99999 PR PBB SHADOW E&M-EST. PATIENT-LVL II: CPT | Mod: PBBFAC,,,

## 2018-01-11 PROCEDURE — 96372 THER/PROPH/DIAG INJ SC/IM: CPT | Mod: PBBFAC

## 2018-01-11 NOTE — PROGRESS NOTES
Photodynamic Therapy Note.    PDT ordered per Dr. Julian    Patient here today for treatment of actinic keratoses using photodynamic therapy.  Risks including but not limited to burning, stinging, redness, swelling, crusting or blistering of the skin of the area treated were discussed with patient.  Patient elects to proceed with photodynamic therapy.    Treatment area:  Scalp, forehead & temples  Treatment area cleaned with rubbing alcohol, Levulan Kerastick (1) applied evenly to entire surface and allowed to absorb for 100 minutes.  Patient then placed under Roberto-U light for 16 minutes 40 seconds.    Patient tolerated treatment well with only mild but tolerable symptoms of discomfort.  Area washed gently with mild soap and water; Zinc oxide sunscreen applied.  Patient advised to avoid any significant light exposure (sun and artificial) for next 48 hours.    RTC:  In 1 month or sooner if concern arises.

## 2018-01-25 ENCOUNTER — CLINICAL SUPPORT (OUTPATIENT)
Dept: DERMATOLOGY | Facility: CLINIC | Age: 83
End: 2018-01-25
Payer: MEDICARE

## 2018-01-25 DIAGNOSIS — L57.0 MULTIPLE ACTINIC KERATOSES: ICD-10-CM

## 2018-01-25 PROCEDURE — 99499 UNLISTED E&M SERVICE: CPT | Mod: S$PBB,,, | Performed by: PATHOLOGY

## 2018-01-25 PROCEDURE — 99212 OFFICE O/P EST SF 10 MIN: CPT | Mod: PBBFAC,25

## 2018-01-25 PROCEDURE — 99999 PR PBB SHADOW E&M-EST. PATIENT-LVL II: CPT | Mod: PBBFAC,,,

## 2018-01-25 PROCEDURE — 96372 THER/PROPH/DIAG INJ SC/IM: CPT | Mod: PBBFAC

## 2018-01-25 PROCEDURE — 96567 PDT DSTR PRMLG LES SKN: CPT | Mod: PBBFAC

## 2018-01-25 NOTE — PROGRESS NOTES
Photodynamic Therapy Note.    PDT ordered per Dr. Julian    Patient here today for treatment of actinic keratoses using photodynamic therapy.  Risks including but not limited to burning, stinging, redness, swelling, crusting or blistering of the skin of the area treated were discussed with patient.  Patient elects to proceed with photodynamic therapy.    Treatment area:  Forearms & hands  Treatment area cleaned with rubbing alcohol, Levulan Kerastick (2) applied evenly to entire surface and allowed to absorb for 105 minutes.  Patient then placed under Roberto-U light for 16 minutes 40 seconds.    Patient tolerated treatment well with only mild but tolerable symptoms of discomfort.  Area washed gently with mild soap and water; Zinc oxide sunscreen applied.  Patient advised to avoid any significant light exposure (sun and artificial) for next 48 hours.    RTC:  In 1 month or sooner if concern arises.

## 2018-02-02 ENCOUNTER — HOME CARE VISIT (OUTPATIENT)
Dept: NEUROLOGY | Facility: HOSPITAL | Age: 83
End: 2018-02-02

## 2018-02-02 VITALS
SYSTOLIC BLOOD PRESSURE: 112 MMHG | OXYGEN SATURATION: 99 % | BODY MASS INDEX: 25.9 KG/M2 | RESPIRATION RATE: 20 BRPM | DIASTOLIC BLOOD PRESSURE: 74 MMHG | WEIGHT: 199 LBS | HEART RATE: 64 BPM

## 2018-02-19 ENCOUNTER — RESEARCH ENCOUNTER (OUTPATIENT)
Dept: RESEARCH | Facility: HOSPITAL | Age: 83
End: 2018-02-19
Payer: MEDICARE

## 2018-02-19 NOTE — PROGRESS NOTES
Subject came to clinic today for the SPRINT ASK study (IRB #[id number] PI: Jose). The study was explained to the patient in detail, and the Informed Consent was reviewed and discussed with the patient. All risks, benefits, procedures were discussed. Adequate time was given for the patient to ask questions and receive answers. Patient confirmed that all questions had been answered and verbalized desire to participate in study, understanding that participation is voluntary and can withdraw at any time. Patient signed Informed Consent, and a signed copy was provided.This Informed Consent process was conducted prior to initiation of any study procedures.    Vitals were obtained.  BP:112/58  Pulse: 62    Questionnaires and cognitive testing were done per protocol.     Non-fasting blood work was drawn, processed and shipped to the trial laboratory.      The subject tolerated all procedures well.

## 2018-03-02 ENCOUNTER — PATIENT MESSAGE (OUTPATIENT)
Dept: INTERNAL MEDICINE | Facility: CLINIC | Age: 83
End: 2018-03-02

## 2018-03-02 RX ORDER — PANTOPRAZOLE SODIUM 40 MG/1
40 TABLET, DELAYED RELEASE ORAL DAILY
Qty: 90 TABLET | Refills: 0 | Status: SHIPPED | OUTPATIENT
Start: 2018-03-02 | End: 2018-03-10 | Stop reason: SDUPTHER

## 2018-03-09 ENCOUNTER — HOME CARE VISIT (OUTPATIENT)
Dept: NEUROLOGY | Facility: HOSPITAL | Age: 83
End: 2018-03-09

## 2018-03-09 RX ORDER — GUANFACINE 1 MG/1
TABLET ORAL
Qty: 90 TABLET | Refills: 0 | Status: SHIPPED | OUTPATIENT
Start: 2018-03-09 | End: 2018-05-27 | Stop reason: SDUPTHER

## 2018-03-12 RX ORDER — PANTOPRAZOLE SODIUM 40 MG/1
TABLET, DELAYED RELEASE ORAL
Qty: 90 TABLET | Refills: 0 | Status: SHIPPED | OUTPATIENT
Start: 2018-03-12 | End: 2018-08-22 | Stop reason: SDUPTHER

## 2018-03-14 VITALS
OXYGEN SATURATION: 98 % | BODY MASS INDEX: 25.77 KG/M2 | DIASTOLIC BLOOD PRESSURE: 68 MMHG | HEART RATE: 64 BPM | RESPIRATION RATE: 20 BRPM | SYSTOLIC BLOOD PRESSURE: 114 MMHG | WEIGHT: 198 LBS

## 2018-03-27 ENCOUNTER — CLINICAL SUPPORT (OUTPATIENT)
Dept: CARDIOLOGY | Facility: CLINIC | Age: 83
End: 2018-03-27
Attending: INTERNAL MEDICINE
Payer: MEDICARE

## 2018-03-27 DIAGNOSIS — I48.91 A-FIB: ICD-10-CM

## 2018-03-27 DIAGNOSIS — I47.10 SVT (SUPRAVENTRICULAR TACHYCARDIA): ICD-10-CM

## 2018-03-27 DIAGNOSIS — I49.1 PREMATURE ATRIAL CONTRACTION: ICD-10-CM

## 2018-03-27 DIAGNOSIS — Z95.0 PACEMAKER: ICD-10-CM

## 2018-03-27 PROCEDURE — 93296 REM INTERROG EVL PM/IDS: CPT | Mod: PBBFAC,PO | Performed by: INTERNAL MEDICINE

## 2018-03-27 PROCEDURE — 93294 REM INTERROG EVL PM/LDLS PM: CPT | Mod: ,,, | Performed by: INTERNAL MEDICINE

## 2018-04-05 ENCOUNTER — HOME CARE VISIT (OUTPATIENT)
Dept: NEUROLOGY | Facility: HOSPITAL | Age: 83
End: 2018-04-05

## 2018-04-09 VITALS
BODY MASS INDEX: 26.03 KG/M2 | RESPIRATION RATE: 20 BRPM | OXYGEN SATURATION: 98 % | DIASTOLIC BLOOD PRESSURE: 84 MMHG | HEART RATE: 84 BPM | WEIGHT: 200 LBS | SYSTOLIC BLOOD PRESSURE: 128 MMHG

## 2018-04-09 NOTE — PROGRESS NOTES
Mr. Rodriguez doing really well very high functioning mentally and physically, strong family support from daughter and spouse. Patient expressed great appreciation for the Stroke program and stated how much they will miss the Stroke team visits.

## 2018-04-19 ENCOUNTER — OFFICE VISIT (OUTPATIENT)
Dept: DERMATOLOGY | Facility: CLINIC | Age: 83
End: 2018-04-19
Payer: MEDICARE

## 2018-04-19 DIAGNOSIS — Z92.89: ICD-10-CM

## 2018-04-19 DIAGNOSIS — Z85.828 HISTORY OF MALIGNANT NEOPLASM OF SKIN: ICD-10-CM

## 2018-04-19 DIAGNOSIS — D48.5 NEOPLASM OF UNCERTAIN BEHAVIOR OF SKIN: ICD-10-CM

## 2018-04-19 DIAGNOSIS — L57.0 ACTINIC KERATOSES: Primary | ICD-10-CM

## 2018-04-19 PROCEDURE — 99212 OFFICE O/P EST SF 10 MIN: CPT | Mod: PBBFAC,25 | Performed by: PATHOLOGY

## 2018-04-19 PROCEDURE — 11100 PR BIOPSY OF SKIN LESION: CPT | Mod: PBBFAC | Performed by: PATHOLOGY

## 2018-04-19 PROCEDURE — 99999 PR PBB SHADOW E&M-EST. PATIENT-LVL II: CPT | Mod: PBBFAC,,, | Performed by: PATHOLOGY

## 2018-04-19 PROCEDURE — 99213 OFFICE O/P EST LOW 20 MIN: CPT | Mod: 25,S$PBB,, | Performed by: PATHOLOGY

## 2018-04-19 PROCEDURE — 17003 DESTRUCT PREMALG LES 2-14: CPT | Mod: S$PBB,,, | Performed by: PATHOLOGY

## 2018-04-19 PROCEDURE — 88305 TISSUE EXAM BY PATHOLOGIST: CPT | Performed by: PATHOLOGY

## 2018-04-19 PROCEDURE — 17000 DESTRUCT PREMALG LESION: CPT | Mod: PBBFAC | Performed by: PATHOLOGY

## 2018-04-19 PROCEDURE — 17000 DESTRUCT PREMALG LESION: CPT | Mod: S$PBB,,, | Performed by: PATHOLOGY

## 2018-04-19 PROCEDURE — 11100 PR BIOPSY OF SKIN LESION: CPT | Mod: 59,S$PBB,, | Performed by: PATHOLOGY

## 2018-04-19 PROCEDURE — 17003 DESTRUCT PREMALG LES 2-14: CPT | Mod: PBBFAC | Performed by: PATHOLOGY

## 2018-04-19 NOTE — PROGRESS NOTES
Subjective:       Patient ID:  Roby Rodriguez is a 87 y.o. male who presents for   Chief Complaint   Patient presents with    Follow-up     PDT       HPI  Pt with h/o multiple NMSCs, most recently seen by Dr. Julian  12/2017. At last appointment had numerous Aks and was set up for PDT, which he completed 1/25/2018. Did have mild erytema, no blistering. Has no new bleeding growing or changing lesions.    Most recent skin cancers 3/2017:  Left zygoma s/p Mohs.  FINAL PATHOLOGIC DIAGNOSIS  1. Skin, left zygoma, shave biopsy:  - INVASIVE SQUAMOUS CELL CARCINOMA.  - THE LESION IS TRANSECTED AT THE BASE.  MICROSCOPIC DESCRIPTION: Sections show a proliferation of squamous cells exhibiting atypia and infiltrating  within the dermis.  2. Skin, left helical rim, shave biopsy:  - SQUAMOUS CELL CARCINOMA IN SITU.  - NEGATIVE BIOPSY MARGINS IN PLANES OF SECTION EXAMINED.  MICROSCOPIC DESCRIPTION: Sections show epidermis with full-thickness atypia, parakeratosis and variable  epidermal maturation. Dermal involvement is not seen.    Pt with multiple persistent pink, scaly papules to scalp, face, forearms.          Review of Systems   Constitutional: Negative for fever, chills and fatigue.   Hematologic/Lymphatic: Bruises/bleeds easily.        Objective:    Physical Exam   Constitutional: He appears well-developed and well-nourished.   Neurological: He is alert.   Skin:   Areas Examined (abnormalities noted in diagram):   Scalp / Hair Palpated and Inspected  Head / Face Inspection Performed  Neck Inspection Performed  Chest / Axilla Inspection Performed  Abdomen Inspection Performed  Back Inspection Performed  RUE Inspected  LUE Inspection Performed                   Diagram Legend     Erythematous scaling macule/papule c/w actinic keratosis       Vascular papule c/w angioma      Pigmented verrucoid papule/plaque c/w seborrheic keratosis      Yellow umbilicated papule c/w sebaceous hyperplasia      Irregularly shaped tan  macule c/w lentigo     1-2 mm smooth white papules consistent with Milia      Movable subcutaneous cyst with punctum c/w epidermal inclusion cyst      Subcutaneous movable cyst c/w pilar cyst      Firm pink to brown papule c/w dermatofibroma      Pedunculated fleshy papule(s) c/w skin tag(s)      Evenly pigmented macule c/w junctional nevus     Mildly variegated pigmented, slightly irregular-bordered macule c/w mildly atypical nevus      Flesh colored to evenly pigmented papule c/w intradermal nevus       Pink pearly papule/plaque c/w basal cell carcinoma      Erythematous hyperkeratotic cursted plaque c/w SCC      Surgical scar with no sign of skin cancer recurrence      Open and closed comedones      Inflammatory papules and pustules      Verrucoid papule consistent consistent with wart     Erythematous eczematous patches and plaques     Dystrophic onycholytic nail with subungual debris c/w onychomycosis     Umbilicated papule    Erythematous-base heme-crusted tan verrucoid plaque consistent with inflamed seborrheic keratosis     Erythematous Silvery Scaling Plaque c/w Psoriasis     See annotation      Assessment / Plan:        Actinic keratoses  Cryosurgery Procedure Note    Verbal consent from the patient is obtained and the patient is aware of the precancerous quality and need for treatment of these lesions. Liquid nitrogen cryosurgery is applied to the 13 actinic keratoses, as detailed in the physical exam, to produce a freeze injury. The patient is aware that blisters may form and is instructed on wound care with gentle cleansing and use of vaseline ointment to keep moist until healed. The patient is supplied a handout on cryosurgery and is instructed to call if lesions do not completely resolve.    History of malignant neoplasm of skin  - 1 lesion on exam concerning for potential skin cancer. Is tender.  - Shave biopsy procedure note:    Shave biopsy performed after verbal consent including risk of  infection, scar, recurrence, need for additional treatment of site. Area prepped with alcohol, anesthetized with approximately 1.0cc of 1% lidocaine with epinephrine. Lesional tissue shaved with razor blade. Hemostasis achieved with application of aluminum chloride followed by hyfrecation. No complications. Dressing applied. Wound care explained.      Status post photodynamic therapy  - Improved overall Aks on forehead, scalp, and bilateral arms. Has 13 total Aks today and 1 potential SCC on left forehead.  Patient instructed in importance in daily sun protection of at least spf 30. Sun avoidance and topical protection discussed.   Patient encouraged to wear hat for all outdoor exposure.     Also discussed sun protective clothing.    Cerave for dry skin             No Follow-up on file.

## 2018-04-19 NOTE — PATIENT INSTRUCTIONS
Shave Biopsy Wound Care    Your doctor has performed a shave biopsy today.  A band aid and vaseline ointment has been placed over the site.  This should remain in place for 24 hours.  It is recommended that you keep the area dry for the first 24 hours.  After 24 hours, you may remove the band aid and wash the area with warm soap and water and apply Vaseline jelly.  Many patients prefer to use Neosporin or Bacitracin ointment.  This is acceptable; however, know that you can develop an allergy to this medication even if you have used it safely for years.  It is important to keep the area moist.  Letting it dry out and get air slows healing time, and will worsen the scar.  Band aid is optional after first 24 hours.      If you notice increasing redness, tenderness, pain, or yellow drainage at the biopsy site, please notify your doctor.  These are signs of an infection.    If your biopsy site is bleeding, apply firm pressure for 15 minutes straight.  Repeat for another 15 minutes, if it is still bleeding.   If the surgical site continues to bleed, then please contact your doctor.      [facility] [place] [address]/ [phone] FAX/ [email or web address]        CRYOSURGERY      Your doctor has used a method called cryosurgery to treat your skin condition. Cryosurgery refers to the use of very cold substances to treat a variety of skin conditions such as warts, pre-skin cancers, molluscum contagiosum, sun spots, and several benign growths. The substance we use in cryosurgery is liquid nitrogen and is so cold (-195 degrees Celsius) that is burns when administered.     Following treatment in the office, the skin may immediately burn and become red. You may find the area around the lesion is affected as well. It is sometimes necessary to treat not only the lesion, but a small area of the surrounding normal skin to achieve a good response.     A blister, and even a blood filled blister, may form  after treatment.   This is a normal response. If the blister is painful, it is acceptable to sterilize a needle and with rubbing alcohol and gently pop the blister. It is important that you gently wash the area with soap and warm water as the blister fluid may contain wart virus if a wart was treated. Do no remove the roof of the blister.     The area treated can take anywhere from 1-3 weeks to heal. Healing time depends on the kind skin lesion treated, the location, and how aggressively the lesion was treated. It is recommended that the areas treated are covered with Vaseline or bacitracin ointment and a band-aid. If a band-aid is not practical, just ointment applied several times per day will do. Keeping these areas moist will speed the healing time.    Treatment with liquid nitrogen can leave a scar. In dark skin, it may be a light or dark scar, in light skin it may be a white or pink scar. These will generally fade with time, but may never go away completely.     If you have any concerns after your treatment, please feel free to call the office.       [facility] [place] [address]/ [phone] FAX/ [email or web address]

## 2018-04-25 DIAGNOSIS — L57.0 AK (ACTINIC KERATOSIS): ICD-10-CM

## 2018-04-25 DIAGNOSIS — D04.9 SQUAMOUS CELL CARCINOMA IN SITU (SCCIS) OF SKIN: Primary | ICD-10-CM

## 2018-04-25 RX ORDER — FLUOROURACIL 50 MG/G
CREAM TOPICAL
Qty: 40 G | Refills: 1 | Status: SHIPPED | OUTPATIENT
Start: 2018-04-25 | End: 2018-06-25 | Stop reason: ALTCHOICE

## 2018-05-04 RX ORDER — ATORVASTATIN CALCIUM 40 MG/1
TABLET, FILM COATED ORAL
Qty: 90 TABLET | Refills: 3 | Status: SHIPPED | OUTPATIENT
Start: 2018-05-04 | End: 2019-04-26 | Stop reason: SDUPTHER

## 2018-05-28 RX ORDER — GUANFACINE 1 MG/1
TABLET ORAL
Qty: 90 TABLET | Refills: 0 | Status: SHIPPED | OUTPATIENT
Start: 2018-05-28 | End: 2018-09-02 | Stop reason: SDUPTHER

## 2018-06-04 DIAGNOSIS — I49.5 TACHY-BRADY SYNDROME: ICD-10-CM

## 2018-06-04 DIAGNOSIS — Z95.0 PACEMAKER: Primary | ICD-10-CM

## 2018-06-25 ENCOUNTER — OFFICE VISIT (OUTPATIENT)
Dept: FAMILY MEDICINE | Facility: CLINIC | Age: 83
End: 2018-06-25
Payer: MEDICARE

## 2018-06-25 VITALS
BODY MASS INDEX: 25.67 KG/M2 | SYSTOLIC BLOOD PRESSURE: 110 MMHG | WEIGHT: 200 LBS | HEIGHT: 74 IN | TEMPERATURE: 98 F | HEART RATE: 72 BPM | DIASTOLIC BLOOD PRESSURE: 66 MMHG

## 2018-06-25 DIAGNOSIS — I48.0 PAROXYSMAL ATRIAL FIBRILLATION: ICD-10-CM

## 2018-06-25 DIAGNOSIS — Z95.0 PACEMAKER: ICD-10-CM

## 2018-06-25 DIAGNOSIS — I63.9 CEREBROVASCULAR ACCIDENT (CVA), UNSPECIFIED MECHANISM: ICD-10-CM

## 2018-06-25 DIAGNOSIS — Z87.11 HISTORY OF PEPTIC ULCER DISEASE: ICD-10-CM

## 2018-06-25 DIAGNOSIS — I10 ESSENTIAL HYPERTENSION: Primary | ICD-10-CM

## 2018-06-25 DIAGNOSIS — R60.0 BILATERAL LEG EDEMA: ICD-10-CM

## 2018-06-25 DIAGNOSIS — Z79.01 LONG TERM CURRENT USE OF ANTICOAGULANT THERAPY: ICD-10-CM

## 2018-06-25 DIAGNOSIS — K27.9 PUD (PEPTIC ULCER DISEASE): ICD-10-CM

## 2018-06-25 DIAGNOSIS — R29.898 LEFT LEG WEAKNESS: ICD-10-CM

## 2018-06-25 DIAGNOSIS — Z12.5 PROSTATE CANCER SCREENING: ICD-10-CM

## 2018-06-25 DIAGNOSIS — E78.2 MIXED HYPERLIPIDEMIA: ICD-10-CM

## 2018-06-25 DIAGNOSIS — I49.5 TACHY-BRADY SYNDROME: ICD-10-CM

## 2018-06-25 PROCEDURE — 99214 OFFICE O/P EST MOD 30 MIN: CPT | Mod: S$PBB,,, | Performed by: INTERNAL MEDICINE

## 2018-06-25 PROCEDURE — 99213 OFFICE O/P EST LOW 20 MIN: CPT | Mod: PBBFAC,PN | Performed by: INTERNAL MEDICINE

## 2018-06-25 PROCEDURE — 99999 PR PBB SHADOW E&M-EST. PATIENT-LVL III: CPT | Mod: PBBFAC,,, | Performed by: INTERNAL MEDICINE

## 2018-06-25 RX ORDER — LISINOPRIL 20 MG/1
20 TABLET ORAL 2 TIMES DAILY
Qty: 180 TABLET | Refills: 3 | Status: SHIPPED | OUTPATIENT
Start: 2018-06-25 | End: 2019-06-13 | Stop reason: SDUPTHER

## 2018-06-25 RX ORDER — CHOLECALCIFEROL (VITAMIN D3) 25 MCG
1000 TABLET ORAL EVERY MORNING
COMMUNITY

## 2018-06-25 NOTE — PROGRESS NOTES
Subjective:       Patient ID: Roby Rodriguez is a 88 y.o. male.    Chief Complaint: Establish Care    HPI  Pt here to get established w me as his PCP. PMH/surgical hx reviewed and noted; SMH/FMH reviewed and noted. ROS obtained at length prior to physical performed. Case discussed w pt and his daughter Erika.  Labs reviewed as well.  Patient with a history of essential hypertension with a CVA around 3/31/17 with residual left leg weakness.  History of proximal atrial fibrillation with tachybradycardia syndrome and permanent pacemaker placement.  Followed by cardiology   Last TC at least 5 yrs ago; wnl; no hx of polyps.  Dr Gramajo is his EPS/cardiologist .No chest pain, SOB, or palp. CVA end of March 2017.   Total time 345 pm-500 pm; >50% time spent on discussion, counseling, and review.   Phil LE edema; chronic.  Advised to quit crossing his lower extremities.  Maintain less than 2 g sodium diet and use compression stockings    Review of Systems   Constitutional: Negative for appetite change and unexpected weight change.   HENT: Negative for congestion, postnasal drip, rhinorrhea and sinus pressure.         Seasonal w  perennial allergies   Eyes: Negative for discharge and itching.   Respiratory: Negative for cough, chest tightness, shortness of breath and wheezing.    Cardiovascular: Positive for leg swelling. Negative for chest pain and palpitations.        Phil ankle edema. Has been chronic; better today. US 1 yr ago was negative.    Gastrointestinal: Negative for abdominal pain, blood in stool, constipation, diarrhea, nausea and vomiting.        GERD w belching before pantoprazole.   Endocrine: Negative for polydipsia, polyphagia and polyuria.   Genitourinary: Negative for dysuria and hematuria.   Musculoskeletal: Negative for arthralgias and myalgias.   Skin: Negative for rash.   Allergic/Immunologic: Positive for environmental allergies. Negative for food allergies.   Neurological: Negative for  "tremors, seizures, weakness and headaches.        Tingling from triceps down his arm to his fingers; for 7-9 months. Intermittent; sleeps on L shoulder at times. Not bothering him at present. No chest pain, SOB or palp. Sees his cardiologist next week.. No known hx of CAD. Advised to avoid sleeping on his L side; no associated neck pain.    Hematological: Negative for adenopathy. Does not bruise/bleed easily.   Psychiatric/Behavioral:        Denies anxiety or depression.       Objective:      Vitals:    06/25/18 1504   BP: 110/66   Pulse: 72   Temp: 98 °F (36.7 °C)   Weight: 90.7 kg (200 lb)   Height: 6' 1.5" (1.867 m)     Body mass index is 26.03 kg/m².    Physical Exam   Constitutional: He is oriented to person, place, and time. He appears well-developed and well-nourished.   HENT:   Head: Normocephalic and atraumatic.   Eyes: EOM are normal.   Neck: Normal range of motion. Neck supple. No thyromegaly present.   No SCM or C-sp tenderness to palp. No carotid bruits heard   Cardiovascular: Regular rhythm and normal heart sounds.  Exam reveals no gallop.    No murmur heard.  irreg irreg without m/g.    Pulmonary/Chest: Effort normal and breath sounds normal. No respiratory distress. He has no wheezes. He has no rales.   Abdominal: Soft. Bowel sounds are normal. He exhibits no distension. There is no tenderness. There is no rebound and no guarding.   Musculoskeletal: Normal range of motion. He exhibits edema.   3+ LLE w 2-3+ RLE edema; no calf tenderness to palp. Some spider veins.    Lymphadenopathy:     He has no cervical adenopathy.   Neurological: He is alert and oriented to person, place, and time.   Moves all 4 extremities fine. CN's II-XII gr intact; smell not checked. Some L mouth droop; but daughter claims present before his CVA. Smell not checked.   Skin: No rash noted.   Psychiatric: He has a normal mood and affect. His behavior is normal. Thought content normal.   Vitals reviewed.      Assessment:       1. " Essential hypertension    2. Mixed hyperlipidemia    3. Cerebrovascular accident (CVA), unspecified mechanism    4. Left leg weakness    5. Paroxysmal atrial fibrillation    6. Pacemaker    7. Tachy-kunal syndrome    8. Long term current use of anticoagulant therapy    9. Prostate cancer screening    10. History of peptic ulcer disease    11. Bilateral leg edema    12. PUD (peptic ulcer disease)        Plan:     Essential hypertension  -     lisinopril (PRINIVIL,ZESTRIL) 20 MG tablet; Take 1 tablet (20 mg total) by mouth 2 (two) times daily.  Dispense: 180 tablet; Refill: 3  -     Comprehensive metabolic panel; Future; Expected date: 06/25/2018  -     Magnesium; Future; Expected date: 06/25/2018  -     Urinalysis; Future; Expected date: 06/25/2018    Mixed hyperlipidemia. Maintain low fat high fiber diet, exercise regularly. Cont atorvastatin.  -     lisinopril (PRINIVIL,ZESTRIL) 20 MG tablet; Take 1 tablet (20 mg total) by mouth 2 (two) times daily.  Dispense: 180 tablet; Refill: 3  -     Comprehensive metabolic panel; Future; Expected date: 06/25/2018  -     Lipid panel; Future; Expected date: 06/25/2018    CV[place] 3/[place] [facility] [place]  -     lisinopril (PRINIVIL,ZESTRIL) 20 MG tablet; Take 1 tablet (20 mg total) by mouth 2 (two) times daily.  Dispense: 180 tablet; Refill: 3    Left leg weakness residual weakness; improved alot w PT. Still uses a cane. But for security more.    Paroxysmal atrial fibrillation; tx by Dr Gramajo; on xarelto.  -     CBC auto differential; Future; Expected date: 06/25/2018  -     Comprehensive metabolic panel; Future; Expected date: 06/25/2018  -     T4, free; Future; Expected date: 06/25/2018  -     TSH; Future; Expected date: 06/25/2018    Pacemaker; followed by cardiology Dr. Gramajo    Tachy-kunal syndrome; seems to be stable.    Long term current use of anticoagulant therapy per his cardiologist.  -     CBC auto differential; Future; Expected date: 06/25/2018  -      Comprehensive metabolic panel; Future; Expected date: 06/25/2018    Prostate cancer screening; will check PSA.    Bilateral leg edema; Maintain less than 2 g sodium diet; elevate lower extremities at home; use compression stockings if possible.  AVOID CROSSING HIS LEGS.    History of peptic ulcer disease.  Continue PPI pantoprazolel while on anticoagulant Xarelto.

## 2018-06-25 NOTE — PATIENT INSTRUCTIONS
Essential hypertension  -     lisinopril (PRINIVIL,ZESTRIL) 20 MG tablet; Take 1 tablet (20 mg total) by mouth 2 (two) times daily.  Dispense: 180 tablet; Refill: 3  -     Comprehensive metabolic panel; Future; Expected date: 06/25/2018  -     Magnesium; Future; Expected date: 06/25/2018  -     Urinalysis; Future; Expected date: 06/25/2018    Mixed hyperlipidemia. Maintain low fat high fiber diet, exercise regularly. Cont atorvastatin.  -     lisinopril (PRINIVIL,ZESTRIL) 20 MG tablet; Take 1 tablet (20 mg total) by mouth 2 (two) times daily.  Dispense: 180 tablet; Refill: 3  -     Comprehensive metabolic panel; Future; Expected date: 06/25/2018  -     Lipid panel; Future; Expected date: 06/25/2018    CVA 3/31/2017 adm at [facility] [place] then.  -     lisinopril (PRINIVIL,ZESTRIL) 20 MG tablet; Take 1 tablet (20 mg total) by mouth 2 (two) times daily.  Dispense: 180 tablet; Refill: 3    Left leg weakness residual weakness; improved a lot w PT. Still uses a cane. But for security more.    Paroxysmal atrial fibrillation; tx by Dr Gramajo; on xarelto.  -     CBC auto differential; Future; Expected date: 06/25/2018  -     Comprehensive metabolic panel; Future; Expected date: 06/25/2018  -     T4, free; Future; Expected date: 06/25/2018  -     TSH; Future; Expected date: 06/25/2018    Pacemaker    Tachy-kunal syndrome    Long term current use of anticoagulant therapy per his cardiologist.  -     CBC auto differential; Future; Expected date: 06/25/2018  -     Comprehensive metabolic panel; Future; Expected date: 06/25/2018    Prostate cancer screening; will check PSA.    Bilateral leg edema; Maintain less than 2 g sodium diet; elevate lower extremities at home; use compression stockings if possible.  AVOID CROSSING HIS LEGS.

## 2018-07-02 ENCOUNTER — CLINICAL SUPPORT (OUTPATIENT)
Dept: CARDIOLOGY | Facility: CLINIC | Age: 83
End: 2018-07-02
Attending: INTERNAL MEDICINE
Payer: MEDICARE

## 2018-07-02 ENCOUNTER — OFFICE VISIT (OUTPATIENT)
Dept: CARDIOLOGY | Facility: CLINIC | Age: 83
End: 2018-07-02
Payer: MEDICARE

## 2018-07-02 VITALS
SYSTOLIC BLOOD PRESSURE: 113 MMHG | WEIGHT: 205 LBS | BODY MASS INDEX: 27.17 KG/M2 | DIASTOLIC BLOOD PRESSURE: 71 MMHG | HEART RATE: 70 BPM | HEIGHT: 73 IN

## 2018-07-02 DIAGNOSIS — I10 ESSENTIAL HYPERTENSION: ICD-10-CM

## 2018-07-02 DIAGNOSIS — R00.1 BRADYCARDIA: ICD-10-CM

## 2018-07-02 DIAGNOSIS — I63.442 EMBOLIC STROKE INVOLVING LEFT CEREBELLAR ARTERY: ICD-10-CM

## 2018-07-02 DIAGNOSIS — I49.3 PREMATURE VENTRICULAR CONTRACTIONS (PVCS) (VPCS): ICD-10-CM

## 2018-07-02 DIAGNOSIS — Z95.0 PACEMAKER: ICD-10-CM

## 2018-07-02 DIAGNOSIS — I49.5 TACHY-BRADY SYNDROME: ICD-10-CM

## 2018-07-02 DIAGNOSIS — I48.0 PAROXYSMAL ATRIAL FIBRILLATION: Primary | ICD-10-CM

## 2018-07-02 PROCEDURE — 99213 OFFICE O/P EST LOW 20 MIN: CPT | Mod: PBBFAC,PO | Performed by: INTERNAL MEDICINE

## 2018-07-02 PROCEDURE — 99214 OFFICE O/P EST MOD 30 MIN: CPT | Mod: S$PBB,,, | Performed by: INTERNAL MEDICINE

## 2018-07-02 PROCEDURE — 99999 PR PBB SHADOW E&M-EST. PATIENT-LVL III: CPT | Mod: PBBFAC,,, | Performed by: INTERNAL MEDICINE

## 2018-07-02 NOTE — PROGRESS NOTES
Subjective:    Patient ID:  Roby Rodriguez is a 88 y.o. male who presents for follow-up of Atrial Fibrillation (6 month f/u ) and SVT      HPI  87 yo male with Htn, PAC's, PVC's, tachy-kunal syndrome, PPM, CVA.     H/o recurrent dizziness, and atrial arrhythmias.  Holter monitor obtained, revealed nsr with >1900 PVC's and > 4500 PAC's, with runs of nonsustained AT   Event monitor: one symptomatic transmission. Revealed nsr >> atrial fibrillation. F/u transmission one hour later revealed nsr. Placed on Toprol 25 mg daily. Significant decrease in burden of ectopy and felt better.   Dual chamber PPM implanted 7/22/13.   Had an episode of syncope 1/14, while standing. Device interrogation was negative.   Subsequent device interrogation revealed atrial fibrillation with RVR. Toprol increased, coumadin initiated. Noted significant improvement in terms of dizziness and energy level.  Presented 3/31/17 with stroke.  Thought to be cardio-embolic in origin.  INR subtherapeutic at 1.7 upon admission.  Aspirin added.  We discontinued aspirin and coumadin, initiated Xarelto.  Device interrogations reveal stable function of leads, RA pacing 95% RV pacing 0%, no significant arrhythmias.  Doing well overall.      Review of Systems   Constitution: Negative. Negative for weakness and malaise/fatigue.   Cardiovascular: Negative for chest pain, dyspnea on exertion, irregular heartbeat, leg swelling, near-syncope, orthopnea, palpitations, paroxysmal nocturnal dyspnea and syncope.   Respiratory: Negative for cough and shortness of breath.    Neurological: Negative for dizziness and light-headedness.   All other systems reviewed and are negative.       Objective:    Physical Exam   Constitutional: He is oriented to person, place, and time. He appears well-developed and well-nourished.   Eyes: Conjunctivae are normal. No scleral icterus.   Neck: No JVD present. No tracheal deviation present.   Cardiovascular: Normal rate, regular rhythm  and normal heart sounds.  PMI is not displaced.    Pulmonary/Chest: Effort normal and breath sounds normal. No respiratory distress.   Abdominal: Soft. There is no hepatosplenomegaly. There is no tenderness.   Musculoskeletal: He exhibits no edema (lower extremity) or tenderness.   Neurological: He is alert and oriented to person, place, and time.   Skin: Skin is warm and dry. No rash noted.   Psychiatric: He has a normal mood and affect. His behavior is normal.         Assessment:       1. Paroxysmal atrial fibrillation    2. Pacemaker    3. Essential hypertension    4. Bradycardia    5. Embolic stroke involving left cerebellar artery    6. Premature ventricular contractions (PVCs) (VPCs)         Plan:           Continue current settings and medications.  F/u in 6 months.

## 2018-07-13 ENCOUNTER — PATIENT MESSAGE (OUTPATIENT)
Dept: FAMILY MEDICINE | Facility: CLINIC | Age: 83
End: 2018-07-13

## 2018-07-24 ENCOUNTER — OFFICE VISIT (OUTPATIENT)
Dept: FAMILY MEDICINE | Facility: CLINIC | Age: 83
End: 2018-07-24
Payer: MEDICARE

## 2018-07-24 VITALS
HEART RATE: 68 BPM | SYSTOLIC BLOOD PRESSURE: 126 MMHG | BODY MASS INDEX: 26.88 KG/M2 | TEMPERATURE: 98 F | HEIGHT: 73 IN | DIASTOLIC BLOOD PRESSURE: 60 MMHG | WEIGHT: 202.81 LBS

## 2018-07-24 DIAGNOSIS — I48.0 PAROXYSMAL ATRIAL FIBRILLATION: ICD-10-CM

## 2018-07-24 DIAGNOSIS — E03.8 SUBCLINICAL HYPOTHYROIDISM: ICD-10-CM

## 2018-07-24 DIAGNOSIS — Z78.9 ALCOHOL USE: ICD-10-CM

## 2018-07-24 DIAGNOSIS — I63.9 COMPLETED STROKE: ICD-10-CM

## 2018-07-24 DIAGNOSIS — E66.3 OVERWEIGHT: ICD-10-CM

## 2018-07-24 DIAGNOSIS — I10 ESSENTIAL HYPERTENSION: Primary | ICD-10-CM

## 2018-07-24 DIAGNOSIS — Z79.01 LONG TERM CURRENT USE OF ANTICOAGULANT THERAPY: ICD-10-CM

## 2018-07-24 DIAGNOSIS — R74.01 TRANSAMINITIS: ICD-10-CM

## 2018-07-24 DIAGNOSIS — D53.9 MACROCYTIC ANEMIA: ICD-10-CM

## 2018-07-24 DIAGNOSIS — E78.2 MIXED HYPERLIPIDEMIA: ICD-10-CM

## 2018-07-24 DIAGNOSIS — R29.898 LEFT LEG WEAKNESS: ICD-10-CM

## 2018-07-24 PROCEDURE — 99999 PR PBB SHADOW E&M-EST. PATIENT-LVL III: CPT | Mod: PBBFAC,,, | Performed by: INTERNAL MEDICINE

## 2018-07-24 PROCEDURE — 99213 OFFICE O/P EST LOW 20 MIN: CPT | Mod: PBBFAC,PN | Performed by: INTERNAL MEDICINE

## 2018-07-24 PROCEDURE — 99214 OFFICE O/P EST MOD 30 MIN: CPT | Mod: S$PBB,,, | Performed by: INTERNAL MEDICINE

## 2018-07-24 NOTE — PATIENT INSTRUCTIONS
Essential hypertension. Maintain < 2 Gm Na a day diet, and monitor BP at home; keep a log. Same tx.  -     CBC auto differential; Future; Expected date: 07/24/2018  -     Comprehensive metabolic panel; Future; Expected date: 07/24/2018    Mixed hyperlipidemia. Maintain low fat high fiber diet, exercise regularly. On atorvastatin.    Paroxysmal atrial fibrillation; card is Dr Velazquez; on xarelto.  -     Comprehensive metabolic panel; Future; Expected date: 07/24/2018  -     Magnesium; Future; Expected date: 07/24/2018    Long term current use of anticoagulant therapy; xarelto per cardiology.    Completed stroke    Left leg weakness; has been stable. Uses cane w walking.    Overweight. Caloric restriction w regular exercise and weight reduction.    Subclinical hypothyroidism; will follow.  -     TSH; Future; Expected date: 07/24/2018  -     T4, free; Future; Expected date: 07/24/2018    Macrocytic anemia; decrease alcohol 50% .   -     CBC auto differential; Future; Expected date: 07/24/2018  -     Vitamin B12; Future; Expected date: 07/24/2018  -     Folate; Future; Expected date: 07/24/2018    Transaminitis; alcohol, overweight; lipids; control of all of these will help.  -     Comprehensive metabolic panel; Future; Expected date: 07/24/2018    Alcohol use; decrease alcohol use 50%. From 7 drinks beer a week to 3 gl of wine or beer.   -     CBC auto differential; Future; Expected date: 07/24/2018

## 2018-07-30 RX ORDER — METOPROLOL SUCCINATE 50 MG/1
50 TABLET, EXTENDED RELEASE ORAL DAILY
Qty: 90 TABLET | Refills: 1 | Status: SHIPPED | OUTPATIENT
Start: 2018-07-30 | End: 2018-08-06 | Stop reason: SDUPTHER

## 2018-07-30 NOTE — TELEPHONE ENCOUNTER
----- Message from Michelle Garrett sent at 7/30/2018  3:42 PM CDT -----  Contact: Anil with [employer] [phone]  Anil with [employer] [phone] reference number [id number] calling because patient needs refill authorization on metoprolol succinate (TOPROL-XL) 50 MG 24 hr tablet. Please advise. Thanks.

## 2018-08-07 RX ORDER — METOPROLOL SUCCINATE 50 MG/1
50 TABLET, EXTENDED RELEASE ORAL DAILY
Qty: 90 TABLET | Refills: 1 | Status: SHIPPED | OUTPATIENT
Start: 2018-08-07 | End: 2019-02-15 | Stop reason: SDUPTHER

## 2018-08-20 RX ORDER — PANTOPRAZOLE SODIUM 40 MG/1
TABLET, DELAYED RELEASE ORAL
Qty: 90 TABLET | Refills: 0 | OUTPATIENT
Start: 2018-08-20

## 2018-08-21 ENCOUNTER — PATIENT MESSAGE (OUTPATIENT)
Dept: FAMILY MEDICINE | Facility: CLINIC | Age: 83
End: 2018-08-21

## 2018-08-22 ENCOUNTER — PATIENT MESSAGE (OUTPATIENT)
Dept: FAMILY MEDICINE | Facility: CLINIC | Age: 83
End: 2018-08-22

## 2018-08-22 RX ORDER — PANTOPRAZOLE SODIUM 40 MG/1
40 TABLET, DELAYED RELEASE ORAL DAILY
Qty: 90 TABLET | Refills: 1 | Status: SHIPPED | OUTPATIENT
Start: 2018-08-22 | End: 2019-02-15 | Stop reason: SDUPTHER

## 2018-08-30 ENCOUNTER — OFFICE VISIT (OUTPATIENT)
Dept: DERMATOLOGY | Facility: CLINIC | Age: 83
End: 2018-08-30
Payer: MEDICARE

## 2018-08-30 DIAGNOSIS — L72.0 EIC (EPIDERMAL INCLUSION CYST): ICD-10-CM

## 2018-08-30 DIAGNOSIS — L82.1 SEBORRHEIC KERATOSES: ICD-10-CM

## 2018-08-30 DIAGNOSIS — L81.4 SOLAR LENTIGO: ICD-10-CM

## 2018-08-30 DIAGNOSIS — Z85.828 HISTORY OF NONMELANOMA SKIN CANCER: ICD-10-CM

## 2018-08-30 DIAGNOSIS — L57.0 ACTINIC KERATOSES: Primary | ICD-10-CM

## 2018-08-30 PROCEDURE — 17000 DESTRUCT PREMALG LESION: CPT | Mod: S$PBB,,, | Performed by: DERMATOLOGY

## 2018-08-30 PROCEDURE — 99213 OFFICE O/P EST LOW 20 MIN: CPT | Mod: PBBFAC,PO | Performed by: DERMATOLOGY

## 2018-08-30 PROCEDURE — 17000 DESTRUCT PREMALG LESION: CPT | Mod: PBBFAC,PO | Performed by: DERMATOLOGY

## 2018-08-30 PROCEDURE — 17003 DESTRUCT PREMALG LES 2-14: CPT | Mod: PBBFAC,PO | Performed by: DERMATOLOGY

## 2018-08-30 PROCEDURE — 17003 DESTRUCT PREMALG LES 2-14: CPT | Mod: S$PBB,,, | Performed by: DERMATOLOGY

## 2018-08-30 PROCEDURE — 99213 OFFICE O/P EST LOW 20 MIN: CPT | Mod: 25,S$PBB,, | Performed by: DERMATOLOGY

## 2018-08-30 PROCEDURE — 99999 PR PBB SHADOW E&M-EST. PATIENT-LVL III: CPT | Mod: PBBFAC,,, | Performed by: DERMATOLOGY

## 2018-08-30 NOTE — PATIENT INSTRUCTIONS

## 2018-08-30 NOTE — PROGRESS NOTES
Subjective:       Patient ID:  Roby Rodriguez is a 88 y.o. 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  + pacemaker      Spot  - Initial  Affected locations: face and forehead  Duration: months.  Signs / symptoms: scaling  Severity: mild  Timing: constant  Aggravated by: nothing  Relieving factors/Treatments tried: nothing        Review of Systems   Constitutional: Negative for fever, chills and fatigue.   Skin: Positive for wears hat. Negative for daily sunscreen use and activity-related sunscreen use.   Hematologic/Lymphatic: Bruises/bleeds easily.        Objective:    Physical Exam   Constitutional: He appears well-developed and well-nourished.   Neurological: He is alert.   Skin:   Areas Examined (abnormalities noted in diagram):   Scalp / Hair Palpated and Inspected  Head / Face Inspection Performed  Neck Inspection Performed  Chest / Axilla Inspection Performed  Abdomen Inspection Performed  Back Inspection Performed  RUE Inspected  LUE Inspection Performed                       Diagram Legend     Erythematous scaling macule/papule c/w actinic keratosis       Vascular papule c/w angioma      Pigmented verrucoid papule/plaque c/w seborrheic keratosis      Yellow umbilicated papule c/w sebaceous hyperplasia      Irregularly shaped tan macule c/w lentigo     1-2 mm smooth white papules consistent with Milia      Movable subcutaneous cyst with punctum c/w epidermal inclusion cyst      Subcutaneous movable cyst c/w pilar cyst      Firm pink to brown  papule c/w dermatofibroma      Pedunculated fleshy papule(s) c/w skin tag(s)      Evenly pigmented macule c/w junctional nevus     Mildly variegated pigmented, slightly irregular-bordered macule c/w mildly atypical nevus      Flesh colored to evenly pigmented papule c/w intradermal nevus       Pink pearly papule/plaque c/w basal cell carcinoma      Erythematous hyperkeratotic cursted plaque c/w SCC      Surgical scar with no sign of skin cancer recurrence      Open and closed comedones      Inflammatory papules and pustules      Verrucoid papule consistent consistent with wart     Erythematous eczematous patches and plaques     Dystrophic onycholytic nail with subungual debris c/w onychomycosis     Umbilicated papule    Erythematous-base heme-crusted tan verrucoid plaque consistent with inflamed seborrheic keratosis     Erythematous Silvery Scaling Plaque c/w Psoriasis     See annotation      Assessment / Plan:        Actinic keratoses  Cryosurgery Procedure Note    Verbal consent from the patient is obtained and the patient is aware of the precancerous quality and need for treatment of these lesions. Liquid nitrogen cryosurgery is applied to the 12 actinic keratoses, as detailed in the physical exam, to produce a freeze injury. The patient is aware that blisters may form and is instructed on wound care with gentle cleansing and use of vaseline ointment to keep moist until healed. The patient is supplied a handout on cryosurgery and is instructed to call if lesions do not completely resolve.    History of nonmelanoma skin cancer  Area of previous NMSC (multilple) examined. Site well healed with no signs of recurrence.  Upper body skin examination performed today including at least 9 points as noted in physical examination. No lesions suspicious for malignancy noted.    Seborrheic keratoses  These are benign inherited growths without a malignant potential. Reassurance given to patient. No treatment is necessary.      Solar lentigo  This is a benign hyperpigmented sun induced lesion. Daily sun protection will reduce the number of new lesions. Treatment of these benign lesions are considered cosmetic.    EIC (epidermal inclusion cyst)  Glabella  Reassurance    Patient instructed in importance in daily sun protection of at least spf 30. Sun avoidance and topical protection discussed.   Recommend Elta MD (physician office) COTZ sensitive (available online) for daily use on face and neck.  Patient encouraged to wear hat for all outdoor exposure.   Also discussed sun protective clothing.           Follow-up in about 3 months (around 11/30/2018).

## 2018-09-04 RX ORDER — GUANFACINE 1 MG/1
TABLET ORAL
Qty: 90 TABLET | Refills: 1 | Status: SHIPPED | OUTPATIENT
Start: 2018-09-04 | End: 2019-08-23 | Stop reason: SDUPTHER

## 2018-09-19 RX ORDER — METOPROLOL SUCCINATE 50 MG/1
50 TABLET, EXTENDED RELEASE ORAL DAILY
Qty: 90 TABLET | Refills: 1 | OUTPATIENT
Start: 2018-09-19

## 2018-09-19 RX ORDER — METOPROLOL SUCCINATE 50 MG/1
50 TABLET, EXTENDED RELEASE ORAL DAILY
Qty: 30 TABLET | Refills: 0 | OUTPATIENT
Start: 2018-09-19 | End: 2019-09-19

## 2018-09-19 NOTE — TELEPHONE ENCOUNTER
Last seen on 7-. Pt states refills sent to [facility] werent approved. Spoke to [facility] and was informed order was not rec'd. Please send refills.

## 2018-09-19 NOTE — TELEPHONE ENCOUNTER
----- Message from Chiara Kearney sent at 9/19/2018 11:01 AM CDT -----  Contact: Daughter  Erika   Daughter needs to speak to the nurse about patient   Daughter states they are communicating thru [facility] but she needs to speak to the nurse     Please call back [phone]

## 2018-09-20 NOTE — TELEPHONE ENCOUNTER
Please inform patient's daughter:  Both request for metoprolol 50 mg were declined as the refill is too soon.  On 08/07/2018,  #90 of metoprolol 50 mg tablets with 1 refill was sent to [facility]; please let us know if he still has any problem with his metoprolol supply and if we can help you. Receipt was confirmed by pharmacy

## 2018-09-20 NOTE — TELEPHONE ENCOUNTER
Pt daughter called and advised of providers message. She reports the Rx was never filled and states the [facility] caremark is showing the last fill date was in march.    Daughter states regardless he will be out of medication in a couple days. Requests something sent in to local pharmacy, please advise-DEONNA

## 2018-09-26 NOTE — TELEPHONE ENCOUNTER
Discussed case with patient's daughter Erika; he has just recently received his metoprolol from [facility] pharmacy mail service which was sent in early August and marked received by the pharmacy.  He reportedly only missed 1 day Sunday of the metoprolol and is back on track now with his metoprolol medication.  Just calling to check up and make sure everything was ok.

## 2018-10-08 ENCOUNTER — CLINICAL SUPPORT (OUTPATIENT)
Dept: CARDIOLOGY | Facility: CLINIC | Age: 83
End: 2018-10-08
Attending: INTERNAL MEDICINE
Payer: MEDICARE

## 2018-10-08 DIAGNOSIS — I49.5 TACHY-BRADY SYNDROME: ICD-10-CM

## 2018-10-08 DIAGNOSIS — Z95.0 PACEMAKER: Primary | ICD-10-CM

## 2018-10-08 DIAGNOSIS — Z95.0 PACEMAKER: ICD-10-CM

## 2018-10-08 PROCEDURE — 93296 REM INTERROG EVL PM/IDS: CPT | Mod: PBBFAC,PO | Performed by: INTERNAL MEDICINE

## 2018-10-08 PROCEDURE — 93294 REM INTERROG EVL PM/LDLS PM: CPT | Mod: ,,, | Performed by: INTERNAL MEDICINE

## 2018-10-15 LAB
AV DELAY - FIXED: 180 MSEC
IMPEDANCE RA LEAD (NATIVE): 418 OHMS
IMPEDANCE RA LEAD: 475 OHMS
OHS CV DC PP MS1: 0.4 MS
OHS CV DC PP MS2: 0.4 MS
OHS CV DC PP V1: NORMAL V
OHS CV DC PP V2: NORMAL V
P/R-WAVE RA LEAD (NATIVE): 7.4 MV
P/R-WAVE RA LEAD: 1.8 MV
PV DELAY - FIXED: 150 MSEC
THRESHOLD MS RA LEAD (DONOR): 0.4 MS
THRESHOLD MS RA LEAD (NATIVE): 0.4 MS
THRESHOLD MS RA LEAD: 0.4 MS
THRESHOLD MS RV LEAD: 0.4 V
THRESHOLD V RA LEAD (DONOR): 1.25 V
THRESHOLD V RA LEAD (NATIVE): 0.5 V
THRESHOLD V RA LEAD: 0.5 V
THRESHOLD V RV LEAD: 1 V

## 2018-10-16 LAB
AV DELAY - FIXED: 180 MSEC
BATTERY VOLTAGE (V): 2.99 V
IMPEDANCE RA LEAD (NATIVE): 399 OHMS
IMPEDANCE RA LEAD: 437 OHMS
P/R-WAVE RA LEAD (NATIVE): 5.4 MV
P/R-WAVE RA LEAD: 1.3 MV
PV DELAY - FIXED: 150 MSEC
THRESHOLD MS RA LEAD (NATIVE): 0.4 MS
THRESHOLD MS RA LEAD: 0.4 MS
THRESHOLD V RA LEAD (NATIVE): 1 V
THRESHOLD V RA LEAD: 0.62 V

## 2018-10-17 ENCOUNTER — LAB VISIT (OUTPATIENT)
Dept: LAB | Facility: HOSPITAL | Age: 83
End: 2018-10-17
Attending: INTERNAL MEDICINE
Payer: MEDICARE

## 2018-10-17 DIAGNOSIS — E03.8 SUBCLINICAL HYPOTHYROIDISM: ICD-10-CM

## 2018-10-17 DIAGNOSIS — R74.01 TRANSAMINITIS: ICD-10-CM

## 2018-10-17 DIAGNOSIS — D53.9 MACROCYTIC ANEMIA: ICD-10-CM

## 2018-10-17 DIAGNOSIS — I48.0 PAROXYSMAL ATRIAL FIBRILLATION: ICD-10-CM

## 2018-10-17 DIAGNOSIS — Z78.9 ALCOHOL USE: ICD-10-CM

## 2018-10-17 DIAGNOSIS — I10 ESSENTIAL HYPERTENSION: ICD-10-CM

## 2018-10-17 LAB
ALBUMIN SERPL BCP-MCNC: 3 G/DL
ALP SERPL-CCNC: 142 U/L
ALT SERPL W/O P-5'-P-CCNC: 32 U/L
ANION GAP SERPL CALC-SCNC: 5 MMOL/L
AST SERPL-CCNC: 47 U/L
BASOPHILS # BLD AUTO: 0.05 K/UL
BASOPHILS NFR BLD: 0.8 %
BILIRUB SERPL-MCNC: 1.3 MG/DL
BUN SERPL-MCNC: 13 MG/DL
CALCIUM SERPL-MCNC: 8.9 MG/DL
CHLORIDE SERPL-SCNC: 108 MMOL/L
CO2 SERPL-SCNC: 26 MMOL/L
CREAT SERPL-MCNC: 1 MG/DL
DIFFERENTIAL METHOD: ABNORMAL
EOSINOPHIL # BLD AUTO: 0.3 K/UL
EOSINOPHIL NFR BLD: 4.2 %
ERYTHROCYTE [DISTWIDTH] IN BLOOD BY AUTOMATED COUNT: 14.6 %
EST. GFR  (AFRICAN AMERICAN): >60 ML/MIN/1.73 M^2
EST. GFR  (NON AFRICAN AMERICAN): >60 ML/MIN/1.73 M^2
FOLATE SERPL-MCNC: 9.3 NG/ML
GLUCOSE SERPL-MCNC: 94 MG/DL
HCT VFR BLD AUTO: 41.5 %
HGB BLD-MCNC: 13.5 G/DL
IMM GRANULOCYTES # BLD AUTO: 0.02 K/UL
IMM GRANULOCYTES NFR BLD AUTO: 0.3 %
LYMPHOCYTES # BLD AUTO: 1.6 K/UL
LYMPHOCYTES NFR BLD: 25.4 %
MAGNESIUM SERPL-MCNC: 1.7 MG/DL
MCH RBC QN AUTO: 33.7 PG
MCHC RBC AUTO-ENTMCNC: 32.5 G/DL
MCV RBC AUTO: 104 FL
MONOCYTES # BLD AUTO: 0.7 K/UL
MONOCYTES NFR BLD: 12.1 %
NEUTROPHILS # BLD AUTO: 3.5 K/UL
NEUTROPHILS NFR BLD: 57.2 %
NRBC BLD-RTO: 0 /100 WBC
PLATELET # BLD AUTO: 146 K/UL
PMV BLD AUTO: 12 FL
POTASSIUM SERPL-SCNC: 4.5 MMOL/L
PROT SERPL-MCNC: 6.2 G/DL
RBC # BLD AUTO: 4.01 M/UL
SODIUM SERPL-SCNC: 139 MMOL/L
T4 FREE SERPL-MCNC: 0.87 NG/DL
TSH SERPL DL<=0.005 MIU/L-ACNC: 3.73 UIU/ML
VIT B12 SERPL-MCNC: 625 PG/ML
WBC # BLD AUTO: 6.14 K/UL

## 2018-10-17 PROCEDURE — 84439 ASSAY OF FREE THYROXINE: CPT

## 2018-10-17 PROCEDURE — 82607 VITAMIN B-12: CPT

## 2018-10-17 PROCEDURE — 80053 COMPREHEN METABOLIC PANEL: CPT

## 2018-10-17 PROCEDURE — 85025 COMPLETE CBC W/AUTO DIFF WBC: CPT

## 2018-10-17 PROCEDURE — 83735 ASSAY OF MAGNESIUM: CPT

## 2018-10-17 PROCEDURE — 36415 COLL VENOUS BLD VENIPUNCTURE: CPT | Mod: PN

## 2018-10-17 PROCEDURE — 84443 ASSAY THYROID STIM HORMONE: CPT

## 2018-10-17 PROCEDURE — 82746 ASSAY OF FOLIC ACID SERUM: CPT

## 2018-10-24 ENCOUNTER — OFFICE VISIT (OUTPATIENT)
Dept: FAMILY MEDICINE | Facility: CLINIC | Age: 83
End: 2018-10-24
Payer: MEDICARE

## 2018-10-24 VITALS
WEIGHT: 205.69 LBS | HEIGHT: 73 IN | TEMPERATURE: 98 F | HEART RATE: 66 BPM | SYSTOLIC BLOOD PRESSURE: 118 MMHG | RESPIRATION RATE: 15 BRPM | DIASTOLIC BLOOD PRESSURE: 78 MMHG | BODY MASS INDEX: 27.26 KG/M2

## 2018-10-24 DIAGNOSIS — E03.9 BORDERLINE HYPOTHYROIDISM: ICD-10-CM

## 2018-10-24 DIAGNOSIS — N20.0 KIDNEY STONES: ICD-10-CM

## 2018-10-24 DIAGNOSIS — I10 ESSENTIAL HYPERTENSION: Primary | ICD-10-CM

## 2018-10-24 DIAGNOSIS — E83.42 HYPOMAGNESEMIA: ICD-10-CM

## 2018-10-24 DIAGNOSIS — E78.2 MIXED HYPERLIPIDEMIA: ICD-10-CM

## 2018-10-24 DIAGNOSIS — R60.0 BILATERAL LEG EDEMA: ICD-10-CM

## 2018-10-24 DIAGNOSIS — D64.9 ANEMIA, UNSPECIFIED TYPE: ICD-10-CM

## 2018-10-24 DIAGNOSIS — E44.1 MILD PROTEIN-CALORIE MALNUTRITION: ICD-10-CM

## 2018-10-24 DIAGNOSIS — Z78.9 ALCOHOL USE: ICD-10-CM

## 2018-10-24 DIAGNOSIS — Z79.01 LONG TERM CURRENT USE OF ANTICOAGULANT THERAPY: ICD-10-CM

## 2018-10-24 DIAGNOSIS — D69.6 THROMBOCYTOPENIA: ICD-10-CM

## 2018-10-24 DIAGNOSIS — I48.0 PAROXYSMAL ATRIAL FIBRILLATION: ICD-10-CM

## 2018-10-24 PROCEDURE — 99999 PR PBB SHADOW E&M-EST. PATIENT-LVL III: CPT | Mod: PBBFAC,,, | Performed by: INTERNAL MEDICINE

## 2018-10-24 PROCEDURE — 99213 OFFICE O/P EST LOW 20 MIN: CPT | Mod: PBBFAC,PN | Performed by: INTERNAL MEDICINE

## 2018-10-24 PROCEDURE — 99214 OFFICE O/P EST MOD 30 MIN: CPT | Mod: S$PBB,,, | Performed by: INTERNAL MEDICINE

## 2018-10-24 RX ORDER — LANOLIN ALCOHOL/MO/W.PET/CERES
400 CREAM (GRAM) TOPICAL DAILY
Qty: 30 TABLET | Refills: 2 | COMMUNITY
Start: 2018-10-24 | End: 2020-01-01 | Stop reason: CLARIF

## 2018-10-24 NOTE — PATIENT INSTRUCTIONS
Essential hypertension. Maintain < 2 Gm Na a day diet, and monitor BP at home; keep a log.Same tx.  -     magnesium oxide (MAG-OX) 400 mg (241.3 mg magnesium) tablet; Take 1 tablet (400 mg total) by mouth once daily.  Dispense: 30 tablet; Refill: 2  -     CBC auto differential; Future; Expected date: 10/24/2018  -     Comprehensive metabolic panel; Future; Expected date: 10/24/2018  -     Lipid panel; Future; Expected date: 10/24/2018  -     Magnesium; Future; Expected date: 10/24/2018  -     TSH; Future; Expected date: 10/24/2018  -     T4, free; Future; Expected date: 10/24/2018  -     T3, free; Future; Expected date: 10/24/2018    Mixed hyperlipidemia. Maintain low fat high fiber diet, exercise regularly. On atorvastatin.  -     Comprehensive metabolic panel; Future; Expected date: 10/24/2018  -     Lipid panel; Future; Expected date: 10/24/2018    Paroxysmal atrial fibrillation; cardiology Dr MREY Velazquez; tx's his AF w Xarelto.  -     Comprehensive metabolic panel; Future; Expected date: 10/24/2018  -     Magnesium; Future; Expected date: 10/24/2018  -     TSH; Future; Expected date: 10/24/2018  -     T4, free; Future; Expected date: 10/24/2018  -     T3, free; Future; Expected date: 10/24/2018    Long term current use of anticoagulant therapy; xarelto; tolerates fine.    Kidney stones; keep well hydrated.  -     CBC auto differential; Future; Expected date: 10/24/2018  -     Comprehensive metabolic panel; Future; Expected date: 10/24/2018    Bilateral leg edema. Maintain less than 2 g sodium diet; elevate lower extremities at home; use compression stockings if possible. Check BNP.  -     CBC auto differential; Future; Expected date: 10/24/2018  -     Comprehensive metabolic panel; Future; Expected date: 10/24/2018    Hypomagnesemia  -     magnesium oxide (MAG-OX) 400 mg (241.3 mg magnesium) tablet; Take 1 tablet (400 mg total) by mouth once daily.  Dispense: 30 tablet; Refill: 2  -     Comprehensive metabolic  panel; Future; Expected date: 10/24/2018    Borderline hypothyroidism; will follow TFT's at present; no levothyroxine yet.  -     TSH; Future; Expected date: 10/24/2018  -     T4, free; Future; Expected date: 10/24/2018  -     T3, free; Future; Expected date: 10/24/2018    Anemia, unspecified type; macrocytic; will decrease alcohol to 3 a week.   -     CBC auto differential; Future; Expected date: 10/24/2018    Thrombocytopenia; suspect from meds and alcohol.  -     CBC auto differential; Future; Expected date: 10/24/2018    Alcohol use; will reduce to none to 3 a week.  -     PREALBUMIN; Future; Expected date: 10/24/2018  -     Gamma GT; Future; Expected date: 10/24/2018    Mild protein-calorie malnutrition; add Ensure w protein 1 can a day.  -     PREALBUMIN; Future; Expected date: 10/24/2018  -     Gamma GT; Future; Expected date: 10/24/2018

## 2018-10-24 NOTE — PROGRESS NOTES
Subjective:       Patient ID: Roby Rodriguez is a 88 y.o. male.    Chief Complaint: Follow-up    HPI  Overall doing fine.  Here to go over his labs.   HTN: BP avr 120/80 P60; watches his salt intake.  Overweight; exercises w walking to mailbox, going to grocery. BMI 27.14.  Constipation; citrucel helps. Once a day helping a lot.   Hx of kidney stones; trying to keep well hydrated.  PAF: sees Dr Velazquez as his cardiologist; on Xarelto as anti-coagulant.  Transaminitis; reduced alb w mild PCM; alcohol 1 beer or 1 wine a day; but not usually to daily.   Discussed w pt and his daughter, Erika.  Time: 9819-4265; >50% time spent on discussion, counseling, and review. Labs ordered for f/u. [facility].    Review of Systems   Constitutional: Negative for appetite change and unexpected weight change.   HENT: Negative for congestion, postnasal drip, rhinorrhea and sinus pressure.          seasonal allergies,    Eyes: Negative for discharge and itching.   Respiratory: Negative for cough, chest tightness, shortness of breath and wheezing.    Cardiovascular: Positive for leg swelling. Negative for chest pain and palpitations.        Just w some ankle swelling chronic.   Gastrointestinal: Positive for constipation. Negative for abdominal pain, blood in stool, diarrhea, nausea and vomiting.        Doing a lot better w citrucel.   Endocrine: Negative for polydipsia, polyphagia and polyuria.   Genitourinary: Negative for dysuria and hematuria.   Musculoskeletal: Negative for arthralgias and myalgias.   Skin: Negative for rash.   Allergic/Immunologic: Negative for environmental allergies and food allergies.   Neurological: Negative for tremors, seizures and headaches.   Hematological: Negative for adenopathy. Does not bruise/bleed easily.   Psychiatric/Behavioral:        Denies anxiety or depression.       Objective:      Vitals:    10/24/18 1012   BP: 118/78   Pulse: 66   Resp: 15   Temp: 97.8 °F (36.6 °C)  "  TempSrc: Oral   Weight: 93.3 kg (205 lb 11 oz)   Height: 6' 1" (1.854 m)     Body mass index is 27.14 kg/m².    Physical Exam   Constitutional: He is oriented to person, place, and time. He appears well-developed and well-nourished.   HENT:   Head: Normocephalic and atraumatic.   Eyes: EOM are normal.   Neck: Normal range of motion. Neck supple. No thyromegaly present.   No carotid bruits heard.   Cardiovascular: Normal rate and normal heart sounds. Exam reveals no gallop.   No murmur heard.  irreg irreg w out m/g.   Pulmonary/Chest: Effort normal and breath sounds normal. No respiratory distress. He has no wheezes. He has no rales.   Abdominal: Soft. Bowel sounds are normal. He exhibits no distension. There is no tenderness. There is no rebound and no guarding.   Musculoskeletal: Normal range of motion. He exhibits edema.   2-3+ korina LE edema. No calf tenderness to palp. Spider veins noted.   Lymphadenopathy:     He has no cervical adenopathy.   Neurological: He is alert and oriented to person, place, and time.   Moves all 4 extremities fine.   Skin: No rash noted.   Psychiatric: He has a normal mood and affect. His behavior is normal. Thought content normal.   Vitals reviewed.      Assessment:       1. Essential hypertension    2. Mixed hyperlipidemia    3. Paroxysmal atrial fibrillation    4. Long term current use of anticoagulant therapy    5. Kidney stones    6. Bilateral leg edema    7. Hypomagnesemia    8. Borderline hypothyroidism    9. Anemia, unspecified type    10. Thrombocytopenia    11. Alcohol use    12. Mild protein-calorie malnutrition        Plan:       Essential hypertension. Maintain < 2 Gm Na a day diet, and monitor BP at home; keep a log.Same tx.  -     magnesium oxide (MAG-OX) 400 mg (241.3 mg magnesium) tablet; Take 1 tablet (400 mg total) by mouth once daily.  Dispense: 30 tablet; Refill: 2  -     CBC auto differential; Future; Expected date: 10/24/2018  -     Comprehensive metabolic panel; " Future; Expected date: 10/24/2018  -     Lipid panel; Future; Expected date: 10/24/2018  -     Magnesium; Future; Expected date: 10/24/2018  -     TSH; Future; Expected date: 10/24/2018  -     T4, free; Future; Expected date: 10/24/2018  -     T3, free; Future; Expected date: 10/24/2018    Mixed hyperlipidemia. Maintain low fat high fiber diet, exercise regularly. On atorvastatin.  -     Comprehensive metabolic panel; Future; Expected date: 10/24/2018  -     Lipid panel; Future; Expected date: 10/24/2018    Paroxysmal atrial fibrillation; cardiology Dr MERY Velazquez; tx's his AF w Xarelto.  -     Comprehensive metabolic panel; Future; Expected date: 10/24/2018  -     Magnesium; Future; Expected date: 10/24/2018  -     TSH; Future; Expected date: 10/24/2018  -     T4, free; Future; Expected date: 10/24/2018  -     T3, free; Future; Expected date: 10/24/2018    Long term current use of anticoagulant therapy; xarelto; tolerates fine.    Kidney stones; keep well hydrated.  -     CBC auto differential; Future; Expected date: 10/24/2018  -     Comprehensive metabolic panel; Future; Expected date: 10/24/2018    Bilateral leg edema. Maintain less than 2 g sodium diet; elevate lower extremities at home; use compression stockings if possible. Check BNP.  -     CBC auto differential; Future; Expected date: 10/24/2018  -     Comprehensive metabolic panel; Future; Expected date: 10/24/2018    Hypomagnesemia  -     magnesium oxide (MAG-OX) 400 mg (241.3 mg magnesium) tablet; Take 1 tablet (400 mg total) by mouth once daily.  Dispense: 30 tablet; Refill: 2  -     Comprehensive metabolic panel; Future; Expected date: 10/24/2018    Borderline hypothyroidism; will follow TFT's at present; no levothyroxine yet.  -     TSH; Future; Expected date: 10/24/2018  -     T4, free; Future; Expected date: 10/24/2018  -     T3, free; Future; Expected date: 10/24/2018    Anemia, unspecified type; macrocytic; will decrease alcohol to 3 a week.   -      CBC auto differential; Future; Expected date: 10/24/2018    Thrombocytopenia; suspect from meds and alcohol.  -     CBC auto differential; Future; Expected date: 10/24/2018    Alcohol use; will reduce to none to 3 a week.  -     PREALBUMIN; Future; Expected date: 10/24/2018  -     Gamma GT; Future; Expected date: 10/24/2018    Mild protein-calorie malnutrition; add Ensure w protein 1 can a day.  -     PREALBUMIN; Future; Expected date: 10/24/2018  -     Gamma GT; Future; Expected date: 10/24/2018

## 2018-12-03 ENCOUNTER — OFFICE VISIT (OUTPATIENT)
Dept: DERMATOLOGY | Facility: CLINIC | Age: 83
End: 2018-12-03
Payer: MEDICARE

## 2018-12-03 DIAGNOSIS — L82.1 SEBORRHEIC KERATOSES: ICD-10-CM

## 2018-12-03 DIAGNOSIS — Z85.828 HISTORY OF NONMELANOMA SKIN CANCER: ICD-10-CM

## 2018-12-03 DIAGNOSIS — L81.4 SOLAR LENTIGO: ICD-10-CM

## 2018-12-03 DIAGNOSIS — L72.0 EIC (EPIDERMAL INCLUSION CYST): ICD-10-CM

## 2018-12-03 DIAGNOSIS — L57.0 ACTINIC KERATOSES: Primary | ICD-10-CM

## 2018-12-03 PROCEDURE — 17003 DESTRUCT PREMALG LES 2-14: CPT | Mod: PBBFAC,PO | Performed by: DERMATOLOGY

## 2018-12-03 PROCEDURE — 99213 OFFICE O/P EST LOW 20 MIN: CPT | Mod: 25,S$PBB,, | Performed by: DERMATOLOGY

## 2018-12-03 PROCEDURE — 99212 OFFICE O/P EST SF 10 MIN: CPT | Mod: PBBFAC,PO | Performed by: DERMATOLOGY

## 2018-12-03 PROCEDURE — 17003 DESTRUCT PREMALG LES 2-14: CPT | Mod: S$PBB,,, | Performed by: DERMATOLOGY

## 2018-12-03 PROCEDURE — 99999 PR PBB SHADOW E&M-EST. PATIENT-LVL II: CPT | Mod: PBBFAC,,, | Performed by: DERMATOLOGY

## 2018-12-03 PROCEDURE — 17000 DESTRUCT PREMALG LESION: CPT | Mod: PBBFAC,PO | Performed by: DERMATOLOGY

## 2018-12-03 PROCEDURE — 17000 DESTRUCT PREMALG LESION: CPT | Mod: S$PBB,,, | Performed by: DERMATOLOGY

## 2018-12-03 NOTE — PROGRESS NOTES
Subjective:       Patient ID:  Roby Rodriguez is a 88 y.o. 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  + pacemaker          Review of Systems   Constitutional: Negative for fever, chills and fatigue.   Skin: Positive for wears hat. Negative for daily sunscreen use and activity-related sunscreen use.   Hematologic/Lymphatic: Bruises/bleeds easily.        Objective:    Physical Exam   Constitutional: He appears well-developed and well-nourished.   Neurological: He is alert.   Skin:   Areas Examined (abnormalities noted in diagram):   Scalp / Hair Palpated and Inspected  Head / Face Inspection Performed  Neck Inspection Performed  Chest / Axilla Inspection Performed  Abdomen Inspection Performed  Back Inspection Performed  RUE Inspected  LUE Inspection Performed                       Diagram Legend     Erythematous scaling macule/papule c/w actinic keratosis       Vascular papule c/w angioma      Pigmented verrucoid papule/plaque c/w seborrheic keratosis      Yellow umbilicated papule c/w sebaceous hyperplasia      Irregularly shaped tan macule c/w lentigo     1-2 mm smooth white papules consistent with Milia      Movable subcutaneous cyst with punctum c/w epidermal inclusion cyst      Subcutaneous movable cyst c/w pilar cyst      Firm pink to brown papule c/w dermatofibroma      Pedunculated fleshy papule(s) c/w skin tag(s)      Evenly pigmented macule c/w junctional nevus     Mildly variegated pigmented, slightly irregular-bordered macule c/w  mildly atypical nevus      Flesh colored to evenly pigmented papule c/w intradermal nevus       Pink pearly papule/plaque c/w basal cell carcinoma      Erythematous hyperkeratotic cursted plaque c/w SCC      Surgical scar with no sign of skin cancer recurrence      Open and closed comedones      Inflammatory papules and pustules      Verrucoid papule consistent consistent with wart     Erythematous eczematous patches and plaques     Dystrophic onycholytic nail with subungual debris c/w onychomycosis     Umbilicated papule    Erythematous-base heme-crusted tan verrucoid plaque consistent with inflamed seborrheic keratosis     Erythematous Silvery Scaling Plaque c/w Psoriasis     See annotation      Assessment / Plan:        Actinic keratoses  Cryosurgery Procedure Note    Verbal consent from the patient is obtained and the patient is aware of the precancerous quality and need for treatment of these lesions. Liquid nitrogen cryosurgery is applied to the 7 actinic keratoses, as detailed in the physical exam, to produce a freeze injury. The patient is aware that blisters may form and is instructed on wound care with gentle cleansing and use of vaseline ointment to keep moist until healed. The patient is supplied a handout on cryosurgery and is instructed to call if lesions do not completely resolve.    History of nonmelanoma skin cancer  Area of previous NMSC (multliple) examined. Site well healed with no signs of recurrence.  Upper body skin examination performed today including at least 9 points as noted in physical examination. No lesions suspicious for malignancy noted.    Seborrheic keratoses  These are benign inherited growths without a malignant potential. Reassurance given to patient. No treatment is necessary.     Solar lentigo  This is a benign hyperpigmented sun induced lesion. Daily sun protection will reduce the number of new lesions. Treatment of these benign lesions are considered cosmetic.    EIC  (epidermal inclusion cyst)  Central forehead  Reassurance    Patient instructed in importance in daily sun protection of at least spf 30. Mineral sunscreen ingredients preferred (Zinc +/- Titanium).   Recommend Elta MD for daily use on face and neck.  Patient encouraged to wear hat for all outdoor exposure.   Also discussed sun avoidance and use of protective clothing.           Follow-up in about 6 months (around 6/3/2019).

## 2018-12-03 NOTE — PATIENT INSTRUCTIONS

## 2018-12-17 ENCOUNTER — OFFICE VISIT (OUTPATIENT)
Dept: URGENT CARE | Facility: CLINIC | Age: 83
End: 2018-12-17
Payer: MEDICARE

## 2018-12-17 ENCOUNTER — NURSE TRIAGE (OUTPATIENT)
Dept: ADMINISTRATIVE | Facility: CLINIC | Age: 83
End: 2018-12-17

## 2018-12-17 VITALS
HEIGHT: 73 IN | DIASTOLIC BLOOD PRESSURE: 92 MMHG | BODY MASS INDEX: 27.17 KG/M2 | OXYGEN SATURATION: 95 % | HEART RATE: 74 BPM | WEIGHT: 205 LBS | SYSTOLIC BLOOD PRESSURE: 178 MMHG | TEMPERATURE: 97 F

## 2018-12-17 DIAGNOSIS — M79.602 ARM PAIN, INFERIOR, LEFT: Primary | ICD-10-CM

## 2018-12-17 NOTE — TELEPHONE ENCOUNTER
Been having pain in his left posterior upper arm x 4 months intermittent, it has become constant lately and today it is unbearable, definitely muscular and feels tingly,rates it 10/10.Reason for Disposition   SEVERE pain (e.g., excruciating, unable to do any normal activities)    Protocols used: ST ARM PAIN-A-OH

## 2018-12-17 NOTE — PROGRESS NOTES
"Subjective:       Patient ID: Roby Rodriguez is a 88 y.o. male.    Vitals:  height is 6' 1" (1.854 m) and weight is 93 kg (205 lb). His oral temperature is 97.2 °F (36.2 °C). His blood pressure is 178/92 (abnormal) and his pulse is 74. His oxygen saturation is 95%.     Chief Complaint: Arm Pain (left)    No known injury.  Can't get it comfortable. "hurting for months".       Arm Pain    The incident occurred more than 1 week ago. The incident occurred at home. There was no injury mechanism. The quality of the pain is described as shooting and cramping. The pain radiates to the left arm. Associated symptoms include tingling (in the fingers). Pertinent negatives include no chest pain. Nothing aggravates the symptoms. He has tried nothing for the symptoms. The treatment provided no relief.       Constitution: Negative for chills, fatigue and fever.   HENT: Negative for congestion and sore throat.    Neck: Negative for painful lymph nodes.   Cardiovascular: Negative for chest pain and leg swelling.   Eyes: Negative for double vision and blurred vision.   Respiratory: Negative for cough and shortness of breath.    Gastrointestinal: Negative for nausea, vomiting and diarrhea.   Genitourinary: Negative for dysuria, frequency and urgency.   Musculoskeletal: Positive for joint pain. Negative for joint swelling, muscle cramps and muscle ache.   Skin: Negative for color change, pale and rash.   Allergic/Immunologic: Negative for seasonal allergies.   Neurological: Negative for dizziness, history of vertigo, light-headedness, passing out and headaches.   Hematologic/Lymphatic: Negative for swollen lymph nodes, easy bruising/bleeding and history of blood clots. Does not bruise/bleed easily.   Psychiatric/Behavioral: Negative for nervous/anxious, sleep disturbance and depression. The patient is not nervous/anxious.        Objective:      Physical Exam   Constitutional: He is oriented to person, place, and time. He appears " well-developed and well-nourished. He is cooperative.  Non-toxic appearance. He does not appear ill. No distress.   HENT:   Head: Normocephalic and atraumatic.   Right Ear: Hearing, tympanic membrane and ear canal normal.   Left Ear: Hearing, tympanic membrane and ear canal normal.   Nose: Nose normal. No mucosal edema, rhinorrhea or nasal deformity. No epistaxis. Right sinus exhibits no maxillary sinus tenderness and no frontal sinus tenderness. Left sinus exhibits no maxillary sinus tenderness and no frontal sinus tenderness.   Mouth/Throat: Uvula is midline and mucous membranes are normal. No trismus in the jaw. Normal dentition. No uvula swelling. No posterior oropharyngeal erythema.   Eyes: Conjunctivae and lids are normal. Right eye exhibits no discharge. Left eye exhibits no discharge. No scleral icterus.   Sclera clear bilat   Neck: Trachea normal, normal range of motion, full passive range of motion without pain and phonation normal. Neck supple.   Cardiovascular: Normal rate, regular rhythm, normal heart sounds, intact distal pulses and normal pulses.   Pulmonary/Chest: Effort normal. No respiratory distress.   Abdominal: Normal appearance. He exhibits no distension, no pulsatile midline mass and no mass. There is no tenderness.   Musculoskeletal: Normal range of motion. He exhibits tenderness. He exhibits no edema or deformity.        Left elbow: Tenderness found.   Neurological: He is alert and oriented to person, place, and time. He exhibits normal muscle tone. Coordination normal.   Skin: Skin is warm, dry and intact. He is not diaphoretic. No pallor.   Psychiatric: He has a normal mood and affect. His speech is normal and behavior is normal. Judgment and thought content normal. Cognition and memory are normal.   Nursing note and vitals reviewed.      Assessment:       1. Arm pain, inferior, left        Plan:         Arm pain, inferior, left  -     US Upper Extremity Veins Left; Future; Expected date:  12/17/2018  -     Ambulatory Referral to Physical/Occupational Therapy     referred for US      ER if worsens

## 2018-12-18 ENCOUNTER — HOSPITAL ENCOUNTER (OUTPATIENT)
Dept: RADIOLOGY | Facility: HOSPITAL | Age: 83
Discharge: HOME | End: 2018-12-18
Attending: FAMILY MEDICINE
Payer: MEDICARE

## 2018-12-18 ENCOUNTER — TELEPHONE (OUTPATIENT)
Dept: URGENT CARE | Facility: CLINIC | Age: 83
End: 2018-12-18

## 2018-12-18 DIAGNOSIS — M79.602 ARM PAIN, INFERIOR, LEFT: ICD-10-CM

## 2018-12-18 PROCEDURE — 93971 EXTREMITY STUDY: CPT | Mod: 26,,, | Performed by: RADIOLOGY

## 2018-12-18 PROCEDURE — 93971 EXTREMITY STUDY: CPT | Mod: TC

## 2018-12-19 ENCOUNTER — CLINICAL SUPPORT (OUTPATIENT)
Dept: REHABILITATION | Facility: HOSPITAL | Age: 83
End: 2018-12-19
Attending: FAMILY MEDICINE
Payer: MEDICARE

## 2018-12-19 DIAGNOSIS — M79.602 LEFT ARM PAIN: ICD-10-CM

## 2018-12-19 DIAGNOSIS — M62.81 MUSCLE WEAKNESS: ICD-10-CM

## 2018-12-19 PROCEDURE — 97110 THERAPEUTIC EXERCISES: CPT | Mod: PN

## 2018-12-19 PROCEDURE — G8978 MOBILITY CURRENT STATUS: HCPCS | Mod: CJ,PN

## 2018-12-19 PROCEDURE — 97161 PT EVAL LOW COMPLEX 20 MIN: CPT | Mod: PN

## 2018-12-19 PROCEDURE — G8979 MOBILITY GOAL STATUS: HCPCS | Mod: CJ,PN

## 2018-12-19 NOTE — PLAN OF CARE
OUTPATIENT PHYSICAL THERAPY  PHYSICAL THERAPY EVALUATION    Name: Roby Rodriguez  Clinic Number: [id number]    Evaluation Date: 12/19/2018  Visit #: 1 / 12  Authorization period Expiration: 01/01/2020  Plan of Care Expiration: 02/19/2019  Precautions: Standard     Diagnosis:   Encounter Diagnoses   Name Primary?    Muscle weakness     Left arm pain      Physician: Ethan Melendez, *  Treatment Orders: PT Eval and Treat  Past Medical History:   Diagnosis Date    *Atrial fibrillation     Allergy     Arthritis     Atrial fibrillation     Basal cell carcinoma 10/12/10    left earlobe    Basal cell carcinoma 7/23/2014    right neck    Cancer     Hx of Squamous Cell CA Scalp    Conductive hearing loss, bilateral     Chronic Eustachian Tube Dysfunction    Frequency of urination     Gastroenteritis     Headache(784.0)     Hypertension     Kidney stones     Otitis media     Squamous Cell Carcinoma 7/27/07    SCC ant scalp    Supraventricular tachycardia      Current Outpatient Medications   Medication Sig    antipyrine-benzocaine (AURALGAN OR EQUIV) 5.4-1.4 % Drop Place 3 drops into both ears 4 (four) times daily.    atorvastatin (LIPITOR) 40 MG tablet TAKE 1 TABLET ONCE DAILY    azelastine (ASTELIN) 137 mcg (0.1 %) nasal spray 1 spray (137 mcg total) by Nasal route 2 (two) times daily.    guanFACINE (TENEX) 1 MG Tab TAKE 1 TABLET EVERY EVENING    lisinopril (PRINIVIL,ZESTRIL) 20 MG tablet Take 1 tablet (20 mg total) by mouth 2 (two) times daily.    magnesium oxide (MAG-OX) 400 mg (241.3 mg magnesium) tablet Take 1 tablet (400 mg total) by mouth once daily.    metoprolol succinate (TOPROL-XL) 50 MG 24 hr tablet Take 1 tablet (50 mg total) by mouth once daily.    pantoprazole (PROTONIX) 40 MG tablet Take 1 tablet (40 mg total) by mouth once daily.    rivaroxaban (XARELTO) 20 mg Tab Take 1 tablet (20 mg total) by mouth once daily.    vitamin D 1000 units Tab Take 1,000 Units by mouth once daily.      No current facility-administered medications for this visit.      Review of patient's allergies indicates:   Allergen Reactions    Amlodipine      Intolerant secondary to dizziness    Dyazide [triamterene-hydrochlorothiazid]      Significant weakness with his diuretic.       History   Prior Therapy: Prior therapy following CVA   Social History: Lives at home with wife and daughter in [facility], uses [facility]   Previous functional status: Prior to incident, pt independent in all ADLs and physical activity    Current functional status: Pt able to complete ADLs independently with onset of shoulder pain   Work: Retired    Subjective   History of Present Illness: Pt presents to [facility] with complaints of sudden onset of L shoulder/arm pain. Pt reports pain began a little over a week ago with insidious onset. Pt states pain is a pulling sensation in the posterior shoulder and proximal arm. Pt reports difficulty with weight bearing through LUE during transfers, and pain while leaning forward as if tying shoes. Pt additionally reports tingling in the 4th and 5th digits of the L hand. Pt states increased weakness in LUE and BLE following CVA. Pt with no specific aggravating factors. Pt is R hand dominant. Pt with difficulty sleeping at night, and is unable to sleep on L shoulder. Pt reports he rides an [employer] bike for exercise several times per week.       DOI: 1 week ago   Imaging, none  Pain: current 1/10, worst 10/10, best 1/10, Throbbing, intermittent  Radicular symptoms: Numbness and tingling in ring and pinky fingers of L hand   Aggravating factors: bending elbow  Easing factors: Rest   Pts goals: Reduce pain and return to PLOF     Objective   Mental status: alert, interactive, oriented x3  Posture/ Alignment: Protruded Head, Protracted Scapula, increased thoracic kyphosis, forward trunk lean     Movement Analysis: Pt requires additional time to complete bed mobility and transfers.     GAIT DEVIATIONS: Roby  amb with decreased servando.    ROM:     Cervical Range of Motion:    Degrees Pain   Flexion 50      Extension 10      Right Rotation 50      Left Rotation 55      Right Side Bending 15    Left Side Bending 20       Shoulder Range of Motion:   Shoulder Left Right   Flexion 135 145   Abduction 165 165   ER WNL WNL   IR WNL WNL     Elbow P/AROM within functional limits and asymptomatic.     Strength:      Right Left Comment   Shoulder flexion: 4+/5 4+/5    Shoulder Abduction: 4+/5 4-/5    Elbow Flexion: 5/5 4+/5    Elbow Extension: 5/5 4+/5     4+/5 4+/5          Special Tests:  - Neers: left negative  - Patel-Juve: left negative    Neurovascular:  - Sensation: Grossly intact to light touch across BUE    - Neural Tension: ULTT (-) LUE Median/Ulnar nerve bias     Palpation:  Pt without TTP at posterior RTC musculature, triceps, biceps, anterior GH joint, and surrounding soft tissue structures. Pt with atrophy of LUE major muscle groups compared to RUE.     Joint Play:  Hypomobile L GH joint in inferior and posterior planes       Pt/family was provided educational information, including: role of PT, goals for PT, scheduling - pt verbalized understanding. Discussed insurance plan with pt.     TREATMENT   Time In: 2:54 PM  Time Out: 3:50 PM    Discussed Plan of Care with patient: Yes    Roby received 10 minutes of therapeutic exercises including:   Cervical Rotation over towel roll x 10   Seated Scap Retraction 2 x 10   Bicep Curls RTB 2 x 10         Written Home Exercises Provided: yes   Exercises were reviewed with Roby and his daughter and Roby was able to demonstrate them prior to the end of the session. Pt received a written copy of exercises to perform at home. Roby demonstrated good  understanding of the education provided.     Assessment   Roby is a 88 y.o. male referred to outpatient physical therapy with a medical diagnosis of L arm pain. Pt demonstrates decreased AROM of L shoulder and  cervical spine, muscle weakness of LUE, and hypomobility of L glenohumeral joint. Pt is a good candidate for skilled therapy services at this time to improve functional mobility and strength of LUE, so he may return to PLOF and independently manage symptoms. Pt prognosis is Good. Positive prognostic factors include motivation for therapy services. Negative prognostic factors include severity of symptoms. Pt will benefit from skilled outpatient physical therapy to address the above stated deficits, provide pt/family education, and to maximize pt's level of independence.     History  Co-morbidities and personal factors that may impact the plan of care Examination  Body Structures and Functions, activity limitations and participation restrictions that may impact the plan of care    Clinical Presentation   Co-morbidities:   advanced age and history of CVA        Personal Factors:   no deficits Body Regions:   upper extremities    Body Systems:   gross symmetry  ROM  strength        Participation Restrictions:   Pt with no participation restrictions     Activity limitations:   Learning and applying knowledge  no deficits    General Tasks and Commands  no deficits    Communication  no deficits    Mobility  no deficits    Self care  no deficits    Domestic Life  no deficits    Interactions/Relationships  no deficits    Life Areas  no deficits    Community and Social Life  no deficits         stable and uncomplicated                      low   moderate  low Decision Making/ Complexity Score:  low     CMS Impairment/Limitation/Restriction for FOTO Upper Arm Survey    Status   Limitation   Intake   64%   36%   Predicted  71%   29%     G-Code   CMS Severity Modifier  Current Status  CJ - At least 20 percent but less than 40 percent  Goal Status+   CJ - At least 20 percent but less than 40 percent    Pt's spiritual, cultural and educational needs considered and pt agreeable to plan of care and goals as stated below:      Anticipated Barriers for physical therapy: none     Long Term GOALS:  In 6 weeks, pt. Will:  1. Report L arm pain to </= 5/10 at worst to increase tolerance for ADLs  2. Report >/= 30% decrease in difficulty dressing to improve efficiency and independence in activity   3. Be independent with HEP and SX management     Plan   Outpatient physical therapy 1 - 2  times weekly to include: pt ed, HEP, therapeutic exercises, therapeutic activities, neuromuscular re-education/ balance exercises, manual therapy, dry needling, and modalities prn. Cont PT for 4 - 6 weeks. Pt may be seen by PTA as part of the rehabilitation team.     I certify the need for these services furnished under this plan of treatment and while under my care.    Monserrat Lamas, PT

## 2018-12-20 ENCOUNTER — TELEPHONE (OUTPATIENT)
Dept: URGENT CARE | Facility: CLINIC | Age: 83
End: 2018-12-20

## 2018-12-27 ENCOUNTER — CLINICAL SUPPORT (OUTPATIENT)
Dept: REHABILITATION | Facility: HOSPITAL | Age: 83
End: 2018-12-27
Attending: FAMILY MEDICINE
Payer: MEDICARE

## 2018-12-27 DIAGNOSIS — M79.602 LEFT ARM PAIN: ICD-10-CM

## 2018-12-27 DIAGNOSIS — M62.81 MUSCLE WEAKNESS: ICD-10-CM

## 2018-12-27 PROCEDURE — 97110 THERAPEUTIC EXERCISES: CPT | Mod: PN

## 2018-12-27 NOTE — PROGRESS NOTES
"Name: Roby Rodriguez  Clinic Number: [id number]  Date of Treatment: 12/27/2018   Diagnosis:   Encounter Diagnoses   Name Primary?    Muscle weakness     Left arm pain        Physician: Ethan Melendez, *    Time in: 11:00 AM  Time Out: 11:40 AM  Total Treatment Time: 40 min   Last G-code: Eval   Last PN: NA  Date of eval: 12/19/2018  Visit #: 2/12  Auth expiration: 01/01/2020  POC expiration: 02/19/2019    Precautions: standard     Subjective     Roby reports he was in a lot of pain following the evaluation, but has not had any pain since.  Patient reports their pain to be 0/10 on a 0-10 scale with 0 being no pain and 10 being the worst pain imaginable.    Objective     Roby received therapeutic exercises to develop strength, endurance, ROM, flexibility and posture for 35 minutes including:   PROM - cervical/shoulder  Ulnar Nerve Glides - passive PT assist   Cervical Rotation over towel roll x 10   Seated Scap Retraction 2 x 10   Bicep Curls RTB 2 x 10  Shoulder Extension RTB 3 x 10   Tricep Extension RTB 3 x 10   Upper Trap Stretch 2 x 30"     Roby received the following manual therapy techniques: Soft tissue Mobilization were applied to the: shoulder girdle for 5 minutes.   Lateral telescoping     Written Home Exercises Provided: No, pt instructed to continue with previously issued HEP    Pt educated on new therex, soreness after therapy. Pt demo good understanding of the education provided. Roby demonstrated good return demonstration of activities.     Assessment   Roby participated in today's treatment session without symptom provocation or adverse effects. Pt demonstrated improved cervical rotation over towel since prior treatment. Pt with relief of symptoms with PROM and manual therapy treatment. Pt required cueing for appropriate form and posture during therex. Will continue to monitor and progress as tolerated. Pt will continue to benefit from skilled PT intervention. Medical Necessity " is demonstrated by:  Pain limits function of effected part for some activities, Requires skilled supervision to complete and progress HEP and Weakness.    Patient is making good progress towards established goals.    New/Revised goals: none at this time    Long Term GOALS:  In 6 weeks, pt. Will:  1. Report L arm pain to </= 5/10 at worst to increase tolerance for ADLs  2. Report >/= 30% decrease in difficulty dressing to improve efficiency and independence in activity   3. Be independent with HEP and SX management    Plan   Continue with established Plan of Care towards PT goals.     Monserrat Lamas, PT

## 2019-01-01 ENCOUNTER — HOSPITAL ENCOUNTER (OUTPATIENT)
Dept: RADIOLOGY | Facility: HOSPITAL | Age: 84
Discharge: HOME | End: 2019-12-16
Attending: INTERNAL MEDICINE
Payer: MEDICARE

## 2019-01-01 ENCOUNTER — PATIENT OUTREACH (OUTPATIENT)
Dept: ADMINISTRATIVE | Facility: OTHER | Age: 84
End: 2019-01-01

## 2019-01-01 ENCOUNTER — OFFICE VISIT (OUTPATIENT)
Dept: HEPATOLOGY | Facility: CLINIC | Age: 84
End: 2019-01-01
Payer: MEDICARE

## 2019-01-01 ENCOUNTER — OFFICE VISIT (OUTPATIENT)
Dept: DERMATOLOGY | Facility: CLINIC | Age: 84
End: 2019-01-01
Payer: MEDICARE

## 2019-01-01 ENCOUNTER — TELEPHONE (OUTPATIENT)
Dept: HEPATOLOGY | Facility: CLINIC | Age: 84
End: 2019-01-01

## 2019-01-01 ENCOUNTER — PATIENT MESSAGE (OUTPATIENT)
Dept: INTERNAL MEDICINE | Facility: CLINIC | Age: 84
End: 2019-01-01

## 2019-01-01 ENCOUNTER — OFFICE VISIT (OUTPATIENT)
Dept: INTERNAL MEDICINE | Facility: CLINIC | Age: 84
End: 2019-01-01
Payer: MEDICARE

## 2019-01-01 ENCOUNTER — IMMUNIZATION (OUTPATIENT)
Dept: PHARMACY | Facility: CLINIC | Age: 84
End: 2019-01-01
Payer: MEDICARE

## 2019-01-01 ENCOUNTER — TELEPHONE (OUTPATIENT)
Dept: ELECTROPHYSIOLOGY | Facility: CLINIC | Age: 84
End: 2019-01-01

## 2019-01-01 VITALS
OXYGEN SATURATION: 99 % | BODY MASS INDEX: 26 KG/M2 | RESPIRATION RATE: 18 BRPM | DIASTOLIC BLOOD PRESSURE: 64 MMHG | HEIGHT: 73 IN | TEMPERATURE: 98 F | SYSTOLIC BLOOD PRESSURE: 115 MMHG | WEIGHT: 196.19 LBS | HEART RATE: 62 BPM

## 2019-01-01 VITALS
BODY MASS INDEX: 25.2 KG/M2 | SYSTOLIC BLOOD PRESSURE: 121 MMHG | DIASTOLIC BLOOD PRESSURE: 82 MMHG | HEART RATE: 64 BPM | WEIGHT: 191 LBS

## 2019-01-01 VITALS — WEIGHT: 191 LBS | BODY MASS INDEX: 25.31 KG/M2 | HEIGHT: 73 IN

## 2019-01-01 DIAGNOSIS — E55.9 VITAMIN D DEFICIENCY: ICD-10-CM

## 2019-01-01 DIAGNOSIS — L81.4 SOLAR LENTIGO: ICD-10-CM

## 2019-01-01 DIAGNOSIS — E78.2 MIXED HYPERLIPIDEMIA: ICD-10-CM

## 2019-01-01 DIAGNOSIS — I10 HYPERTENSION, UNSPECIFIED TYPE: Primary | ICD-10-CM

## 2019-01-01 DIAGNOSIS — Z95.0 PACEMAKER: ICD-10-CM

## 2019-01-01 DIAGNOSIS — L72.0 MILIA: ICD-10-CM

## 2019-01-01 DIAGNOSIS — K74.69 CRYPTOGENIC CIRRHOSIS: Primary | ICD-10-CM

## 2019-01-01 DIAGNOSIS — L82.1 SEBORRHEIC KERATOSES: ICD-10-CM

## 2019-01-01 DIAGNOSIS — I10 ESSENTIAL HYPERTENSION: ICD-10-CM

## 2019-01-01 DIAGNOSIS — E78.5 HYPERLIPIDEMIA, UNSPECIFIED HYPERLIPIDEMIA TYPE: ICD-10-CM

## 2019-01-01 DIAGNOSIS — Z85.828 HISTORY OF NONMELANOMA SKIN CANCER: ICD-10-CM

## 2019-01-01 DIAGNOSIS — L57.0 ACTINIC KERATOSES: Primary | ICD-10-CM

## 2019-01-01 DIAGNOSIS — I63.9 CEREBROVASCULAR ACCIDENT (CVA), UNSPECIFIED MECHANISM: ICD-10-CM

## 2019-01-01 DIAGNOSIS — I48.0 PAF (PAROXYSMAL ATRIAL FIBRILLATION): Primary | ICD-10-CM

## 2019-01-01 DIAGNOSIS — R79.89 ELEVATED LIVER FUNCTION TESTS: ICD-10-CM

## 2019-01-01 DIAGNOSIS — K74.69 CRYPTOGENIC CIRRHOSIS: ICD-10-CM

## 2019-01-01 PROCEDURE — 17003 DESTRUCT PREMALG LES 2-14: CPT | Mod: S$PBB,,, | Performed by: DERMATOLOGY

## 2019-01-01 PROCEDURE — 76705 US LIVER WITH DOPPLER: ICD-10-PCS | Mod: 26,59,, | Performed by: INTERNAL MEDICINE

## 2019-01-01 PROCEDURE — 17003 DESTRUCTION, PREMALIGNANT LESIONS; SECOND THROUGH 14 LESIONS: ICD-10-PCS | Mod: S$PBB,,, | Performed by: DERMATOLOGY

## 2019-01-01 PROCEDURE — 99213 OFFICE O/P EST LOW 20 MIN: CPT | Mod: S$PBB,,, | Performed by: INTERNAL MEDICINE

## 2019-01-01 PROCEDURE — 99213 OFFICE O/P EST LOW 20 MIN: CPT | Mod: PBBFAC,PO | Performed by: DERMATOLOGY

## 2019-01-01 PROCEDURE — 99213 PR OFFICE/OUTPT VISIT, EST, LEVL III, 20-29 MIN: ICD-10-PCS | Mod: S$PBB,,, | Performed by: INTERNAL MEDICINE

## 2019-01-01 PROCEDURE — 93975 US LIVER WITH DOPPLER: ICD-10-PCS | Mod: 26,,, | Performed by: INTERNAL MEDICINE

## 2019-01-01 PROCEDURE — 99999 PR PBB SHADOW E&M-EST. PATIENT-LVL V: ICD-10-PCS | Mod: PBBFAC,,, | Performed by: INTERNAL MEDICINE

## 2019-01-01 PROCEDURE — 99999 PR PBB SHADOW E&M-EST. PATIENT-LVL III: ICD-10-PCS | Mod: PBBFAC,,, | Performed by: DERMATOLOGY

## 2019-01-01 PROCEDURE — 99212 OFFICE O/P EST SF 10 MIN: CPT | Mod: PBBFAC | Performed by: INTERNAL MEDICINE

## 2019-01-01 PROCEDURE — 99213 OFFICE O/P EST LOW 20 MIN: CPT | Mod: 25,S$PBB,, | Performed by: DERMATOLOGY

## 2019-01-01 PROCEDURE — 17000 PR DESTRUCTION(LASER SURGERY,CRYOSURGERY,CHEMOSURGERY),PREMALIGNANT LESIONS,FIRST LESION: ICD-10-PCS | Mod: S$PBB,,, | Performed by: DERMATOLOGY

## 2019-01-01 PROCEDURE — 99215 OFFICE O/P EST HI 40 MIN: CPT | Mod: PBBFAC | Performed by: INTERNAL MEDICINE

## 2019-01-01 PROCEDURE — 17003 DESTRUCT PREMALG LES 2-14: CPT | Mod: PBBFAC,PO | Performed by: DERMATOLOGY

## 2019-01-01 PROCEDURE — 99214 PR OFFICE/OUTPT VISIT, EST, LEVL IV, 30-39 MIN: ICD-10-PCS | Mod: S$PBB,,, | Performed by: INTERNAL MEDICINE

## 2019-01-01 PROCEDURE — 99999 PR PBB SHADOW E&M-EST. PATIENT-LVL III: CPT | Mod: PBBFAC,,, | Performed by: DERMATOLOGY

## 2019-01-01 PROCEDURE — 17000 DESTRUCT PREMALG LESION: CPT | Mod: PBBFAC,PO | Performed by: DERMATOLOGY

## 2019-01-01 PROCEDURE — 93975 VASCULAR STUDY: CPT | Mod: TC

## 2019-01-01 PROCEDURE — 99999 PR PBB SHADOW E&M-EST. PATIENT-LVL V: CPT | Mod: PBBFAC,,, | Performed by: INTERNAL MEDICINE

## 2019-01-01 PROCEDURE — 17000 DESTRUCT PREMALG LESION: CPT | Mod: S$PBB,,, | Performed by: DERMATOLOGY

## 2019-01-01 PROCEDURE — 99999 PR PBB SHADOW E&M-EST. PATIENT-LVL II: ICD-10-PCS | Mod: PBBFAC,,, | Performed by: INTERNAL MEDICINE

## 2019-01-01 PROCEDURE — 76705 ECHO EXAM OF ABDOMEN: CPT | Mod: 26,59,, | Performed by: INTERNAL MEDICINE

## 2019-01-01 PROCEDURE — 99999 PR PBB SHADOW E&M-EST. PATIENT-LVL II: CPT | Mod: PBBFAC,,, | Performed by: INTERNAL MEDICINE

## 2019-01-01 PROCEDURE — 93975 VASCULAR STUDY: CPT | Mod: 26,,, | Performed by: INTERNAL MEDICINE

## 2019-01-01 PROCEDURE — 99213 PR OFFICE/OUTPT VISIT, EST, LEVL III, 20-29 MIN: ICD-10-PCS | Mod: 25,S$PBB,, | Performed by: DERMATOLOGY

## 2019-01-01 PROCEDURE — 99214 OFFICE O/P EST MOD 30 MIN: CPT | Mod: S$PBB,,, | Performed by: INTERNAL MEDICINE

## 2019-01-01 RX ORDER — SPIRONOLACTONE 50 MG/1
50 TABLET, FILM COATED ORAL DAILY
Qty: 90 TABLET | Refills: 3 | Status: SHIPPED | OUTPATIENT
Start: 2019-01-01 | End: 2020-01-01 | Stop reason: SDUPTHER

## 2019-01-01 RX ORDER — LISINOPRIL 20 MG/1
TABLET ORAL
Qty: 180 TABLET | Refills: 1 | Status: SHIPPED | OUTPATIENT
Start: 2019-01-01 | End: 2020-01-01 | Stop reason: SDUPTHER

## 2019-01-01 RX ORDER — LORATADINE 10 MG/1
10 TABLET ORAL DAILY
COMMUNITY
End: 2020-01-01 | Stop reason: CLARIF

## 2019-01-03 ENCOUNTER — CLINICAL SUPPORT (OUTPATIENT)
Dept: REHABILITATION | Facility: HOSPITAL | Age: 84
End: 2019-01-03
Attending: FAMILY MEDICINE
Payer: MEDICARE

## 2019-01-03 DIAGNOSIS — M79.602 LEFT ARM PAIN: ICD-10-CM

## 2019-01-03 DIAGNOSIS — M62.81 MUSCLE WEAKNESS: ICD-10-CM

## 2019-01-03 PROBLEM — I63.442 EMBOLIC STROKE INVOLVING LEFT CEREBELLAR ARTERY: Status: RESOLVED | Noted: 2017-03-31 | Resolved: 2019-01-03

## 2019-01-03 PROBLEM — R29.898 LEFT LEG WEAKNESS: Status: RESOLVED | Noted: 2017-04-10 | Resolved: 2019-01-03

## 2019-01-03 PROCEDURE — 97110 THERAPEUTIC EXERCISES: CPT | Mod: PN

## 2019-01-03 NOTE — PROGRESS NOTES
"Name: Roby Rodriguez  Clinic Number: [id number]  Date of Treatment: 01/03/2019   Diagnosis:   Encounter Diagnoses   Name Primary?    Muscle weakness     Left arm pain        Physician: Ethan Melendez, *  /  Time in: 11:00 AM  Time Out: 11:40 AM  Total Treatment Time: 40 min   Last G-code: Svetlana   Last PN: NA  Date of eval: 12/19/2018  Visit #: 3/12  Auth expiration: 01/01/2020  POC expiration: 02/19/2019    Precautions: standard     Subjective     Roby reports he is now able to sleep on his L side throughout the night. Pt reports improvement in severe shoulder pain, but continues to have some numbness/tingling in ulnar nerve distribution of hand.   Patient reports their pain to be 0/10 on a 0-10 scale with 0 being no pain and 10 being the worst pain imaginable.    Objective     Roby received therapeutic exercises to develop strength, endurance, ROM, flexibility and posture for 35 minutes including:   PROM - cervical/shoulder  Ulnar Nerve Glides - passive PT assist   Cervical Rotation over towel roll x 10   Seated Scap Retraction 2 x 10   Bicep Curls RTB 2 x 10  Shoulder Extension RTB 3 x 10   Tricep Extension RTB 3 x 10   Upper Trap Stretch 2 x 30"     Roby received the following manual therapy techniques: Soft tissue Mobilization were applied to the: shoulder girdle for 5 minutes.   Lateral telescoping     Written Home Exercises Provided: No, pt instructed to continue with previously issued HEP    Pt educated on new therex, soreness after therapy. Pt demo good understanding of the education provided. Roby demonstrated good return demonstration of activities.     Assessment   Roby participated in today's treatment session without symptom provocation or adverse effects. Pt demonstrated improved independence and tolerance for therex compared to previous treatment session. Pt appears compliant with HEP due to recognition and independence of exercises performed today. Pt requires assistance and " cueing to maintain upright posture and focus on task. Pt demonstrated palpable taut band throughout L levator scap during manual therapy treatment. Will continue to monitor and progress as tolerated. Pt will continue to benefit from skilled PT intervention. Medical Necessity is demonstrated by:  Pain limits function of effected part for some activities, Requires skilled supervision to complete and progress HEP and Weakness.    Patient is making good progress towards established goals.    New/Revised goals: none at this time    Long Term GOALS:  In 6 weeks, pt. Will:  1. Report L arm pain to </= 5/10 at worst to increase tolerance for ADLs  2. Report >/= 30% decrease in difficulty dressing to improve efficiency and independence in activity   3. Be independent with HEP and SX management    Plan   Continue with established Plan of Care towards PT goals.     Monserrat Lamas, PT

## 2019-01-10 ENCOUNTER — CLINICAL SUPPORT (OUTPATIENT)
Dept: REHABILITATION | Facility: HOSPITAL | Age: 84
End: 2019-01-10
Attending: FAMILY MEDICINE
Payer: MEDICARE

## 2019-01-10 DIAGNOSIS — M79.602 LEFT ARM PAIN: ICD-10-CM

## 2019-01-10 DIAGNOSIS — M62.81 MUSCLE WEAKNESS: ICD-10-CM

## 2019-01-10 PROCEDURE — G8978 MOBILITY CURRENT STATUS: HCPCS | Mod: CI,PN

## 2019-01-10 PROCEDURE — G8980 MOBILITY D/C STATUS: HCPCS | Mod: CJ,PN

## 2019-01-10 PROCEDURE — G8979 MOBILITY GOAL STATUS: HCPCS | Mod: CI,PN

## 2019-01-10 PROCEDURE — 97110 THERAPEUTIC EXERCISES: CPT | Mod: PN

## 2019-01-10 NOTE — PROGRESS NOTES
Name: Roby Rodriguez  Clinic Number: [id number]  Date of Treatment: 01/10/2019   Diagnosis:   Encounter Diagnoses   Name Primary?    Muscle weakness     Left arm pain        Physician: Ethan Melendez, *    Time in: 11:00 AM  Time Out: 11:50 AM  Total Treatment Time: 50 min   Last G-code: Eval   Last PN: NA  Date of eval: 12/19/2018  Visit #:4/12  Auth expiration: 01/01/2020  POC expiration: 02/19/2019    Precautions: standard     Subjective     Roby reports he is doing very good. Pt reports he no longer has pain into his left arm. Pt states he occasionally has pain into the ulnar distribution of his L hand, but has had that since the stroke.  Patient reports their pain to be 0/10 on a 0-10 scale with 0 being no pain and 10 being the worst pain imaginable.    Objective     Posture/ Alignment: Protruded Head, Protracted Scapula, increased thoracic kyphosis, forward trunk lean      Movement Analysis: Pt requires additional time to complete bed mobility and transfers.      GAIT DEVIATIONS: Roby amb with decreased servando.     ROM:      Cervical Range of Motion:     Degrees Pain   Flexion 60        Extension 20        Right Rotation 60        Left Rotation 50        Right Side Bending 20     Left Side Bending 20        Shoulder Range of Motion:   Shoulder Left Right   Flexion 135 145   Abduction 165 165   ER WNL WNL   IR WNL WNL      Elbow P/AROM within functional limits and asymptomatic.      Strength:        Right Left Comment   Shoulder flexion: 4+/5 4+/5     Shoulder Abduction: 4+/5 4+/5     Elbow Flexion: 5/5 5/5     Elbow Extension: 5/5 5/5      5/5 5/5            Special Tests:  - Neers: left negative  - Patel-Juve: left negative     Neurovascular:  - Sensation: Grossly intact to light touch across BUE     - Neural Tension: ULTT (-) LUE Median/Ulnar nerve bias      Palpation:  Pt without TTP at posterior RTC musculature, triceps, biceps, anterior GH joint, and surrounding soft tissue  "structures.       Joint Play:  Hypomobile L GH joint in inferior and posterior planes     Roby received therapeutic exercises to develop strength, endurance, ROM, flexibility and posture for 40 minutes including:   PROM - cervical/shoulder  Ulnar Nerve Glides - passive PT assist   Cervical Rotation over towel roll x 10   Seated Scap Retraction 2 x 10   Bicep Curls RTB 2 x 10  Shoulder Extension RTB 3 x 10   Tricep Extension RTB 3 x 10   Upper Trap Stretch 2 x 30"     Roby received the following manual therapy techniques: Soft tissue Mobilization were applied to the: shoulder girdle for 5 minutes.   Lateral telescoping     Written Home Exercises Provided: No, pt instructed to continue with previously issued HEP    Pt educated on new therex, soreness after therapy. Pt demo good understanding of the education provided. Roby demonstrated good return demonstration of activities.     Assessment   Roby participated in today's treatment session without symptom provocation or adverse effects. Pt demonstrated mild muscle tightness to L upper trapezius improved with manual therapy treatment. Pt independent in therex and mostly independent in HEP with assist from daughter. Pt demonstrated improvements in cervical mobility and UE strength since beginning therapy services. Pt appropriate for discharge at this time to independent HEP for management of symptoms. Patient was seen for 4 outpatient PT visits from 12/19/2018 to 1/10/2019. Treatment included: evaluation, HEP, pt education, manual therapy, and there ex. Pt has met all goals. Continue HEP. This patient is discharged from outpatient PT Services.    Long Term GOALS:  In 6 weeks, pt. Will:  1. Report L arm pain to </= 5/10 at worst to increase tolerance for ADLs (MET 01/10/2019)  2. Report >/= 30% decrease in difficulty dressing to improve efficiency and independence in activity  (MET 01/10/2019)  3. Be independent with HEP and SX management (MET " 01/10/2019)    CMS Impairment/Limitation/Restriction for FOTO Upper Arm Survey    Status   Limitation   Intake  64%   36%  Predicted  71%   29%   1/10/2019  88%   12%     G-Code   CMS Severity Modifier  Current Status  CI - At least 1 percent but less than 20 percent  Goal Status+   CJ - At least 20 percent but less than 40 percent  D/C Status   CI - At least 1 percent but less than 20 percent    Plan   Pt is discharged from skilled therapy services.    Monserrat Lamas, PT

## 2019-01-14 ENCOUNTER — CLINICAL SUPPORT (OUTPATIENT)
Dept: CARDIOLOGY | Facility: CLINIC | Age: 84
End: 2019-01-14
Attending: INTERNAL MEDICINE
Payer: MEDICARE

## 2019-01-14 ENCOUNTER — TELEPHONE (OUTPATIENT)
Dept: FAMILY MEDICINE | Facility: CLINIC | Age: 84
End: 2019-01-14

## 2019-01-14 ENCOUNTER — OFFICE VISIT (OUTPATIENT)
Dept: CARDIOLOGY | Facility: CLINIC | Age: 84
End: 2019-01-14
Payer: MEDICARE

## 2019-01-14 VITALS
HEIGHT: 73 IN | DIASTOLIC BLOOD PRESSURE: 82 MMHG | WEIGHT: 204.56 LBS | SYSTOLIC BLOOD PRESSURE: 134 MMHG | BODY MASS INDEX: 27.11 KG/M2 | HEART RATE: 85 BPM

## 2019-01-14 DIAGNOSIS — I48.0 PAROXYSMAL ATRIAL FIBRILLATION: Primary | ICD-10-CM

## 2019-01-14 DIAGNOSIS — I10 ESSENTIAL HYPERTENSION: ICD-10-CM

## 2019-01-14 DIAGNOSIS — Z95.0 PACEMAKER: ICD-10-CM

## 2019-01-14 DIAGNOSIS — I49.5 TACHY-BRADY SYNDROME: ICD-10-CM

## 2019-01-14 DIAGNOSIS — I49.1 PREMATURE ATRIAL COMPLEXES: ICD-10-CM

## 2019-01-14 DIAGNOSIS — R00.1 BRADYCARDIA: ICD-10-CM

## 2019-01-14 DIAGNOSIS — I63.9 COMPLETED STROKE: ICD-10-CM

## 2019-01-14 DIAGNOSIS — I49.3 PREMATURE VENTRICULAR CONTRACTIONS (PVCS) (VPCS): ICD-10-CM

## 2019-01-14 PROCEDURE — 99999 PR PBB SHADOW E&M-EST. PATIENT-LVL III: CPT | Mod: PBBFAC,,, | Performed by: INTERNAL MEDICINE

## 2019-01-14 PROCEDURE — 99999 PR PBB SHADOW E&M-EST. PATIENT-LVL III: ICD-10-PCS | Mod: PBBFAC,,, | Performed by: INTERNAL MEDICINE

## 2019-01-14 PROCEDURE — 99213 OFFICE O/P EST LOW 20 MIN: CPT | Mod: S$PBB,,, | Performed by: INTERNAL MEDICINE

## 2019-01-14 PROCEDURE — 99213 PR OFFICE/OUTPT VISIT, EST, LEVL III, 20-29 MIN: ICD-10-PCS | Mod: S$PBB,,, | Performed by: INTERNAL MEDICINE

## 2019-01-14 PROCEDURE — 99213 OFFICE O/P EST LOW 20 MIN: CPT | Mod: PBBFAC,PO | Performed by: INTERNAL MEDICINE

## 2019-01-14 NOTE — TELEPHONE ENCOUNTER
----- Message from Vivi Brown sent at 1/14/2019  3:02 PM CST -----  Contact: Pt daughter (Erika)   Pt daughter (Erika) is requesting a call back in regards to scheduling pt a new pt appt. I informed the pt that Dr. Swartz was not accepting new pt however she insisted that the doctor will take her father as a np.         Pt daughter Giorgi) can be contacted at [phone]

## 2019-01-14 NOTE — PROGRESS NOTES
Subjective:    Patient ID:  Roby Rodriguez is a 88 y.o. male who presents for follow-up of PAF (6 month f/u )      HPI 87 yo male with Htn, PAC's, PVC's, tachy-kunal syndrome, PPM, CVA.     H/o recurrent dizziness, and atrial arrhythmias.  Holter monitor obtained, revealed nsr with >1900 PVC's and > 4500 PAC's, with runs of nonsustained AT   Event monitor: one symptomatic transmission. Revealed nsr >> atrial fibrillation.  Dual chamber PPM implanted 7/22/13.   Subsequent device interrogation revealed atrial fibrillation with RVR. Toprol increased, coumadin initiated. Noted significant improvement in terms of dizziness and energy level.  Presented 3/31/17 with stroke.  Thought to be cardio-embolic in origin.  INR subtherapeutic at 1.7 upon admission.  Aspirin added.  We discontinued aspirin and coumadin, initiated Xarelto.    Device interrogation reveals stable function of leads, RA pacing 90% RV pacing <1%, no significant arrhythmias.     Doing well overall.  Remains active with his travels.      Review of Systems   Constitution: Negative. Negative for weakness and malaise/fatigue.   Cardiovascular: Negative for chest pain, dyspnea on exertion, irregular heartbeat, leg swelling, near-syncope, orthopnea, palpitations, paroxysmal nocturnal dyspnea and syncope.   Respiratory: Negative for cough and shortness of breath.    Neurological: Negative for dizziness and light-headedness.   All other systems reviewed and are negative.       Objective:    Physical Exam   Constitutional: He is oriented to person, place, and time. He appears well-developed and well-nourished.   Eyes: Conjunctivae are normal. No scleral icterus.   Neck: No JVD present. No tracheal deviation present.   Cardiovascular: Normal rate, regular rhythm and normal heart sounds. PMI is not displaced.   Pulmonary/Chest: Effort normal and breath sounds normal. No respiratory distress.   Abdominal: Soft. There is no hepatosplenomegaly. There is no tenderness.    Musculoskeletal: He exhibits no edema (lower extremity) or tenderness.   Neurological: He is alert and oriented to person, place, and time.   Skin: Skin is warm and dry. No rash noted.   Psychiatric: He has a normal mood and affect. His behavior is normal.         Assessment:       1. Paroxysmal atrial fibrillation    2. Pacemaker    3. Essential hypertension    4. Bradycardia    5. Completed stroke    6. Premature ventricular contractions (PVCs) (VPCs)    7. Premature atrial complexes         Plan:           Doing well.  Continue current settings and medications.  F/u with monitoring as scheduled, with me in 6 months.

## 2019-01-16 LAB
AV DELAY - FIXED: 180 MSEC
BATTERY VOLTAGE (V): 2.99 V
IMPEDANCE RA LEAD (NATIVE): 399 OHMS
IMPEDANCE RA LEAD: 437 OHMS
P/R-WAVE RA LEAD (NATIVE): 4.6 MV
P/R-WAVE RA LEAD: 1.1 MV
PV DELAY - FIXED: 150 MSEC
THRESHOLD MS RA LEAD: 0.4 MS
THRESHOLD V RA LEAD: 1 V

## 2019-01-17 ENCOUNTER — LAB VISIT (OUTPATIENT)
Dept: LAB | Facility: HOSPITAL | Age: 84
End: 2019-01-17
Attending: INTERNAL MEDICINE
Payer: MEDICARE

## 2019-01-17 DIAGNOSIS — E44.1 MILD PROTEIN-CALORIE MALNUTRITION: ICD-10-CM

## 2019-01-17 DIAGNOSIS — E83.42 HYPOMAGNESEMIA: ICD-10-CM

## 2019-01-17 DIAGNOSIS — Z78.9 ALCOHOL USE: ICD-10-CM

## 2019-01-17 DIAGNOSIS — N20.0 KIDNEY STONES: ICD-10-CM

## 2019-01-17 DIAGNOSIS — E78.2 MIXED HYPERLIPIDEMIA: ICD-10-CM

## 2019-01-17 DIAGNOSIS — I48.0 PAROXYSMAL ATRIAL FIBRILLATION: ICD-10-CM

## 2019-01-17 DIAGNOSIS — R60.0 BILATERAL LEG EDEMA: ICD-10-CM

## 2019-01-17 DIAGNOSIS — E03.9 BORDERLINE HYPOTHYROIDISM: ICD-10-CM

## 2019-01-17 DIAGNOSIS — I10 ESSENTIAL HYPERTENSION: ICD-10-CM

## 2019-01-17 DIAGNOSIS — D69.6 THROMBOCYTOPENIA: ICD-10-CM

## 2019-01-17 DIAGNOSIS — D64.9 ANEMIA, UNSPECIFIED TYPE: ICD-10-CM

## 2019-01-17 LAB
ALBUMIN SERPL BCP-MCNC: 3 G/DL
ALP SERPL-CCNC: 137 U/L
ALT SERPL W/O P-5'-P-CCNC: 31 U/L
ANION GAP SERPL CALC-SCNC: 6 MMOL/L
AST SERPL-CCNC: 42 U/L
BASOPHILS # BLD AUTO: 0.03 K/UL
BASOPHILS NFR BLD: 0.5 %
BILIRUB SERPL-MCNC: 1 MG/DL
BNP SERPL-MCNC: 313 PG/ML
BUN SERPL-MCNC: 20 MG/DL
CALCIUM SERPL-MCNC: 9.1 MG/DL
CHLORIDE SERPL-SCNC: 108 MMOL/L
CHOLEST SERPL-MCNC: 135 MG/DL
CHOLEST/HDLC SERPL: 2.6 {RATIO}
CO2 SERPL-SCNC: 23 MMOL/L
CREAT SERPL-MCNC: 0.9 MG/DL
DIFFERENTIAL METHOD: ABNORMAL
EOSINOPHIL # BLD AUTO: 0.3 K/UL
EOSINOPHIL NFR BLD: 4.9 %
ERYTHROCYTE [DISTWIDTH] IN BLOOD BY AUTOMATED COUNT: 14.5 %
EST. GFR  (AFRICAN AMERICAN): >60 ML/MIN/1.73 M^2
EST. GFR  (NON AFRICAN AMERICAN): >60 ML/MIN/1.73 M^2
GGT SERPL-CCNC: 184 U/L
GLUCOSE SERPL-MCNC: 84 MG/DL
HCT VFR BLD AUTO: 39.5 %
HDLC SERPL-MCNC: 52 MG/DL
HDLC SERPL: 38.5 %
HGB BLD-MCNC: 12.9 G/DL
IMM GRANULOCYTES # BLD AUTO: 0.02 K/UL
IMM GRANULOCYTES NFR BLD AUTO: 0.3 %
LDLC SERPL CALC-MCNC: 70 MG/DL
LYMPHOCYTES # BLD AUTO: 1.7 K/UL
LYMPHOCYTES NFR BLD: 27.2 %
MAGNESIUM SERPL-MCNC: 1.8 MG/DL
MCH RBC QN AUTO: 33.3 PG
MCHC RBC AUTO-ENTMCNC: 32.7 G/DL
MCV RBC AUTO: 102 FL
MONOCYTES # BLD AUTO: 0.7 K/UL
MONOCYTES NFR BLD: 11.3 %
NEUTROPHILS # BLD AUTO: 3.5 K/UL
NEUTROPHILS NFR BLD: 55.8 %
NONHDLC SERPL-MCNC: 83 MG/DL
NRBC BLD-RTO: 0 /100 WBC
PLATELET # BLD AUTO: 132 K/UL
PMV BLD AUTO: 11.7 FL
POTASSIUM SERPL-SCNC: 3.9 MMOL/L
PREALB SERPL-MCNC: 8 MG/DL
PROT SERPL-MCNC: 6.3 G/DL
RBC # BLD AUTO: 3.87 M/UL
SODIUM SERPL-SCNC: 137 MMOL/L
T3FREE SERPL-MCNC: 2.2 PG/ML
T4 FREE SERPL-MCNC: 0.71 NG/DL
TRIGL SERPL-MCNC: 65 MG/DL
TSH SERPL DL<=0.005 MIU/L-ACNC: 3.34 UIU/ML
WBC # BLD AUTO: 6.35 K/UL

## 2019-01-17 PROCEDURE — 83735 ASSAY OF MAGNESIUM: CPT

## 2019-01-17 PROCEDURE — 80061 LIPID PANEL: CPT

## 2019-01-17 PROCEDURE — 84481 FREE ASSAY (FT-3): CPT

## 2019-01-17 PROCEDURE — 80053 COMPREHEN METABOLIC PANEL: CPT

## 2019-01-17 PROCEDURE — 84439 ASSAY OF FREE THYROXINE: CPT

## 2019-01-17 PROCEDURE — 84443 ASSAY THYROID STIM HORMONE: CPT

## 2019-01-17 PROCEDURE — 85025 COMPLETE CBC W/AUTO DIFF WBC: CPT

## 2019-01-17 PROCEDURE — 36415 COLL VENOUS BLD VENIPUNCTURE: CPT | Mod: PN

## 2019-01-17 PROCEDURE — 83880 ASSAY OF NATRIURETIC PEPTIDE: CPT

## 2019-01-17 PROCEDURE — 82977 ASSAY OF GGT: CPT

## 2019-01-17 PROCEDURE — 84134 ASSAY OF PREALBUMIN: CPT

## 2019-01-24 ENCOUNTER — OFFICE VISIT (OUTPATIENT)
Dept: FAMILY MEDICINE | Facility: CLINIC | Age: 84
End: 2019-01-24
Payer: MEDICARE

## 2019-01-24 VITALS
DIASTOLIC BLOOD PRESSURE: 68 MMHG | BODY MASS INDEX: 26.91 KG/M2 | SYSTOLIC BLOOD PRESSURE: 132 MMHG | RESPIRATION RATE: 18 BRPM | OXYGEN SATURATION: 96 % | TEMPERATURE: 98 F | HEIGHT: 73 IN | WEIGHT: 203.06 LBS | HEART RATE: 67 BPM

## 2019-01-24 DIAGNOSIS — E78.2 MIXED HYPERLIPIDEMIA: ICD-10-CM

## 2019-01-24 DIAGNOSIS — R74.01 TRANSAMINITIS: ICD-10-CM

## 2019-01-24 DIAGNOSIS — Z78.9 ALCOHOL USE: ICD-10-CM

## 2019-01-24 DIAGNOSIS — D69.6 THROMBOCYTOPENIA: ICD-10-CM

## 2019-01-24 DIAGNOSIS — E66.3 OVERWEIGHT (BMI 25.0-29.9): ICD-10-CM

## 2019-01-24 DIAGNOSIS — D64.9 ANEMIA, UNSPECIFIED TYPE: ICD-10-CM

## 2019-01-24 DIAGNOSIS — I48.0 PAROXYSMAL ATRIAL FIBRILLATION: ICD-10-CM

## 2019-01-24 DIAGNOSIS — N20.0 KIDNEY STONES: ICD-10-CM

## 2019-01-24 DIAGNOSIS — Z79.01 LONG TERM CURRENT USE OF ANTICOAGULANT THERAPY: ICD-10-CM

## 2019-01-24 DIAGNOSIS — R60.0 LOWER EXTREMITY EDEMA: ICD-10-CM

## 2019-01-24 DIAGNOSIS — I10 ESSENTIAL HYPERTENSION: Primary | ICD-10-CM

## 2019-01-24 DIAGNOSIS — E44.0 MODERATE MALNUTRITION: ICD-10-CM

## 2019-01-24 PROCEDURE — 99999 PR PBB SHADOW E&M-EST. PATIENT-LVL III: CPT | Mod: PBBFAC,,, | Performed by: INTERNAL MEDICINE

## 2019-01-24 PROCEDURE — 99214 OFFICE O/P EST MOD 30 MIN: CPT | Mod: S$PBB,,, | Performed by: INTERNAL MEDICINE

## 2019-01-24 PROCEDURE — 99213 OFFICE O/P EST LOW 20 MIN: CPT | Mod: PBBFAC,PN | Performed by: INTERNAL MEDICINE

## 2019-01-24 PROCEDURE — 99214 PR OFFICE/OUTPT VISIT, EST, LEVL IV, 30-39 MIN: ICD-10-PCS | Mod: S$PBB,,, | Performed by: INTERNAL MEDICINE

## 2019-01-24 PROCEDURE — 99999 PR PBB SHADOW E&M-EST. PATIENT-LVL III: ICD-10-PCS | Mod: PBBFAC,,, | Performed by: INTERNAL MEDICINE

## 2019-01-24 NOTE — PROGRESS NOTES
Subjective:       Patient ID: Roby Rodriguez is a 88 y.o. male.    Chief Complaint: Follow-up (3 month f/u )    HPI  Overall he's been doing fine.  HTN: BP avr 130/60-79; watches his salt intake. BP manually here 132/68.  Mix HLP: on low fat high fiber diet; 1% milk though. Toleratesa torvastatin fine.  Alcohol use; has decreased from 1 beer or wine a day to 1-2 a week.  Transaminitis; suspect due to alcohol and poss fatty liver;   Anemia w decreased plt. ; likely from alcohol.  GERD: belching controlled w pantoprazole.   Mild PCM; taking Ensure w protein supplements. Mostly daily. Mod malnutrition by prealbumin of only 8.  Kidney stone; keeps well hydrated.   Left shoulder/arm pain has essentially cleared from 12/17/18 Urgent Care visit; please see note; PT/OT cleared him up.  PPM: sees Dr Higgins; saw him last week.  discussexd w pt and daughter Erika.    Review of Systems   Constitutional: Negative for appetite change and unexpected weight change.   HENT: Negative for congestion, postnasal drip, rhinorrhea and sinus pressure.          seasonal allergies,   Eyes: Negative for discharge and itching.   Respiratory: Negative for cough, chest tightness, shortness of breath and wheezing.    Cardiovascular: Negative for chest pain, palpitations and leg swelling.   Gastrointestinal: Negative for abdominal pain, blood in stool, constipation, diarrhea, nausea and vomiting.   Endocrine: Negative for polydipsia, polyphagia and polyuria.   Genitourinary: Negative for dysuria and hematuria.   Musculoskeletal: Negative for arthralgias and myalgias.   Skin: Negative for rash.   Allergic/Immunologic: Negative for environmental allergies and food allergies.   Neurological: Negative for tremors, seizures and headaches.   Hematological: Negative for adenopathy. Does not bruise/bleed easily.   Psychiatric/Behavioral:        Denies anxiety or depression.       Objective:      Vitals:    01/24/19 0942   BP: 132/68   Pulse: 67   Resp:  "18   Temp: 97.5 °F (36.4 °C)   TempSrc: Oral   SpO2: 96%   Weight: 92.1 kg (203 lb 0.7 oz)   Height: 6' 1" (1.854 m)     Body mass index is 26.79 kg/m².    Physical Exam   Constitutional: He is oriented to person, place, and time. He appears well-developed and well-nourished.   HENT:   Head: Normocephalic and atraumatic.   Eyes: EOM are normal.   Neck: Normal range of motion. Neck supple. No thyromegaly present.   Cardiovascular: Normal rate, regular rhythm and normal heart sounds. Exam reveals no gallop.   No murmur heard.  Pulmonary/Chest: Effort normal and breath sounds normal. No respiratory distress. He has no wheezes. He has no rales.   Abdominal: Soft. Bowel sounds are normal. He exhibits no distension. There is no tenderness. There is no rebound and no guarding.   Musculoskeletal: Normal range of motion. He exhibits edema.   2-3+ korina LE edema; no calf tenderness to palp. Spider veins noted.   Lymphadenopathy:     He has no cervical adenopathy.   Neurological: He is alert and oriented to person, place, and time.   Moves all 4 extremities fine.   Skin: No rash noted.   Psychiatric: He has a normal mood and affect. His behavior is normal. Thought content normal.   Vitals reviewed.      Assessment:       1. Essential hypertension    2. Mixed hyperlipidemia    3. Paroxysmal atrial fibrillation    4. Long term current use of anticoagulant therapy    5. Alcohol use    6. Transaminitis    7. Moderate malnutrition    8. Anemia, unspecified type    9. Thrombocytopenia    10. Overweight (BMI 25.0-29.9)    11. Kidney stones    12. Lower extremity edema        Plan:       Essential hypertension. Maintain < 2 Gm Na a day diet, and monitor BP at home; keep a log. Same tx.  -     Comprehensive metabolic panel; Future; Expected date: 01/24/2019    Mixed hyperlipidemia. Maintain low fat high fiber diet, exercise regularly. On atrovastatin    Paroxysmal atrial fibrillation; sees [facility] as cardiology.    Long term current " use of anticoagulant therapy; on Xarelto as per cardiology.    Alcohol use; needs to quit altogether so nutrition improves and anemia. As well as albumin and peripheral edema.  -     Gamma GT; Future; Expected date: 01/24/2019    Transaminitis; wean off alcohol altogether; further evssluation as below. No NSAID agents; limit tylenol to <2000 mg a day.  -     Hepatitis B core antibody, IgM; Future; Expected date: 01/24/2019  -     Hepatitis B surface antibody; Future; Expected date: 01/24/2019  -     Hepatitis B surface antigen; Future; Expected date: 01/24/2019  -     Hepatitis C antibody; Future; Expected date: 01/24/2019  -     Comprehensive metabolic panel; Future; Expected date: 01/24/2019  -     Transferrin; Future; Expected date: 01/24/2019  -     Iron and TIBC; Future; Expected date: 01/24/2019  -     Ferritin; Future; Expected date: 01/24/2019  -     Reticulocytes; Future; Expected date: 01/24/2019  -     US Abdomen Complete; Future; Expected date: 01/24/2019  -     Gamma GT; Future; Expected date: 01/24/2019    Moderate malnutrition; Ensure w protein 1 can a day. Wean off alcohol.   -     Comprehensive metabolic panel; Future; Expected date: 01/24/2019  -     Prealbumin; Future; Expected date: 01/24/2019    Anemia, unspecified type; as above; check iFOBT as well.  -     CBC auto differential; Future; Expected date: 01/24/2019    Thrombocytopenia  -     CBC auto differential; Future; Expected date: 01/24/2019    Overweight (BMI 25.0-29.9)potassium.Caloric restriction w regular exercise and weight reduction.    Kidney stones; keep well hydrated.    LE edema; Maintain less than 2 g sodium diet; elevate lower extremities at home; use compression stockings if possible.

## 2019-02-15 NOTE — TELEPHONE ENCOUNTER
Pt Of Douglas Lambert MD    Last seen on: 1-    Next appt: 5-1-19    Allergies:   Review of patient's allergies indicates:   Allergen Reactions    Amlodipine      Intolerant secondary to dizziness    Dyazide [triamterene-hydrochlorothiazid]      Significant weakness with his diuretic.       Pharmacy:   [facility] - [place], [place] - [address] AT [address] [place] [address]  [facility] [place] [address]  Phone: [phone] Fax: [phone]    Labs: Please review.      Please review! Thank you!

## 2019-02-16 RX ORDER — PANTOPRAZOLE SODIUM 40 MG/1
40 TABLET, DELAYED RELEASE ORAL DAILY
Qty: 90 TABLET | Refills: 1 | Status: SHIPPED | OUTPATIENT
Start: 2019-02-16 | End: 2019-08-07 | Stop reason: SDUPTHER

## 2019-02-16 RX ORDER — METOPROLOL SUCCINATE 50 MG/1
50 TABLET, EXTENDED RELEASE ORAL DAILY
Qty: 90 TABLET | Refills: 1 | Status: SHIPPED | OUTPATIENT
Start: 2019-02-16 | End: 2019-09-04 | Stop reason: SDUPTHER

## 2019-02-28 RX ORDER — GUANFACINE 1 MG/1
TABLET ORAL
Qty: 90 TABLET | Refills: 1 | Status: SHIPPED | OUTPATIENT
Start: 2019-02-28 | End: 2019-08-16 | Stop reason: SDUPTHER

## 2019-03-10 ENCOUNTER — PATIENT MESSAGE (OUTPATIENT)
Dept: INTERNAL MEDICINE | Facility: CLINIC | Age: 84
End: 2019-03-10

## 2019-03-19 ENCOUNTER — OFFICE VISIT (OUTPATIENT)
Dept: INTERNAL MEDICINE | Facility: CLINIC | Age: 84
End: 2019-03-19
Payer: MEDICARE

## 2019-03-19 VITALS
SYSTOLIC BLOOD PRESSURE: 92 MMHG | BODY MASS INDEX: 26.99 KG/M2 | HEART RATE: 60 BPM | DIASTOLIC BLOOD PRESSURE: 62 MMHG | WEIGHT: 204.63 LBS

## 2019-03-19 DIAGNOSIS — R94.5 ABNORMAL RESULTS OF LIVER FUNCTION STUDIES: ICD-10-CM

## 2019-03-19 DIAGNOSIS — I10 ESSENTIAL HYPERTENSION: Primary | ICD-10-CM

## 2019-03-19 DIAGNOSIS — I48.0 PAROXYSMAL ATRIAL FIBRILLATION: ICD-10-CM

## 2019-03-19 DIAGNOSIS — E55.9 VITAMIN D DEFICIENCY: ICD-10-CM

## 2019-03-19 PROCEDURE — 99213 OFFICE O/P EST LOW 20 MIN: CPT | Mod: PBBFAC | Performed by: INTERNAL MEDICINE

## 2019-03-19 PROCEDURE — 99214 OFFICE O/P EST MOD 30 MIN: CPT | Mod: S$PBB,,, | Performed by: INTERNAL MEDICINE

## 2019-03-19 PROCEDURE — 99999 PR PBB SHADOW E&M-EST. PATIENT-LVL III: ICD-10-PCS | Mod: PBBFAC,,, | Performed by: INTERNAL MEDICINE

## 2019-03-19 PROCEDURE — 99999 PR PBB SHADOW E&M-EST. PATIENT-LVL III: CPT | Mod: PBBFAC,,, | Performed by: INTERNAL MEDICINE

## 2019-03-19 PROCEDURE — 99214 PR OFFICE/OUTPT VISIT, EST, LEVL IV, 30-39 MIN: ICD-10-PCS | Mod: S$PBB,,, | Performed by: INTERNAL MEDICINE

## 2019-03-19 NOTE — PROGRESS NOTES
"Subjective:       Patient ID: Roby Rodriguez is a 88 y.o. male.    Chief Complaint: Establish Care    Go Rodriguez is a very pleasant gentleman from [place] here to establish care. Overall doing well and had no worrisome complaints. We discussed his medical history including the fact that he experienced a stroke in the past that, fortunately, did not cause significant difficulties for him. He was on coumadin at the time which had to be held for a surgical procedure he had. He apparently had the stroke during this timeframe. He was switched to Xarelto which he is tolerating well. We also discussed his recent blood work that showed decreased albumin levels as well as mild increase in LFT's. His physician discussed the possibility of alcohol use as a cause of this. Mr Rodriguez and his daughter denied that he drank in excess and only occasionally enjoyed a glass of red wine with his meals. I briefly discussed these results and the possible contributing factors. He has been advised to drink Ensure daily, but he does not like this as it interferes with his appetite and eats less of the foods prepared for him. I encouraged him to try to consume more ans see how he does. He also reports being "cold all the time". I discussed how the body's ability to regulate temperature may be affected in some individuals as well as the use of certain medications. Briefly discussed his blood work from the recent past that did not show issues with his thyroid function. I will obtain vitamin D levels and see if this might be playing a role. He reports frequent nasal drip, has used nasal spays in the past w/o much improvement. I recommended a trial of OTC Claritin for a few days and see if it helps. I will consider a formal referral to Allergy dept if this issue persists.  Review of Systems   All other systems reviewed and are negative.      Objective:      Physical Exam   Constitutional: He is oriented to person, place, and time. He " appears well-developed and well-nourished. No distress.   HENT:   Head: Normocephalic and atraumatic.   Right Ear: External ear normal.   Left Ear: External ear normal.   Mouth/Throat: Oropharynx is clear and moist. No oropharyngeal exudate.   Neck: Normal range of motion. Neck supple. No JVD present. No thyromegaly present.   Cardiovascular: Normal rate, regular rhythm, normal heart sounds and intact distal pulses.   No murmur heard.  Pulmonary/Chest: Effort normal and breath sounds normal. No respiratory distress. He has no wheezes.   Abdominal: Soft. Bowel sounds are normal. He exhibits no distension. There is no tenderness.   Musculoskeletal: He exhibits no edema.   Lymphadenopathy:     He has no cervical adenopathy.   Neurological: He is alert and oriented to person, place, and time. No cranial nerve deficit.   Skin: Skin is warm and dry. He is not diaphoretic.   Psychiatric: He has a normal mood and affect. His behavior is normal.   Nursing note and vitals reviewed.      Assessment:       1. Essential hypertension    2. Vitamin D deficiency    3. Paroxysmal atrial fibrillation     4.     Out-of-range blood work as described above.  Plan:    1. Obtain blood work.         2. Continue with current medications.         3. Monitor BP; keep diary.         4. RTC for review.

## 2019-03-21 ENCOUNTER — LAB VISIT (OUTPATIENT)
Dept: LAB | Facility: HOSPITAL | Age: 84
End: 2019-03-21
Attending: INTERNAL MEDICINE
Payer: MEDICARE

## 2019-03-21 DIAGNOSIS — E55.9 VITAMIN D DEFICIENCY: ICD-10-CM

## 2019-03-21 LAB — 25(OH)D3+25(OH)D2 SERPL-MCNC: 31 NG/ML

## 2019-03-21 PROCEDURE — 82306 VITAMIN D 25 HYDROXY: CPT

## 2019-03-21 PROCEDURE — 36415 COLL VENOUS BLD VENIPUNCTURE: CPT | Mod: PO

## 2019-04-15 ENCOUNTER — CLINICAL SUPPORT (OUTPATIENT)
Dept: CARDIOLOGY | Facility: CLINIC | Age: 84
End: 2019-04-15
Attending: INTERNAL MEDICINE
Payer: MEDICARE

## 2019-04-15 DIAGNOSIS — Z95.0 PACEMAKER: ICD-10-CM

## 2019-04-15 DIAGNOSIS — I47.10 SVT (SUPRAVENTRICULAR TACHYCARDIA): ICD-10-CM

## 2019-04-20 RX ORDER — ATORVASTATIN CALCIUM 40 MG/1
TABLET, FILM COATED ORAL
Qty: 90 TABLET | Refills: 3 | OUTPATIENT
Start: 2019-04-20

## 2019-04-22 ENCOUNTER — NURSE TRIAGE (OUTPATIENT)
Dept: ADMINISTRATIVE | Facility: CLINIC | Age: 84
End: 2019-04-22

## 2019-04-22 NOTE — TELEPHONE ENCOUNTER
Reason for Disposition   Patient sounds very sick or weak to the triager    Protocols used: ABDOMINAL PAIN - MALE-A-AH  Daughter states pt having U/S wed. Pt c/o stomach hurting now doesnt feel like eating. Cant pass BM. Feels stool in rectum. No flatus, no nausea. Upper abd pain started about 2 hours ago comes and goes. ?? last BM- took a laxative this am and passed small BM. Rates pain 2-3. Pain around belly button. Pt on the line states he has been Eating a lot lately. Spitting up a lot of phlegm, mouth dry. Vx2(water). Pt Belching.  Daughter states pt now in restroom able to pass BM, no pain anymore. No nausea. Denies abd pain. Daughter says now pt again c/o abd pain. Rec ED due to abd pain, N/V, no flatus. Call back with questions

## 2019-04-23 ENCOUNTER — TELEPHONE (OUTPATIENT)
Dept: INTERNAL MEDICINE | Facility: CLINIC | Age: 84
End: 2019-04-23

## 2019-04-24 ENCOUNTER — TELEPHONE (OUTPATIENT)
Dept: INTERNAL MEDICINE | Facility: CLINIC | Age: 84
End: 2019-04-24

## 2019-04-24 DIAGNOSIS — R79.89 ELEVATED LIVER FUNCTION TESTS: Primary | ICD-10-CM

## 2019-04-26 RX ORDER — ATORVASTATIN CALCIUM 40 MG/1
40 TABLET, FILM COATED ORAL DAILY
Qty: 90 TABLET | Refills: 3 | Status: SHIPPED | OUTPATIENT
Start: 2019-04-26 | End: 2020-01-01 | Stop reason: SDUPTHER

## 2019-04-29 DIAGNOSIS — Z95.0 PACEMAKER: Primary | ICD-10-CM

## 2019-04-29 DIAGNOSIS — I47.10 SVT (SUPRAVENTRICULAR TACHYCARDIA): ICD-10-CM

## 2019-05-28 ENCOUNTER — OFFICE VISIT (OUTPATIENT)
Dept: DERMATOLOGY | Facility: CLINIC | Age: 84
End: 2019-05-28
Payer: MEDICARE

## 2019-05-28 ENCOUNTER — OFFICE VISIT (OUTPATIENT)
Dept: HEPATOLOGY | Facility: CLINIC | Age: 84
End: 2019-05-28
Payer: MEDICARE

## 2019-05-28 VITALS — HEIGHT: 73 IN | BODY MASS INDEX: 27.17 KG/M2 | WEIGHT: 205 LBS

## 2019-05-28 VITALS
OXYGEN SATURATION: 96 % | SYSTOLIC BLOOD PRESSURE: 129 MMHG | HEART RATE: 83 BPM | DIASTOLIC BLOOD PRESSURE: 66 MMHG | WEIGHT: 209.44 LBS | HEIGHT: 72 IN | BODY MASS INDEX: 28.37 KG/M2

## 2019-05-28 DIAGNOSIS — Z85.828 HISTORY OF NONMELANOMA SKIN CANCER: ICD-10-CM

## 2019-05-28 DIAGNOSIS — L57.0 ACTINIC KERATOSES: Primary | ICD-10-CM

## 2019-05-28 DIAGNOSIS — L72.0 MILIA: ICD-10-CM

## 2019-05-28 DIAGNOSIS — K73.9 CHRONIC HEPATITIS: ICD-10-CM

## 2019-05-28 DIAGNOSIS — L81.4 SOLAR LENTIGO: ICD-10-CM

## 2019-05-28 DIAGNOSIS — K76.0 FATTY (CHANGE OF) LIVER, NOT ELSEWHERE CLASSIFIED: ICD-10-CM

## 2019-05-28 DIAGNOSIS — R79.89 ELEVATED LIVER FUNCTION TESTS: Primary | ICD-10-CM

## 2019-05-28 DIAGNOSIS — L82.1 SEBORRHEIC KERATOSES: ICD-10-CM

## 2019-05-28 PROCEDURE — 99999 PR PBB SHADOW E&M-EST. PATIENT-LVL II: CPT | Mod: PBBFAC,,, | Performed by: DERMATOLOGY

## 2019-05-28 PROCEDURE — 99213 OFFICE O/P EST LOW 20 MIN: CPT | Mod: 25,S$PBB,, | Performed by: DERMATOLOGY

## 2019-05-28 PROCEDURE — 17000 DESTRUCT PREMALG LESION: CPT | Mod: PBBFAC,PO | Performed by: DERMATOLOGY

## 2019-05-28 PROCEDURE — 99999 PR PBB SHADOW E&M-EST. PATIENT-LVL IV: ICD-10-PCS | Mod: PBBFAC,,, | Performed by: INTERNAL MEDICINE

## 2019-05-28 PROCEDURE — 17003 DESTRUCTION, PREMALIGNANT LESIONS; SECOND THROUGH 14 LESIONS: ICD-10-PCS | Mod: S$PBB,,, | Performed by: DERMATOLOGY

## 2019-05-28 PROCEDURE — 99204 PR OFFICE/OUTPT VISIT, NEW, LEVL IV, 45-59 MIN: ICD-10-PCS | Mod: S$PBB,,, | Performed by: INTERNAL MEDICINE

## 2019-05-28 PROCEDURE — 99212 OFFICE O/P EST SF 10 MIN: CPT | Mod: PBBFAC,PO,25 | Performed by: DERMATOLOGY

## 2019-05-28 PROCEDURE — 99204 OFFICE O/P NEW MOD 45 MIN: CPT | Mod: S$PBB,,, | Performed by: INTERNAL MEDICINE

## 2019-05-28 PROCEDURE — 99999 PR PBB SHADOW E&M-EST. PATIENT-LVL IV: CPT | Mod: PBBFAC,,, | Performed by: INTERNAL MEDICINE

## 2019-05-28 PROCEDURE — 99999 PR PBB SHADOW E&M-EST. PATIENT-LVL II: ICD-10-PCS | Mod: PBBFAC,,, | Performed by: DERMATOLOGY

## 2019-05-28 PROCEDURE — 17003 DESTRUCT PREMALG LES 2-14: CPT | Mod: S$PBB,,, | Performed by: DERMATOLOGY

## 2019-05-28 PROCEDURE — 99213 PR OFFICE/OUTPT VISIT, EST, LEVL III, 20-29 MIN: ICD-10-PCS | Mod: 25,S$PBB,, | Performed by: DERMATOLOGY

## 2019-05-28 PROCEDURE — 99214 OFFICE O/P EST MOD 30 MIN: CPT | Mod: PBBFAC,25,27 | Performed by: INTERNAL MEDICINE

## 2019-05-28 PROCEDURE — 17003 DESTRUCT PREMALG LES 2-14: CPT | Mod: PBBFAC,PO | Performed by: DERMATOLOGY

## 2019-05-28 PROCEDURE — 17000 DESTRUCT PREMALG LESION: CPT | Mod: S$PBB,,, | Performed by: DERMATOLOGY

## 2019-05-28 PROCEDURE — 17000 PR DESTRUCTION(LASER SURGERY,CRYOSURGERY,CHEMOSURGERY),PREMALIGNANT LESIONS,FIRST LESION: ICD-10-PCS | Mod: S$PBB,,, | Performed by: DERMATOLOGY

## 2019-05-28 NOTE — Clinical Note
I accidentally put RTC for 6-8 months instead of weeks yesterday.  Please make sure patient has recall for return in 6-8 weeks to discuss liver biopsy results.  Sorry for the confusion.Thanks,Savanna

## 2019-05-28 NOTE — PROGRESS NOTES
Hepatology Consult Note    Referring provider: Dr. Wayne Silverman    Chief complaint:   Chief Complaint   Patient presents with    elevated liver function test       HPI:  Roby Rodriguez is a 88 y.o. male that presents to hepatology clinic for consultation of elevated liver tests.  He is accompanied by his daughter.      The patient has no known history of chronic liver disease.  He denies any symptoms such as jaundice or ascites.  He notes intermittent swelling of bilateral ankles and hands which has been occurring for the last 6-8 months.   The patient is noted to have elevated liver tests since 2014.  This includes intermittent elevation of bilirubin along with persistent elevation of AP and AST.  Recent ultrasound from 4/2019 shows concern for hepatocellular disease with coarsened echotexture and increased echogenicity with suggestion of fatty liver.  Patient with risk factors for VERMA including DM, BMI 28, and hypertension.  The patient has also had a prior CVA and is on anticoagulation with Xarelto.    No obvious risk factors for liver disease outside of VERMA.  Denies significant alcohol intake.      Patient has pacemaker for history of tachyarrhythmia.       Patient Active Problem List   Diagnosis    BPH (benign prostatic hypertrophy)    Kidney stones    Vertigo    Premature ventricular contractions (PVCs) (VPCs)    SVT (supraventricular tachycardia)    Premature atrial complexes    Paroxysmal atrial fibrillation    Bradycardia    Tachy-kunal syndrome    Pacemaker    Hearing loss, sensorineural    Long term current use of anticoagulant therapy    Essential hypertension    Mastoiditis of right side    Completed stroke    PUD (peptic ulcer disease)    Muscle weakness    Left arm pain       Past Medical History:   Diagnosis Date    *Atrial fibrillation     Allergy     Arthritis     Atrial fibrillation     Basal cell carcinoma 10/12/10    left earlobe    Basal cell carcinoma  7/23/2014    right neck    Cancer     Hx of Squamous Cell CA Scalp    Conductive hearing loss, bilateral     Chronic Eustachian Tube Dysfunction    Frequency of urination     Gastroenteritis     Headache(784.0)     Hypertension     Kidney stones     Otitis media     Squamous Cell Carcinoma 7/27/07    SCC ant scalp    Supraventricular tachycardia    CVA 3-4 years ago - reports mild L sided weakness       Past Surgical History:   Procedure Laterality Date    ADENOIDECTOMY      Cardiac pacemaker implantation      COLONOSCOPY  2004    reportedly normal in 2004    Duputyren Contracture Right Hand      INSERT / REPLACE / REMOVE PACEMAKER      Resection of Skin Cancer Scalp      TONSILLECTOMY      UPPER GASTROINTESTINAL ENDOSCOPY  07/31/2017    [place], [place]; sent for scanning: small hiatal hernia, diffuse erythema scattered all throughout the stomach, not actively bleeding, but suggestive of possible hemorrhagic gastritis, mild bile reflux noted; no erosions or ulcers seen; no biopsy report received.       Family History   Problem Relation Age of Onset    Melanoma Neg Hx     Psoriasis Neg Hx     Lupus Neg Hx     Eczema Neg Hx     Colon cancer Neg Hx     Colon polyps Neg Hx     Crohn's disease Neg Hx     Ulcerative colitis Neg Hx     Stomach cancer Neg Hx     Esophageal cancer Neg Hx        Social History     Socioeconomic History    Marital status:      Spouse name: Not on file    Number of children: Not on file    Years of education: Not on file    Highest education level: Not on file   Occupational History    Occupation: Retired   Social Needs    Financial resource strain: Not on file    Food insecurity:     Worry: Not on file     Inability: Not on file    Transportation needs:     Medical: Not on file     Non-medical: Not on file   Tobacco Use    Smoking status: Never Smoker    Smokeless tobacco: Never Used   Substance and Sexual Activity    Alcohol use: Yes     Alcohol/week:  4.2 oz     Types: 7 Glasses of wine per week    Drug use: No    Sexual activity: Not on file   Lifestyle    Physical activity:     Days per week: Not on file     Minutes per session: Not on file    Stress: Not on file   Relationships    Social connections:     Talks on phone: Not on file     Gets together: Not on file     Attends Baptism service: Not on file     Active member of club or organization: Not on file     Attends meetings of clubs or organizations: Not on file     Relationship status: Not on file   Other Topics Concern    Not on file   Social History Narrative    Not on file       Current Outpatient Medications   Medication Sig Dispense Refill    antipyrine-benzocaine (AURALGAN OR EQUIV) 5.4-1.4 % Drop Place 3 drops into both ears 4 (four) times daily. 1 Bottle 3    atorvastatin (LIPITOR) 40 MG tablet Take 1 tablet (40 mg total) by mouth once daily. 90 tablet 3    C,E,zinc,copper 11/vrtha4v/lut (OCUVITE ADULT 50 PLUS ORAL) Take by mouth once daily.      guanFACINE (TENEX) 1 MG Tab TAKE 1 TABLET EVERY EVENING 90 tablet 1    guanFACINE (TENEX) 1 MG Tab TAKE 1 TABLET EVERY EVENING 90 tablet 1    lisinopril (PRINIVIL,ZESTRIL) 20 MG tablet Take 1 tablet (20 mg total) by mouth 2 (two) times daily. 180 tablet 3    loratadine-pseudoephedrine  mg (CLARITIN-D 24-HOUR)  mg per 24 hr tablet Take 1 tablet by mouth once daily.      magnesium oxide (MAG-OX) 400 mg (241.3 mg magnesium) tablet Take 1 tablet (400 mg total) by mouth once daily. 30 tablet 2    metoprolol succinate (TOPROL-XL) 50 MG 24 hr tablet Take 1 tablet (50 mg total) by mouth once daily. 90 tablet 1    pantoprazole (PROTONIX) 40 MG tablet Take 1 tablet (40 mg total) by mouth once daily. 90 tablet 1    rivaroxaban (XARELTO) 20 mg Tab Take 1 tablet (20 mg total) by mouth once daily. 90 tablet 3    vitamin D 1000 units Tab Take 1,000 Units by mouth once daily.       No current facility-administered medications for this  visit.        Review of patient's allergies indicates:   Allergen Reactions    Amlodipine      Intolerant secondary to dizziness    Dyazide [triamterene-hydrochlorothiazid]      Significant weakness with his diuretic.       Review of Systems   Constitutional: Negative for chills, fever, malaise/fatigue and weight loss.   HENT: Positive for hearing loss.    Eyes: Negative.    Respiratory: Negative for cough and shortness of breath.    Cardiovascular: Negative for chest pain and leg swelling.   Gastrointestinal: Negative for abdominal pain, heartburn, nausea and vomiting.   Musculoskeletal: Negative for joint pain and myalgias.        Bilateral ankle swelling   Skin: Negative for itching and rash.   Neurological: Positive for focal weakness. Negative for dizziness and headaches.   Endo/Heme/Allergies: Does not bruise/bleed easily.   Psychiatric/Behavioral: Negative for depression.       Vitals:    05/28/19 1506   BP: 129/66   Pulse: 83   SpO2: 96%   Weight: 95 kg (209 lb 7 oz)   Height: 6' (1.829 m)       Physical Exam   Constitutional: He is oriented to person, place, and time. He appears well-developed and well-nourished.   HENT:   Head: Normocephalic and atraumatic.   Mouth/Throat: Oropharynx is clear and moist. No oropharyngeal exudate.   Hearing aids bilaterally   Eyes: Pupils are equal, round, and reactive to light. Conjunctivae are normal. No scleral icterus.   Neck: Normal range of motion. Neck supple. No thyromegaly present.   Cardiovascular: Normal rate, regular rhythm and normal heart sounds. Exam reveals no gallop and no friction rub.   No murmur heard.  Pulmonary/Chest: Effort normal and breath sounds normal. No respiratory distress. He has no wheezes. He has no rales.   Abdominal: Soft. Bowel sounds are normal. He exhibits no distension. There is no tenderness.   Musculoskeletal: Normal range of motion. He exhibits no edema.   Lymphadenopathy:     He has no cervical adenopathy.   Neurological: He is  alert and oriented to person, place, and time. No cranial nerve deficit.   Mental status normal   Skin: Skin is warm and dry. No erythema.   Psychiatric: He has a normal mood and affect. His behavior is normal.   Vitals reviewed.      Lab Results   Component Value Date    ALT 31 01/17/2019    AST 42 (H) 01/17/2019     (H) 01/17/2019    ALKPHOS 137 (H) 01/17/2019    BILITOT 1.0 01/17/2019       Lab Results   Component Value Date    WBC 6.35 01/17/2019    HGB 12.9 (L) 01/17/2019    HCT 39.5 (L) 01/17/2019     (H) 01/17/2019     (L) 01/17/2019       Lab Results   Component Value Date     01/17/2019    K 3.9 01/17/2019     01/17/2019    CO2 23 01/17/2019    BUN 20 01/17/2019    CREATININE 0.9 01/17/2019    CALCIUM 9.1 01/17/2019    ANIONGAP 6 (L) 01/17/2019    ESTGFRAFRICA >60.0 01/17/2019    EGFRNONAA >60.0 01/17/2019       Imaging: EMR and external imaging available reviewed     Assessment:  88 y.o. male presenting with elevated liver tests of unclear etiology with concern for advanced fibrosis based on persistent elevation of liver tests and coarsened echotexture on imaging.  The patient has no obvious signs of portal hypertension clinically, biochemically or by imaging.      Plan:  Elevated liver tests:  Serologic work-up performed.  Noted to have low A1AT level and phenotype ordered.  Patient also with risk factors for VERMA.  Discussed with patient daughter that he is not eligible for fibroscan or MR elastography for fibrosis staging due to pacemaker.  Therefore we will start with serologic work-up and determine if etiology of elevated liver tests can be elucidated.  If not, liver biopsy may be needed for diagnostic purposes along with fibrosis staging.  If etiology of abnormal liver tests can be determined, then consideration of US elastography with radiology could be considered as this can be performed with implantable device.  Patient does understand there is slight increased  risk of procedure with his history of stroke and use of blood thinners.  Discussed with Dr. Silverman and will ensure no contraindication with cardiology for anticoagulation to be held if liver biopsy is needed.  May require lovenox bridge based on history of stroke.    Patient and daughter are agreeable to liver biopsy if indicated.     RTC in 6-8 weeks

## 2019-05-28 NOTE — PROGRESS NOTES
Subjective:       Patient ID:  Roby Rodriguez is a 88 y.o. male who presents for   Chief Complaint   Patient presents with    Spot     left forehead x 5-6 months,scabs, picks at it, no tx    Skin Check     only wants head checked     Patient last seen 12/2018   H/o AKs treated with cryo  H/o  SCC left zygoma, s/p Mohs Dr Mcintyre 03/2017  Also SCCIS left helix, neg bx margins, monitoring  Recent care by Dr Eliel eubanks 2, ordered PDT, completed jan 2018, face and arms  Biopsied lesion on left forehead 04/2018, AK/SCCIS treated with efudex  H/o multiple prior NMSCs    This is a high risk patient here to check for the development of new lesions.  + pacemaker  New complaint      Spot  - Initial  Affected locations: forehead  Duration: 6 months  Signs / symptoms: bleeding and scabs  Severity: mild  Timing: constant  Aggravated by: picking  Relieving factors/Treatments tried: nothing        Review of Systems   Constitutional: Negative for fever, chills and fatigue.   Skin: Positive for wears hat. Negative for daily sunscreen use and activity-related sunscreen use.   Hematologic/Lymphatic: Bruises/bleeds easily.        Objective:    Physical Exam   Constitutional: He appears well-developed and well-nourished.   Neurological: He is alert.   Skin:   Areas Examined (abnormalities noted in diagram):   Scalp / Hair Palpated and Inspected  Head / Face Inspection Performed  Neck Inspection Performed  Chest / Axilla Inspection Performed  Abdomen Inspection Performed  Back Inspection Performed  RUE Inspected  LUE Inspection Performed                       Diagram Legend     Erythematous scaling macule/papule c/w actinic keratosis       Vascular papule c/w angioma      Pigmented verrucoid papule/plaque c/w seborrheic keratosis      Yellow umbilicated papule c/w sebaceous hyperplasia      Irregularly shaped tan macule c/w lentigo     1-2 mm smooth white papules consistent with Milia      Movable subcutaneous cyst with punctum  c/w epidermal inclusion cyst      Subcutaneous movable cyst c/w pilar cyst      Firm pink to brown papule c/w dermatofibroma      Pedunculated fleshy papule(s) c/w skin tag(s)      Evenly pigmented macule c/w junctional nevus     Mildly variegated pigmented, slightly irregular-bordered macule c/w mildly atypical nevus      Flesh colored to evenly pigmented papule c/w intradermal nevus       Pink pearly papule/plaque c/w basal cell carcinoma      Erythematous hyperkeratotic cursted plaque c/w SCC      Surgical scar with no sign of skin cancer recurrence      Open and closed comedones      Inflammatory papules and pustules      Verrucoid papule consistent consistent with wart     Erythematous eczematous patches and plaques     Dystrophic onycholytic nail with subungual debris c/w onychomycosis     Umbilicated papule    Erythematous-base heme-crusted tan verrucoid plaque consistent with inflamed seborrheic keratosis     Erythematous Silvery Scaling Plaque c/w Psoriasis     See annotation      Assessment / Plan:        Actinic keratoses  Cryosurgery Procedure Note    Verbal consent from the patient is obtained and the patient is aware of the precancerous quality and need for treatment of these lesions. Liquid nitrogen cryosurgery is applied to the 10 actinic keratoses, as detailed in the physical exam, to produce a freeze injury. The patient is aware that blisters may form and is instructed on wound care with gentle cleansing and use of vaseline ointment to keep moist until healed. The patient is supplied a handout on cryosurgery and is instructed to call if lesions do not completely resolve.    Seborrheic keratoses  These are benign inherited growths without a malignant potential. Reassurance given to patient. No treatment is necessary.     History of nonmelanoma skin cancer  Area of previous NMSC (mutliple) examined. Site well healed with no signs of recurrence.  Upper body skin examination performed today including  at least 9 points as noted in physical examination. No lesions suspicious for malignancy noted.    Solar lentigo  This is a benign hyperpigmented sun induced lesion. Daily sun protection will reduce the number of new lesions. Treatment of these benign lesions are considered cosmetic.    Milia   Ears, reassurance    Patient instructed in importance in daily sun protection of at least spf 30. Mineral sunscreen ingredients preferred (Zinc +/- Titanium).   Recommend Elta MD for daily use on face and neck.  Patient encouraged to wear hat for all outdoor exposure.   Also discussed sun avoidance and use of protective clothing.           Follow up in about 6 months (around 11/28/2019).

## 2019-05-28 NOTE — PATIENT INSTRUCTIONS
You can take miralax over the counter for constipation.  You may you this as needed.      Your liver tests are abnormal.  Labs today     We will proceed with transjugular liver biopsy.  You will need to Xarelto for the procedure.    I will contact Dr. Velazquez to determine if you need lovenox in preparation for the procedure when the blood thinner is held.     No changes in medication at this time.    Return to clinic in 6-8 weeks

## 2019-05-28 NOTE — LETTER
May 30, 2019      Wayne Silverman MD  [address]  [facility] [address]           [facility] - Hepatology  [address]  [facility] [address]  Phone: [phone]  Fax: [phone]          Patient: Roby Rodriguez   MR Number: [id number]   YOB: 1930   Date of Visit: 5/28/2019       Dear Dr. Wayne Silverman:    Thank you for referring Roby Rodriguez to me for evaluation. Attached you will find relevant portions of my assessment and plan of care.    If you have questions, please do not hesitate to call me. I look forward to following Rboy Rodriguez along with you.    Sincerely,    Savanna Blanc MD    Enclosure  CC:  No Recipients    If you would like to receive this communication electronically, please contact [email or web address] or [phone] to request more information on [facility] [employer] access.    For providers and/or their staff who would like to refer a patient to [facility], please contact us through our one-stop-shop provider referral line, [facility], at [phone].    If you feel you have received this communication in error or would no longer like to receive these types of communications, please e-mail [email or web address]

## 2019-05-30 ENCOUNTER — TELEPHONE (OUTPATIENT)
Dept: HEPATOLOGY | Facility: CLINIC | Age: 84
End: 2019-05-30

## 2019-05-30 DIAGNOSIS — R79.89 ELEVATED LIVER FUNCTION TESTS: Primary | ICD-10-CM

## 2019-05-30 NOTE — TELEPHONE ENCOUNTER
----- Message from Savanna Blanc MD sent at 5/30/2019  7:11 AM CDT -----  Please contact lab and add on alpha-1 antitrypsin phenotype if possible.  Screening test is abnormal  Order has been placed  
Patient got labs drawn on Tuesday and the Alppha 1 antitrypsin was in those labs.   
(4) no impairment

## 2019-05-31 ENCOUNTER — TELEPHONE (OUTPATIENT)
Dept: TRANSPLANT | Facility: CLINIC | Age: 84
End: 2019-05-31

## 2019-05-31 NOTE — TELEPHONE ENCOUNTER
----- Message from Vincenzo Velazquez MD sent at 5/30/2019  2:06 PM CDT -----  Yoni Corrales,  I would endorse holding Xarelto without bridging.  Thanks  Vincenzo  ----- Message -----  From: Savanna Blanc MD  Sent: 5/30/2019  12:52 PM  To: Vincenzo Velazquez MD    Good afternoon Dr. Velazquez,  I am the hepatologist that recently saw Mr. Ron for work-up of elevated liver tests.  There is a possibility he would need liver biopsy for diagnostic purposes and he is willing to do the procedure.  If he proceeds would you recommend lovenox bridge when holding Xarelto for procedure given his prior CVA.  Just trying to figure out the logistics in case we move forward.    ThanksSavanna

## 2019-06-05 ENCOUNTER — CLINICAL SUPPORT (OUTPATIENT)
Dept: CARDIOLOGY | Facility: CLINIC | Age: 84
End: 2019-06-05
Attending: INTERNAL MEDICINE
Payer: MEDICARE

## 2019-06-05 DIAGNOSIS — Z95.0 PACEMAKER: ICD-10-CM

## 2019-06-05 DIAGNOSIS — I47.10 SVT (SUPRAVENTRICULAR TACHYCARDIA): ICD-10-CM

## 2019-06-13 DIAGNOSIS — I63.9 CEREBROVASCULAR ACCIDENT (CVA), UNSPECIFIED MECHANISM: ICD-10-CM

## 2019-06-13 DIAGNOSIS — E78.2 MIXED HYPERLIPIDEMIA: ICD-10-CM

## 2019-06-13 DIAGNOSIS — I10 ESSENTIAL HYPERTENSION: ICD-10-CM

## 2019-06-14 RX ORDER — LISINOPRIL 20 MG/1
TABLET ORAL
Qty: 180 TABLET | Refills: 1 | Status: SHIPPED | OUTPATIENT
Start: 2019-06-14 | End: 2019-06-17 | Stop reason: SDUPTHER

## 2019-06-17 DIAGNOSIS — I63.9 CEREBROVASCULAR ACCIDENT (CVA), UNSPECIFIED MECHANISM: ICD-10-CM

## 2019-06-17 DIAGNOSIS — I10 ESSENTIAL HYPERTENSION: ICD-10-CM

## 2019-06-17 DIAGNOSIS — E78.2 MIXED HYPERLIPIDEMIA: ICD-10-CM

## 2019-06-17 RX ORDER — LISINOPRIL 20 MG/1
20 TABLET ORAL 2 TIMES DAILY
Qty: 180 TABLET | Refills: 1 | Status: SHIPPED | OUTPATIENT
Start: 2019-06-17 | End: 2019-01-01

## 2019-06-28 ENCOUNTER — OFFICE VISIT (OUTPATIENT)
Dept: INTERNAL MEDICINE | Facility: CLINIC | Age: 84
End: 2019-06-28
Payer: MEDICARE

## 2019-06-28 VITALS
DIASTOLIC BLOOD PRESSURE: 84 MMHG | BODY MASS INDEX: 28.7 KG/M2 | SYSTOLIC BLOOD PRESSURE: 128 MMHG | WEIGHT: 211.63 LBS | HEART RATE: 62 BPM

## 2019-06-28 DIAGNOSIS — R79.89 ABNORMAL LFTS (LIVER FUNCTION TESTS): Primary | ICD-10-CM

## 2019-06-28 DIAGNOSIS — E88.09 HYPOALBUMINEMIA: ICD-10-CM

## 2019-06-28 DIAGNOSIS — R60.9 EDEMA, UNSPECIFIED TYPE: ICD-10-CM

## 2019-06-28 PROCEDURE — 99999 PR PBB SHADOW E&M-EST. PATIENT-LVL II: CPT | Mod: PBBFAC,,, | Performed by: INTERNAL MEDICINE

## 2019-06-28 PROCEDURE — 99213 PR OFFICE/OUTPT VISIT, EST, LEVL III, 20-29 MIN: ICD-10-PCS | Mod: S$PBB,,, | Performed by: INTERNAL MEDICINE

## 2019-06-28 PROCEDURE — 99212 OFFICE O/P EST SF 10 MIN: CPT | Mod: PBBFAC | Performed by: INTERNAL MEDICINE

## 2019-06-28 PROCEDURE — 99999 PR PBB SHADOW E&M-EST. PATIENT-LVL II: ICD-10-PCS | Mod: PBBFAC,,, | Performed by: INTERNAL MEDICINE

## 2019-06-28 PROCEDURE — 99213 OFFICE O/P EST LOW 20 MIN: CPT | Mod: S$PBB,,, | Performed by: INTERNAL MEDICINE

## 2019-06-28 NOTE — PROGRESS NOTES
Subjective:       Patient ID: Roby Rodriguez is a 89 y.o. male.    Chief Complaint: Follow-up    HPI Mr Rodriguez is a very pleasant gentleman from [place] here for follow up. Overall doing well, but he reports noting increased edema of his LE as well as his arms L>R. The LE edema is not new, but the arm edema is of relatively recent onset. He denies cough, SOB, FABIAN, PND. He is currently undergoing work up for liver issues and is scheduled to See Dr Blanc in Hepatology next week. He has been recommended to undergo a liver biopsy to determine he nature of his problem, but this has not been done. His family and he are concerned about the procedure given his age and use of anticoags. In looking over his recent blood work, his albumin level has been decreasing and is now at 2. This has gradually been declinig over the last 2 years which is when they started to notice the edema of his legs. I discussed how this fact is likely contributing to his edema. I will discuss this issue with Dr Blanc once she has seen him and proceed from there. Otherwise he is doing well. Cheerfull, conversant and a pleasure to talk to.  Review of Systems   All other systems reviewed and are negative.      Objective:      Physical Exam   Constitutional: He appears well-developed and well-nourished. No distress.   HENT:   Head: Normocephalic and atraumatic.   Right Ear: External ear normal.   Left Ear: External ear normal.   Mouth/Throat: No oropharyngeal exudate.   Neck: Normal range of motion. Neck supple. No JVD present. No thyromegaly present.   Cardiovascular: Normal rate, regular rhythm, normal heart sounds and intact distal pulses.   No murmur heard.  Pulmonary/Chest: Effort normal and breath sounds normal. No respiratory distress. He has no wheezes.   Musculoskeletal: He exhibits edema.   Lymphadenopathy:     He has no cervical adenopathy.   Neurological: He is alert. No cranial nerve deficit.   Skin: Skin is warm and dry. He is not  diaphoretic.   Psychiatric: He has a normal mood and affect. His behavior is normal.   Nursing note and vitals reviewed.      Assessment:       1. Edema, unspecified type     2.     Hypoalbuminemia.   3.     Hepatic issues as above.   Plan:    1. Follow up with Hepatology.         2. RTC 1 month for follow up.

## 2019-07-10 ENCOUNTER — OFFICE VISIT (OUTPATIENT)
Dept: HEPATOLOGY | Facility: CLINIC | Age: 84
End: 2019-07-10
Payer: MEDICARE

## 2019-07-10 VITALS
BODY MASS INDEX: 27.91 KG/M2 | HEIGHT: 73 IN | WEIGHT: 210.56 LBS | HEART RATE: 81 BPM | DIASTOLIC BLOOD PRESSURE: 75 MMHG | OXYGEN SATURATION: 97 % | SYSTOLIC BLOOD PRESSURE: 148 MMHG

## 2019-07-10 DIAGNOSIS — R79.89 ELEVATED LIVER FUNCTION TESTS: Primary | ICD-10-CM

## 2019-07-10 PROCEDURE — 99999 PR PBB SHADOW E&M-EST. PATIENT-LVL IV: CPT | Mod: PBBFAC,,, | Performed by: INTERNAL MEDICINE

## 2019-07-10 PROCEDURE — 99215 PR OFFICE/OUTPT VISIT, EST, LEVL V, 40-54 MIN: ICD-10-PCS | Mod: S$PBB,,, | Performed by: INTERNAL MEDICINE

## 2019-07-10 PROCEDURE — 99215 OFFICE O/P EST HI 40 MIN: CPT | Mod: S$PBB,,, | Performed by: INTERNAL MEDICINE

## 2019-07-10 PROCEDURE — 99999 PR PBB SHADOW E&M-EST. PATIENT-LVL IV: ICD-10-PCS | Mod: PBBFAC,,, | Performed by: INTERNAL MEDICINE

## 2019-07-10 PROCEDURE — 99214 OFFICE O/P EST MOD 30 MIN: CPT | Mod: PBBFAC | Performed by: INTERNAL MEDICINE

## 2019-07-10 RX ORDER — FUROSEMIDE 20 MG/1
20 TABLET ORAL DAILY
Qty: 90 TABLET | Refills: 3 | Status: SHIPPED | OUTPATIENT
Start: 2019-07-10 | End: 2020-01-01 | Stop reason: SDUPTHER

## 2019-07-10 RX ORDER — SPIRONOLACTONE 50 MG/1
50 TABLET, FILM COATED ORAL DAILY
Qty: 90 TABLET | Refills: 3 | Status: SHIPPED | OUTPATIENT
Start: 2019-07-10 | End: 2019-01-01 | Stop reason: SDUPTHER

## 2019-07-10 NOTE — PROGRESS NOTES
Hepatology Follow-up Note    Chief complaint:   Chief Complaint   Patient presents with    elevated liver function test       HPI:  Roby Rodriguez is a 89 y.o. male that presents to hepatology clinic for follow-up of elevated liver tests.  He is accompanied by his daughter.      The patient was last seen in clinic on 5/28/2019.  Referred for evaluation of elevated liver tests are concern for advanced liver disease.      The patient had no clinical history of chronic liver disease and only symptom was mild LE and hand swelling which has been present over the last 8-10 months.  He is noted to have elevated liver tests since 2014.   Risk factors for VERMA present along with prior history of CVA for which he is on anticoagulation with Xarelto.    Serologic work-up only remarkable for low A1AT level.      Currently endorses other symptoms of cold intolerance and easy bruising.       Patient Active Problem List   Diagnosis    BPH (benign prostatic hypertrophy)    Kidney stones    Vertigo    Premature ventricular contractions (PVCs) (VPCs)    SVT (supraventricular tachycardia)    Premature atrial complexes    Paroxysmal atrial fibrillation    Bradycardia    Tachy-kunal syndrome    Pacemaker    Hearing loss, sensorineural    Long term current use of anticoagulant therapy    Essential hypertension    Mastoiditis of right side    Completed stroke    PUD (peptic ulcer disease)    Muscle weakness    Left arm pain       Past Medical History:   Diagnosis Date    *Atrial fibrillation     Allergy     Arthritis     Atrial fibrillation     Basal cell carcinoma 10/12/10    left earlobe    Basal cell carcinoma 7/23/2014    right neck    Cancer     Hx of Squamous Cell CA Scalp    Conductive hearing loss, bilateral     Chronic Eustachian Tube Dysfunction    Frequency of urination     Gastroenteritis     Headache(784.0)     Hypertension     Kidney stones     Otitis media     Squamous Cell Carcinoma  7/27/07    SCC ant scalp    Supraventricular tachycardia    CVA 3-4 years ago - reports mild L sided weakness       Past Surgical History:   Procedure Laterality Date    ADENOIDECTOMY      Cardiac pacemaker implantation      COLONOSCOPY  2004    reportedly normal in 2004    Duputyren Contracture Right Hand      INSERT / REPLACE / REMOVE PACEMAKER      Resection of Skin Cancer Scalp      TONSILLECTOMY      UPPER GASTROINTESTINAL ENDOSCOPY  07/31/2017    [place], [place]; sent for scanning: small hiatal hernia, diffuse erythema scattered all throughout the stomach, not actively bleeding, but suggestive of possible hemorrhagic gastritis, mild bile reflux noted; no erosions or ulcers seen; no biopsy report received.       Family History   Problem Relation Age of Onset    Melanoma Neg Hx     Psoriasis Neg Hx     Lupus Neg Hx     Eczema Neg Hx     Colon cancer Neg Hx     Colon polyps Neg Hx     Crohn's disease Neg Hx     Ulcerative colitis Neg Hx     Stomach cancer Neg Hx     Esophageal cancer Neg Hx        Social History     Socioeconomic History    Marital status:      Spouse name: Not on file    Number of children: Not on file    Years of education: Not on file    Highest education level: Not on file   Occupational History    Occupation: Retired   Social Needs    Financial resource strain: Not on file    Food insecurity:     Worry: Not on file     Inability: Not on file    Transportation needs:     Medical: Not on file     Non-medical: Not on file   Tobacco Use    Smoking status: Never Smoker    Smokeless tobacco: Never Used   Substance and Sexual Activity    Alcohol use: Yes     Alcohol/week: 4.2 oz     Types: 7 Glasses of wine per week    Drug use: No    Sexual activity: Not on file   Lifestyle    Physical activity:     Days per week: Not on file     Minutes per session: Not on file    Stress: Not on file   Relationships    Social connections:     Talks on phone: Not on file      Gets together: Not on file     Attends Mu-ism service: Not on file     Active member of club or organization: Not on file     Attends meetings of clubs or organizations: Not on file     Relationship status: Not on file   Other Topics Concern    Not on file   Social History Narrative    Not on file       Current Outpatient Medications   Medication Sig Dispense Refill    antipyrine-benzocaine (AURALGAN OR EQUIV) 5.4-1.4 % Drop Place 3 drops into both ears 4 (four) times daily. 1 Bottle 3    atorvastatin (LIPITOR) 40 MG tablet Take 1 tablet (40 mg total) by mouth once daily. 90 tablet 3    C,E,zinc,copper 11/padub7k/lut (OCUVITE ADULT 50 PLUS ORAL) Take by mouth once daily.      guanFACINE (TENEX) 1 MG Tab TAKE 1 TABLET EVERY EVENING 90 tablet 1    guanFACINE (TENEX) 1 MG Tab TAKE 1 TABLET EVERY EVENING 90 tablet 1    lisinopril (PRINIVIL,ZESTRIL) 20 MG tablet Take 1 tablet (20 mg total) by mouth 2 (two) times daily. 180 tablet 1    loratadine-pseudoephedrine  mg (CLARITIN-D 24-HOUR)  mg per 24 hr tablet Take 1 tablet by mouth once daily.      magnesium oxide (MAG-OX) 400 mg (241.3 mg magnesium) tablet Take 1 tablet (400 mg total) by mouth once daily. 30 tablet 2    metoprolol succinate (TOPROL-XL) 50 MG 24 hr tablet Take 1 tablet (50 mg total) by mouth once daily. 90 tablet 1    pantoprazole (PROTONIX) 40 MG tablet Take 1 tablet (40 mg total) by mouth once daily. 90 tablet 1    rivaroxaban (XARELTO) 20 mg Tab Take 1 tablet (20 mg total) by mouth once daily. 90 tablet 3    vitamin D 1000 units Tab Take 1,000 Units by mouth once daily.       No current facility-administered medications for this visit.        Review of patient's allergies indicates:   Allergen Reactions    Amlodipine      Intolerant secondary to dizziness    Dyazide [triamterene-hydrochlorothiazid]      Significant weakness with his diuretic.       Review of Systems   Constitutional: Negative for chills, fever,  "malaise/fatigue and weight loss.   HENT: Positive for hearing loss.    Eyes: Negative.    Respiratory: Negative for cough and shortness of breath.    Cardiovascular: Positive for leg swelling. Negative for chest pain.   Gastrointestinal: Negative for abdominal pain, heartburn, nausea and vomiting.   Musculoskeletal: Negative for joint pain and myalgias.        Bilateral ankle swelling   Skin: Negative for itching and rash.   Neurological: Positive for focal weakness. Negative for dizziness and headaches.   Endo/Heme/Allergies: Bruises/bleeds easily.   Psychiatric/Behavioral: Negative for depression.       Vitals:    07/10/19 1340   BP: (!) 148/75   Pulse: 81   SpO2: 97%   Weight: 95.5 kg (210 lb 8.6 oz)   Height: 6' 1" (1.854 m)       Physical Exam   Constitutional: He is oriented to person, place, and time. He appears well-developed and well-nourished.   Ambulating with cane   HENT:   Head: Normocephalic and atraumatic.   Mouth/Throat: Oropharynx is clear and moist. No oropharyngeal exudate.   Hearing aids bilaterally   Eyes: Pupils are equal, round, and reactive to light. Conjunctivae are normal. No scleral icterus.   Neck: Normal range of motion. Neck supple. No thyromegaly present.   Cardiovascular: Normal rate, regular rhythm and normal heart sounds. Exam reveals no gallop and no friction rub.   No murmur heard.  Pulmonary/Chest: Effort normal and breath sounds normal. No respiratory distress. He has no wheezes. He has no rales.   Abdominal: Soft. Bowel sounds are normal. He exhibits no distension. There is no tenderness. A hernia (R inguinal) is present.   Musculoskeletal: Normal range of motion. He exhibits no edema.   Lymphadenopathy:     He has no cervical adenopathy.   Neurological: He is alert and oriented to person, place, and time. No cranial nerve deficit.   Mental status normal   Skin: Skin is warm and dry. No erythema.   Psychiatric: He has a normal mood and affect. His behavior is normal.   Vitals " reviewed.      Lab Results   Component Value Date    ALT 30 05/28/2019    AST 49 (H) 05/28/2019     (H) 01/17/2019    ALKPHOS 164 (H) 05/28/2019    BILITOT 1.1 (H) 05/28/2019       Lab Results   Component Value Date    WBC 5.91 05/28/2019    HGB 13.1 (L) 05/28/2019    HCT 38.5 (L) 05/28/2019     (H) 05/28/2019     (L) 05/28/2019       Lab Results   Component Value Date     05/28/2019    K 3.9 05/28/2019     05/28/2019    CO2 25 05/28/2019    BUN 15 05/28/2019    CREATININE 1.0 05/28/2019    CALCIUM 9.0 05/28/2019    ANIONGAP 5 (L) 05/28/2019    ESTGFRAFRICA >60.0 05/28/2019    EGFRNONAA >60.0 05/28/2019       Imaging: EMR and external imaging available reviewed   US with coarsened echotexture and heterogeneity concerning for fatty liver     Assessment:  89 y.o. male presenting with elevated liver tests of unclear etiology with concern for advanced fibrosis based on persistent elevation of liver tests and coarsened echotexture on imaging.  Serologic work-up with positive A1AT screening test and phenotype ordered.    Plan:  Elevated liver tests:  Discussed role of liver biopsy and increased bleeding risk given patient's use of anticoagulation.  Will hold for now.  Given current work-up with evidence of hypoalbuminemia and abnormal US along with LE edema, cold intolerance and easy bruising, strong suspicion for advanced liver disease.  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  Will proceed with US elastography.      Initiate lasix 20mg and spironolactone 50mg daily for LE edema.  Given information on low sodium, high protein diet.      Recommend hepatitis A and B vaccination.      If US elastography confirms advanced fibrosis, will continue cirrhosis HCM as indicated    RTC in 3 months

## 2019-07-10 NOTE — PATIENT INSTRUCTIONS
Please schedule lab for today.  (alpha-1 antitrypsin phenotype, CBC, CMP and INR)    Schedule ultrasound elastography in radiology within 1 month.      Schedule hepatitis A and B vaccination to start locally.      Educational materials on high protein and low sodium diet for liver disease.  It is important to limit sodium intake.        Start lasix 20mg and spironolactone 50mg daily for swelling.      Recommend vaccination for hepatitis A and B.      Return to clinic in 3 months

## 2019-07-18 ENCOUNTER — PATIENT MESSAGE (OUTPATIENT)
Dept: CARDIOLOGY | Facility: CLINIC | Age: 84
End: 2019-07-18

## 2019-07-19 DIAGNOSIS — I48.0 PAF (PAROXYSMAL ATRIAL FIBRILLATION): Primary | ICD-10-CM

## 2019-07-26 ENCOUNTER — OFFICE VISIT (OUTPATIENT)
Dept: INTERNAL MEDICINE | Facility: CLINIC | Age: 84
End: 2019-07-26
Payer: MEDICARE

## 2019-07-26 ENCOUNTER — HOSPITAL ENCOUNTER (OUTPATIENT)
Dept: RADIOLOGY | Facility: HOSPITAL | Age: 84
Discharge: HOME | End: 2019-07-26
Attending: INTERNAL MEDICINE
Payer: MEDICARE

## 2019-07-26 VITALS
HEART RATE: 76 BPM | SYSTOLIC BLOOD PRESSURE: 112 MMHG | WEIGHT: 196 LBS | DIASTOLIC BLOOD PRESSURE: 80 MMHG | BODY MASS INDEX: 25.86 KG/M2

## 2019-07-26 DIAGNOSIS — R79.89 ABNORMAL LFTS (LIVER FUNCTION TESTS): Primary | ICD-10-CM

## 2019-07-26 DIAGNOSIS — Z09 FOLLOW UP: ICD-10-CM

## 2019-07-26 DIAGNOSIS — R79.89 ELEVATED LIVER FUNCTION TESTS: ICD-10-CM

## 2019-07-26 PROCEDURE — 99999 PR PBB SHADOW E&M-EST. PATIENT-LVL II: CPT | Mod: PBBFAC,,, | Performed by: INTERNAL MEDICINE

## 2019-07-26 PROCEDURE — 91200 LIVER ELASTOGRAPHY: CPT | Mod: 26,,, | Performed by: RADIOLOGY

## 2019-07-26 PROCEDURE — 91200 US ELASTOGRAPHY LIVER: ICD-10-PCS | Mod: 26,,, | Performed by: RADIOLOGY

## 2019-07-26 PROCEDURE — 91200 LIVER ELASTOGRAPHY: CPT | Mod: TC

## 2019-07-26 PROCEDURE — 99213 OFFICE O/P EST LOW 20 MIN: CPT | Mod: S$PBB,,, | Performed by: INTERNAL MEDICINE

## 2019-07-26 PROCEDURE — 99999 PR PBB SHADOW E&M-EST. PATIENT-LVL II: ICD-10-PCS | Mod: PBBFAC,,, | Performed by: INTERNAL MEDICINE

## 2019-07-26 PROCEDURE — 99212 OFFICE O/P EST SF 10 MIN: CPT | Mod: PBBFAC,25 | Performed by: INTERNAL MEDICINE

## 2019-07-26 PROCEDURE — 99213 PR OFFICE/OUTPT VISIT, EST, LEVL III, 20-29 MIN: ICD-10-PCS | Mod: S$PBB,,, | Performed by: INTERNAL MEDICINE

## 2019-07-26 NOTE — PROGRESS NOTES
Subjective:       Patient ID: Roby Rodriguez is a 89 y.o. male.    Chief Complaint: Follow-up    [facility]. He has hx of elevated LFT's and is currently undergoing wprk up for this issue. He is seen by Dr Blanc in Hepatology and was recently seen by her for follow up. Additional studies were obtained at that time and these were discussed with Mr Ron and his daughter. He has A1AT deficiency as per blood work and additional studies, including phenotype is pending. The results of the liver elastography done today were also discussed showing mild-moderate fibrosis. The implications of this finding vis kalie future health issues was discussed. I also reiterated the recommendations of Dr Blanc that included medications he was recently put on including Lasix and spironolactone. These have helped with his edema and tolerating it well. Also discussed HCC monitoring as well. Overall he is doing well and I will see him back in about 2-3 months for follow up.  Review of Systems   All other systems reviewed and are negative.      Objective:      Physical Exam   Constitutional: He is oriented to person, place, and time. He appears well-developed and well-nourished. No distress.   Neurological: He is alert and oriented to person, place, and time.   Skin: He is not diaphoretic.   Psychiatric: He has a normal mood and affect. His behavior is normal. Judgment and thought content normal.   Vitals reviewed.      Assessment:       1. Follow up    2. Abnormal LFTs (liver function tests)        Plan:    1. As per HPI.         2. RTC 3 months or sooner if needed.

## 2019-08-07 NOTE — TELEPHONE ENCOUNTER
Pt Of Wayne Silverman MD    Last seen on: 07/26/2019    Next appt: n/a    Last refill: 02/16/2019    Allergies:   Review of patient's allergies indicates:   Allergen Reactions    Amlodipine      Intolerant secondary to dizziness    Dyazide [triamterene-hydrochlorothiazid]      Significant weakness with his diuretic.       [facility]   [facility] [place], [place] - [address] AT [address] [place] [address]  [facility] [address]  Phone: [phone] Fax: [phone]    Labs: Please review.      Please review! Thank you!

## 2019-08-09 RX ORDER — PANTOPRAZOLE SODIUM 40 MG/1
40 TABLET, DELAYED RELEASE ORAL DAILY
Qty: 90 TABLET | Refills: 1 | Status: SHIPPED | OUTPATIENT
Start: 2019-08-09 | End: 2020-01-01

## 2019-08-09 NOTE — TELEPHONE ENCOUNTER
Dear Dr Silverman:  Referred this medication to you to address as you are the patient's listed PCP.  Sincerely,  GAYLE Lambert MD

## 2019-08-11 ENCOUNTER — PATIENT MESSAGE (OUTPATIENT)
Dept: INTERNAL MEDICINE | Facility: CLINIC | Age: 84
End: 2019-08-11

## 2019-08-13 ENCOUNTER — OFFICE VISIT (OUTPATIENT)
Dept: URGENT CARE | Facility: CLINIC | Age: 84
End: 2019-08-13
Payer: MEDICARE

## 2019-08-13 VITALS
RESPIRATION RATE: 16 BRPM | OXYGEN SATURATION: 96 % | SYSTOLIC BLOOD PRESSURE: 95 MMHG | BODY MASS INDEX: 25.98 KG/M2 | HEIGHT: 73 IN | HEART RATE: 79 BPM | DIASTOLIC BLOOD PRESSURE: 61 MMHG | TEMPERATURE: 97 F | WEIGHT: 196 LBS

## 2019-08-13 DIAGNOSIS — K59.00 CONSTIPATION, UNSPECIFIED CONSTIPATION TYPE: Primary | ICD-10-CM

## 2019-08-13 PROCEDURE — 99214 PR OFFICE/OUTPT VISIT, EST, LEVL IV, 30-39 MIN: ICD-10-PCS | Mod: S$GLB,,, | Performed by: PHYSICIAN ASSISTANT

## 2019-08-13 PROCEDURE — 99214 OFFICE O/P EST MOD 30 MIN: CPT | Mod: S$GLB,,, | Performed by: PHYSICIAN ASSISTANT

## 2019-08-13 RX ORDER — LUBIPROSTONE 8 UG/1
8 CAPSULE ORAL 2 TIMES DAILY WITH MEALS
Qty: 14 CAPSULE | Refills: 0 | Status: SHIPPED | OUTPATIENT
Start: 2019-08-13 | End: 2019-08-20

## 2019-08-13 NOTE — PATIENT INSTRUCTIONS
Drink plenty of water  OTC suppository  Enema as needed  Walk as much as possible    You must understand that you've received an Urgent Care treatment only and that you may be released before all your medical problems are known or treated. You, the patient, will arrange for follow up care as instructed.  Follow up with your PCP or specialty clinic as directed in the next 1-2 weeks if not improved or as needed.  You can call [phone] to schedule an appointment with the appropriate provider.  If your condition worsens we recommend that you receive another evaluation at the emergency room immediately or contact your primary medical clinics after hours call service to discuss your concerns.  Please return here or go to the Emergency Department for any concerns or worsening of condition.

## 2019-08-13 NOTE — PROGRESS NOTES
"Subjective:       Patient ID: Roby Rodriguez is a 89 y.o. male.    Vitals:  height is 6' 1" (1.854 m) and weight is 88.9 kg (196 lb). His temperature is 96.9 °F (36.1 °C). His blood pressure is 95/61 and his pulse is 79. His respiration is 16 and oxygen saturation is 96%.     Chief Complaint: Back Pain (unable to urinate)    Patient fell back onto the toilet seat attempting to sit and landed hard. Pt states he can bend and walk fine, but is a little sore. Pt c/o not able to have a bowel movement since then. Pt's daughter states he just started 2 medications 4 days prior to his slip and pt's daughter thinks it may be related to the new medications.  Patient admits to less than adequate fluid intake.  They have attempted fiber supplements, magnesium citrate and Dulcolax without success.  Denies any associated abdominal pain, rectal bleeding or rectal pain. No difficulty urinating or incontinence.    Back Pain   This is a new problem. The current episode started in the past 7 days. The problem occurs constantly. The problem is unchanged. The pain is present in the lumbar spine. The pain does not radiate. The pain is at a severity of 1/10. The patient is experiencing no pain. Pertinent negatives include no chest pain, dysuria, fever or headaches. He has tried nothing for the symptoms. The treatment provided no relief.       Constitution: Negative for chills, fatigue and fever.   HENT: Negative for congestion and sore throat.    Neck: Negative for painful lymph nodes.   Cardiovascular: Negative for chest pain and leg swelling.   Eyes: Negative for double vision and blurred vision.   Respiratory: Negative for cough and shortness of breath.    Gastrointestinal: Positive for constipation. Negative for nausea, vomiting and diarrhea.   Genitourinary: Negative for dysuria, frequency and urgency.   Musculoskeletal: Positive for back pain. Negative for joint pain, joint swelling, muscle cramps and muscle ache.   Skin: Negative " for color change, pale and rash.   Allergic/Immunologic: Negative for seasonal allergies.   Neurological: Negative for dizziness, history of vertigo, light-headedness, passing out and headaches.   Hematologic/Lymphatic: Negative for swollen lymph nodes, easy bruising/bleeding and history of blood clots. Does not bruise/bleed easily.   Psychiatric/Behavioral: Negative for nervous/anxious, sleep disturbance and depression. The patient is not nervous/anxious.        Objective:      Physical Exam   Constitutional: He is oriented to person, place, and time. Vital signs are normal. He appears well-developed and well-nourished. He is active and cooperative. No distress.   HENT:   Head: Normocephalic and atraumatic.   Nose: Nose normal.   Mouth/Throat: Oropharynx is clear and moist and mucous membranes are normal.   Eyes: Conjunctivae and lids are normal.   Neck: Trachea normal, normal range of motion, full passive range of motion without pain and phonation normal. Neck supple.   Cardiovascular: Normal pulses.   Pulmonary/Chest: Effort normal and breath sounds normal.   Abdominal: Soft. Normal appearance and bowel sounds are normal. He exhibits no distension, no abdominal bruit, no pulsatile midline mass and no mass. There is no tenderness.   Musculoskeletal: He exhibits no edema or deformity.        Back:    Small ecchymoses with mild tenderness.   Neurological: He is alert and oriented to person, place, and time. He has normal strength and normal reflexes. No sensory deficit.   No saddle anesthesia, normal sensation bilateral lower extremities   Skin: Skin is warm, dry and intact. He is not diaphoretic.   Psychiatric: He has a normal mood and affect. His speech is normal and behavior is normal. Judgment and thought content normal. Cognition and memory are normal.   Nursing note and vitals reviewed.      Assessment:       1. Constipation, unspecified constipation type        Plan:         Constipation, unspecified  constipation type    Other orders  -     lubiprostone (AMITIZA) 8 MCG Cap; Take 1 capsule (8 mcg total) by mouth 2 (two) times daily with meals. for 7 days  Dispense: 14 capsule; Refill: 0     I do not suspect cauda equina syndrome.  He has no saddle anesthesia.  Normal sensation bilateral lower extremities.  He is ambulatory with cane as usual per daughter.  He is able to get in and out of bed without difficulty.  He is able to rise and see in the clinic room without difficulty.  He has small bruise on left buttock.  No bony tenderness noted.    Patient and daughter's main concern today is constipation.  He started to fluid pills prior to the onset of constipation.  He does admit to less than adequate fluid intake at home.  He even questions why his urine is orange.  Patient declined rectal exam in clinic.  Discussed adequate fluid intake.  May try rectal suppositories and or enemas.  Would continue with fiber intake, but re-emphasized adequate fluid intake.  Will give short course Amitiza.  Patient and daughter instructed to go to ED with any abdominal pain, severe rectal pain or inability to have bowel movement with all recommendations.  Patient and daughter state understanding and agree with plan.    Drink plenty of water  OTC suppository  Enema as needed  Walk as much as possible    You must understand that you've received an Urgent Care treatment only and that you may be released before all your medical problems are known or treated. You, the patient, will arrange for follow up care as instructed.  Follow up with your PCP or specialty clinic as directed in the next 1-2 weeks if not improved or as needed.  You can call [phone] to schedule an appointment with the appropriate provider.  If your condition worsens we recommend that you receive another evaluation at the emergency room immediately or contact your primary medical clinics after hours call service to discuss your concerns.  Please return here or  go to the Emergency Department for any concerns or worsening of condition.

## 2019-08-14 NOTE — TELEPHONE ENCOUNTER
----- Message from Nolan Basurto sent at 8/14/2019  3:27 PM CDT -----  Contact: pt's daughter Erika  Type:  Patient Returning Call    Who Called:  Erika  Who Left Message for Patient:  Not sure  Does the patient know what this is regarding?:  Not sure. Maybe about getting refill  Best Call Back Number:  [phone]  Additional Information:  Please leave a detailed message incase she is unable to answer.

## 2019-08-14 NOTE — TELEPHONE ENCOUNTER
Phoned pt to inform of prescription refill sent to pharmacy, no response, lvm to return call to clinic.

## 2019-08-15 NOTE — PROGRESS NOTES
Mr. Rodriguez is a patient of Dr. Velazquez and was last seen in clinic 1/14/2019.      Subjective:   Patient ID:  Roby Rodriguez is a 89 y.o. male who presents for follow-up of Pacemaker Check  .     HPI:    Mr. Rodriguez is an 88 yo male with HTN, PAC's, PVC's, tachy-kunal syndrome, PPM, CVA here for follow up.     H/o recurrent dizziness, and atrial arrhythmias.  Holter monitor obtained, revealed nsr with >1900 PVC's and > 4500 PAC's, with runs of nonsustained AT   Event monitor: one symptomatic transmission. Revealed nsr >> atrial fibrillation.  Dual chamber PPM implanted 7/22/13.   Subsequent device interrogation revealed atrial fibrillation with RVR. Toprol increased, coumadin initiated. Noted significant improvement in terms of dizziness and energy level.  Presented 3/31/17 with stroke.  Thought to be cardio-embolic in origin.  INR subtherapeutic at 1.7 upon admission.  Aspirin added.  We discontinued aspirin and coumadin, initiated Xarelto.  He has been doing well, maintaining his travels.    Update (08/16/2019):    Today he continues to do well. Did have a slip and fall recently and twisted his back which aches a bit, but did not hit his head. Denies LH or LOC. Denies cardiac symptoms. Mr. Rodriguez denies chest pain with exertion or at rest, palpitations, SOB, FABIAN.    He is currently taking xarelto 20mg daily for stroke prophylaxis and denies significant bleeding episodes. He is currently being treated with metoprolol succinate 50mg daily for HR control. Kidney function is stable, with a creatinine of 0.9 on 7/10/2019.    Device Interrogation (8/16/2019) reveals an intrinsic SB with stable lead and device function. NSVTx1, 2 seconds. AT/AFx2, 1 min 13 seconds. He paces 92% in the RA and 0.6% in the RV. Estimated battery longevity 4.5 years.     I have personally reviewed the patient's EKG today, which shows APVS with occ PVCs at 66bpm. MT interval is 108. QRS is 96. QTc is 448.    Recent Cardiac Tests:    2D Echo  (4/1/2017):  CONCLUSIONS     1 - Normal left ventricular systolic function (EF 55-60%).     2 - Normal left ventricular diastolic function.     3 - Normal right ventricular systolic function .     4 - The estimated PA systolic pressure is greater than 20 mmHg.     5 - Trivial tricuspid regurgitation.     Current Outpatient Medications   Medication Sig    antipyrine-benzocaine (AURALGAN OR EQUIV) 5.4-1.4 % Drop Place 3 drops into both ears 4 (four) times daily.    atorvastatin (LIPITOR) 40 MG tablet Take 1 tablet (40 mg total) by mouth once daily.    C,E,zinc,copper 11/svyss1d/lut (OCUVITE ADULT 50 PLUS ORAL) Take by mouth once daily.    furosemide (LASIX) 20 MG tablet Take 1 tablet (20 mg total) by mouth once daily.    guanFACINE (TENEX) 1 MG Tab TAKE 1 TABLET EVERY EVENING    lisinopril (PRINIVIL,ZESTRIL) 20 MG tablet Take 1 tablet (20 mg total) by mouth 2 (two) times daily.    loratadine-pseudoephedrine  mg (CLARITIN-D 24-HOUR)  mg per 24 hr tablet Take 1 tablet by mouth once daily.    lubiprostone (AMITIZA) 8 MCG Cap Take 1 capsule (8 mcg total) by mouth 2 (two) times daily with meals. for 7 days    metoprolol succinate (TOPROL-XL) 50 MG 24 hr tablet Take 1 tablet (50 mg total) by mouth once daily.    pantoprazole (PROTONIX) 40 MG tablet Take 1 tablet (40 mg total) by mouth once daily.    rivaroxaban (XARELTO) 20 mg Tab Take 1 tablet (20 mg total) by mouth once daily.    spironolactone (ALDACTONE) 50 MG tablet Take 1 tablet (50 mg total) by mouth once daily.    vitamin D 1000 units Tab Take 1,000 Units by mouth once daily.    magnesium oxide (MAG-OX) 400 mg (241.3 mg magnesium) tablet Take 1 tablet (400 mg total) by mouth once daily.     No current facility-administered medications for this visit.      Review of Systems   Constitution: Negative for malaise/fatigue.   Cardiovascular: Negative for chest pain, dyspnea on exertion, irregular heartbeat, leg swelling and palpitations.  "  Respiratory: Negative for shortness of breath.    Hematologic/Lymphatic: Negative for bleeding problem.   Skin: Negative for rash.   Musculoskeletal: Positive for back pain. Negative for myalgias.   Gastrointestinal: Negative for hematemesis, hematochezia and nausea.   Genitourinary: Negative for hematuria.   Neurological: Negative for light-headedness.   Psychiatric/Behavioral: Negative for altered mental status.   Allergic/Immunologic: Negative for persistent infections.     Objective:        /60   Pulse 66   Ht 6' 1" (1.854 m)   Wt 88.9 kg (195 lb 15.8 oz)   BMI 25.86 kg/m²     Physical Exam   Constitutional: He is oriented to person, place, and time. He appears well-developed and well-nourished.   HENT:   Head: Normocephalic.   Nose: Nose normal.   Eyes: Pupils are equal, round, and reactive to light.   Cardiovascular: Normal rate, regular rhythm, S1 normal and S2 normal.   No murmur heard.  Pulses:       Radial pulses are 2+ on the right side, and 2+ on the left side.   Pulmonary/Chest: Breath sounds normal. No respiratory distress.   Device to LUCW.   Abdominal: Normal appearance.   Musculoskeletal: Normal range of motion. He exhibits no edema.   Neurological: He is alert and oriented to person, place, and time.   Skin: Skin is warm and dry. No erythema.   Psychiatric: He has a normal mood and affect. His speech is normal and behavior is normal.   Nursing note and vitals reviewed.    Lab Results   Component Value Date     07/10/2019    K 4.2 07/10/2019    MG 1.8 01/17/2019    BUN 15 07/10/2019    CREATININE 0.9 07/10/2019    ALT 30 07/10/2019    AST 50 (H) 07/10/2019    HGB 13.5 (L) 07/10/2019    HCT 40.8 07/10/2019    TSH 3.338 01/17/2019    LDLCALC 70.0 01/17/2019       Recent Labs   Lab 04/04/17  1400 04/12/17  1028 05/28/19  1614 07/10/19  1458   INR 2.0 2.8 H 1.6 H 1.6 H       Assessment:     1. Pacemaker    2. SVT (supraventricular tachycardia)    3. Premature ventricular contractions " (PVCs) (VPCs)    4. Premature atrial complexes    5. Paroxysmal atrial fibrillation    6. Tachy-kunal syndrome    7. Essential hypertension    8. Completed stroke    9. Long term current use of anticoagulant therapy    10. PAF (paroxysmal atrial fibrillation)      Plan:     In summary, Mr. Rodriguez is an 89 yo male with HTN, PAC's, PVC's, tachy-kunal syndrome, PPM, CVA here for follow up.  Mr. Rodriguez is doing well from a device perspective with stable lead and device function. Minimal arrhythmia noted. He remains on xarelto for CVA prophylaxis.No RV pacing. No CHF symptoms. He is feeling well, other than a recent slip and fall from which he is recovering rapidly. He remains very active.    Continue current medication regimen and device settings.   Follow up in device clinic as scheduled.   Follow up in EP clinic in 6 months with Dr. Velazquez, sooner as needed.     *A copy of this note has been sent to Dr. Velazquez*    Follow up in about 6 months (around 2/16/2020).    ------------------------------------------------------------------    BRANDI Bernardo, NP-C  Cardiac Electrophysiology

## 2019-08-16 ENCOUNTER — CLINICAL SUPPORT (OUTPATIENT)
Dept: CARDIOLOGY | Facility: HOSPITAL | Age: 84
End: 2019-08-16
Attending: INTERNAL MEDICINE
Payer: MEDICARE

## 2019-08-16 ENCOUNTER — TELEPHONE (OUTPATIENT)
Dept: URGENT CARE | Facility: CLINIC | Age: 84
End: 2019-08-16

## 2019-08-16 ENCOUNTER — HOSPITAL ENCOUNTER (OUTPATIENT)
Dept: CARDIOLOGY | Facility: CLINIC | Age: 84
Discharge: HOME | End: 2019-08-16
Payer: MEDICARE

## 2019-08-16 ENCOUNTER — OFFICE VISIT (OUTPATIENT)
Dept: ELECTROPHYSIOLOGY | Facility: CLINIC | Age: 84
End: 2019-08-16
Attending: INTERNAL MEDICINE
Payer: MEDICARE

## 2019-08-16 VITALS
BODY MASS INDEX: 25.98 KG/M2 | WEIGHT: 196 LBS | DIASTOLIC BLOOD PRESSURE: 60 MMHG | HEART RATE: 66 BPM | SYSTOLIC BLOOD PRESSURE: 110 MMHG | HEIGHT: 73 IN

## 2019-08-16 DIAGNOSIS — I10 ESSENTIAL HYPERTENSION: ICD-10-CM

## 2019-08-16 DIAGNOSIS — Z95.0 PACEMAKER: Primary | ICD-10-CM

## 2019-08-16 DIAGNOSIS — I48.0 PAF (PAROXYSMAL ATRIAL FIBRILLATION): ICD-10-CM

## 2019-08-16 DIAGNOSIS — I49.5 TACHY-BRADY SYNDROME: ICD-10-CM

## 2019-08-16 DIAGNOSIS — I49.3 PREMATURE VENTRICULAR CONTRACTIONS (PVCS) (VPCS): ICD-10-CM

## 2019-08-16 DIAGNOSIS — I63.9 COMPLETED STROKE: ICD-10-CM

## 2019-08-16 DIAGNOSIS — I47.10 SVT (SUPRAVENTRICULAR TACHYCARDIA): ICD-10-CM

## 2019-08-16 DIAGNOSIS — I49.1 PREMATURE ATRIAL COMPLEXES: ICD-10-CM

## 2019-08-16 DIAGNOSIS — Z79.01 LONG TERM CURRENT USE OF ANTICOAGULANT THERAPY: ICD-10-CM

## 2019-08-16 DIAGNOSIS — I48.0 PAROXYSMAL ATRIAL FIBRILLATION: ICD-10-CM

## 2019-08-16 PROCEDURE — 99214 PR OFFICE/OUTPT VISIT, EST, LEVL IV, 30-39 MIN: ICD-10-PCS | Mod: S$PBB,,, | Performed by: NURSE PRACTITIONER

## 2019-08-16 PROCEDURE — 99999 PR PBB SHADOW E&M-EST. PATIENT-LVL III: ICD-10-PCS | Mod: PBBFAC,,, | Performed by: NURSE PRACTITIONER

## 2019-08-16 PROCEDURE — 93005 ELECTROCARDIOGRAM TRACING: CPT | Mod: PBBFAC,59 | Performed by: INTERNAL MEDICINE

## 2019-08-16 PROCEDURE — 93010 ELECTROCARDIOGRAM REPORT: CPT | Mod: S$PBB,,, | Performed by: INTERNAL MEDICINE

## 2019-08-16 PROCEDURE — 99213 OFFICE O/P EST LOW 20 MIN: CPT | Mod: PBBFAC,25 | Performed by: NURSE PRACTITIONER

## 2019-08-16 PROCEDURE — 99214 OFFICE O/P EST MOD 30 MIN: CPT | Mod: S$PBB,,, | Performed by: NURSE PRACTITIONER

## 2019-08-16 PROCEDURE — 93010 RHYTHM STRIP: ICD-10-PCS | Mod: S$PBB,,, | Performed by: INTERNAL MEDICINE

## 2019-08-16 PROCEDURE — 93280 PM DEVICE PROGR EVAL DUAL: CPT

## 2019-08-16 PROCEDURE — 99999 PR PBB SHADOW E&M-EST. PATIENT-LVL III: CPT | Mod: PBBFAC,,, | Performed by: NURSE PRACTITIONER

## 2019-08-16 NOTE — LETTER
August 16, 2019      Vincenzo Velazquez MD  [address]  [facility] [address]           [facility] - Arrhythmia  [address]  [facility] [address]  Phone: [phone]  Fax: [phone]          Patient: Roby Rodriguez   MR Number: [id number]   YOB: 1930   Date of Visit: 8/16/2019       Dear Dr. Vincenzo Velazquez:    Thank you for referring Roby Rodriguez to me for evaluation. Attached you will find relevant portions of my assessment and plan of care.    If you have questions, please do not hesitate to call me. I look forward to following Roby Rodriguez along with you.    Sincerely,    Bruna Mcmanus, NP    Enclosure  CC:  No Recipients    If you would like to receive this communication electronically, please contact [email or web address] or [phone] to request more information on [employer] Link access.    For providers and/or their staff who would like to refer a patient to [facility], please contact us through our one-stop-shop provider referral line, [facility], at [phone].    If you feel you have received this communication in error or would no longer like to receive these types of communications, please e-mail [email or web address]

## 2019-08-21 ENCOUNTER — PATIENT MESSAGE (OUTPATIENT)
Dept: INTERNAL MEDICINE | Facility: CLINIC | Age: 84
End: 2019-08-21

## 2019-08-23 DIAGNOSIS — J30.9 ALLERGIC RHINITIS, UNSPECIFIED SEASONALITY, UNSPECIFIED TRIGGER: Primary | ICD-10-CM

## 2019-08-23 RX ORDER — GUANFACINE 1 MG/1
1 TABLET ORAL NIGHTLY
Qty: 90 TABLET | Refills: 1 | Status: SHIPPED | OUTPATIENT
Start: 2019-08-23 | End: 2020-01-01

## 2019-09-04 NOTE — TELEPHONE ENCOUNTER
Pt Of Wayne Silverman MD    Last seen on: 07/26/2019    Next appt: n/a    Last refill: 8/2019    Allergies:   Review of patient's allergies indicates:   Allergen Reactions    Amlodipine      Intolerant secondary to dizziness    Dyazide [triamterene-hydrochlorothiazid]      Significant weakness with his diuretic.       [facility]:     [facility] - [place], [place] - [address] AT [address] [place] [address]  [facility] [address]  Phone: [phone] Fax: [phone]        Labs: Please review.      Please review! Thank you!

## 2019-09-05 RX ORDER — METOPROLOL SUCCINATE 50 MG/1
50 TABLET, EXTENDED RELEASE ORAL DAILY
Qty: 90 TABLET | Refills: 3 | Status: SHIPPED | OUTPATIENT
Start: 2019-09-05 | End: 2020-01-01 | Stop reason: SDUPTHER

## 2019-09-05 NOTE — TELEPHONE ENCOUNTER
Attempted to contact patient in regards to refill, left voice message for pt to return call to clinic.

## 2019-10-13 ENCOUNTER — CLINICAL SUPPORT (OUTPATIENT)
Dept: CARDIOLOGY | Facility: CLINIC | Age: 84
End: 2019-10-13
Payer: MEDICARE

## 2019-10-13 PROCEDURE — 93294 CARDIAC DEVICE CHECK - REMOTE: ICD-10-PCS | Mod: ,,, | Performed by: INTERNAL MEDICINE

## 2019-10-13 PROCEDURE — 93294 REM INTERROG EVL PM/LDLS PM: CPT | Mod: ,,, | Performed by: INTERNAL MEDICINE

## 2019-10-31 NOTE — PROGRESS NOTES
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 male that presents to hepatology clinic for follow-up of elevated liver tests.  He is accompanied by his daughter.      The patient was last seen in clinic on 7/10/2019.  Previously referred for evaluation of elevated liver tests with concern for advanced liver disease.      The patient had no clinical history of chronic liver disease and only symptom was mild LE and hand swelling which has been present over the last 8-10 months.  He is noted to have elevated liver tests since .   Risk factors for VERMA present along with prior history of CVA for which he is on anticoagulation with Xarelto.    Serologic work-up only remarkable for low A1AT level.      Currently endorses other symptoms of cold intolerance and easy bruising.       Interval history:  No interval hospitalizations.  Patient denies current symptoms of CLD.  Reports improved LE swelling with diuretics.      Cirrhosis HCM:  Hepatitis A vaccination: needs vaccination  Hepatitis B vaccination: needs vaccination   HCC screenin2019  Variceal screening:  Pneumococcal vaccination  Influenza vaccination: 2019  Colonoscopy: no longer indicated    Patient Active Problem List   Diagnosis    BPH (benign prostatic hypertrophy)    Kidney stones    Vertigo    Premature ventricular contractions (PVCs) (VPCs)    SVT (supraventricular tachycardia)    Premature atrial complexes    Paroxysmal atrial fibrillation    Bradycardia    Tachy-kunal syndrome    Pacemaker    Hearing loss, sensorineural    Long term current use of anticoagulant therapy    Essential hypertension    Mastoiditis of right side    Completed stroke    PUD (peptic ulcer disease)    Muscle weakness    Left arm pain       Past Medical History:   Diagnosis Date    *Atrial fibrillation     Allergy     Arthritis     Atrial fibrillation     Basal cell carcinoma  10/12/10    left earlobe    Basal cell carcinoma 7/23/2014    right neck    Cancer     Hx of Squamous Cell CA Scalp    Conductive hearing loss, bilateral     Chronic Eustachian Tube Dysfunction    Frequency of urination     Gastroenteritis     Headache(784.0)     Hypertension     Kidney stones     Otitis media     Squamous Cell Carcinoma 7/27/07    SCC ant scalp    Supraventricular tachycardia    CVA 3-4 years ago - reports mild L sided weakness       Past Surgical History:   Procedure Laterality Date    ADENOIDECTOMY      Cardiac pacemaker implantation      COLONOSCOPY  2004    reportedly normal in 2004    Duputyren Contracture Right Hand      INSERT / REPLACE / REMOVE PACEMAKER      Resection of Skin Cancer Scalp      TONSILLECTOMY      UPPER GASTROINTESTINAL ENDOSCOPY  07/31/2017    [place], [place]; sent for scanning: small hiatal hernia, diffuse erythema scattered all throughout the stomach, not actively bleeding, but suggestive of possible hemorrhagic gastritis, mild bile reflux noted; no erosions or ulcers seen; no biopsy report received.       Family History   Problem Relation Age of Onset    Melanoma Neg Hx     Psoriasis Neg Hx     Lupus Neg Hx     Eczema Neg Hx     Colon cancer Neg Hx     Colon polyps Neg Hx     Crohn's disease Neg Hx     Ulcerative colitis Neg Hx     Stomach cancer Neg Hx     Esophageal cancer Neg Hx        Social History     Socioeconomic History    Marital status:      Spouse name: Not on file    Number of children: Not on file    Years of education: Not on file    Highest education level: Not on file   Occupational History    Occupation: Retired   Social Needs    Financial resource strain: Not on file    Food insecurity:     Worry: Not on file     Inability: Not on file    Transportation needs:     Medical: Not on file     Non-medical: Not on file   Tobacco Use    Smoking status: Never Smoker    Smokeless tobacco: Never Used   Substance and  Sexual Activity    Alcohol use: Yes     Alcohol/week: 7.0 standard drinks     Types: 7 Glasses of wine per week    Drug use: No    Sexual activity: Not on file   Lifestyle    Physical activity:     Days per week: Not on file     Minutes per session: Not on file    Stress: Not on file   Relationships    Social connections:     Talks on phone: Not on file     Gets together: Not on file     Attends Jew service: Not on file     Active member of club or organization: Not on file     Attends meetings of clubs or organizations: Not on file     Relationship status: Not on file   Other Topics Concern    Not on file   Social History Narrative    Not on file       Current Outpatient Medications   Medication Sig Dispense Refill    atorvastatin (LIPITOR) 40 MG tablet Take 1 tablet (40 mg total) by mouth once daily. 90 tablet 3    C,E,zinc,copper 11/iwufc1h/lut (OCUVITE ADULT 50 PLUS ORAL) Take by mouth once daily.      furosemide (LASIX) 20 MG tablet Take 1 tablet (20 mg total) by mouth once daily. 90 tablet 3    guanFACINE (TENEX) 1 MG Tab Take 1 tablet (1 mg total) by mouth every evening. 90 tablet 1    lisinopril (PRINIVIL,ZESTRIL) 20 MG tablet Take 1 tablet (20 mg total) by mouth 2 (two) times daily. 180 tablet 1    loratadine (CLARITIN) 10 mg tablet Take 10 mg by mouth once daily.      metoprolol succinate (TOPROL-XL) 50 MG 24 hr tablet Take 1 tablet (50 mg total) by mouth once daily. 90 tablet 3    pantoprazole (PROTONIX) 40 MG tablet Take 1 tablet (40 mg total) by mouth once daily. 90 tablet 1    rivaroxaban (XARELTO) 20 mg Tab Take 1 tablet (20 mg total) by mouth once daily. 90 tablet 3    spironolactone (ALDACTONE) 50 MG tablet Take 1 tablet (50 mg total) by mouth once daily. 90 tablet 3    vitamin D 1000 units Tab Take 1,000 Units by mouth once daily.      antipyrine-benzocaine (AURALGAN OR EQUIV) 5.4-1.4 % Drop Place 3 drops into both ears 4 (four) times daily. (Patient not taking: Reported  "on 10/31/2019) 1 Bottle 3    flu vacc [id number],65yr up,PF (FLUZONE HIGH-DOSE 2019-20, PF,) 180 mcg/0.5 mL Syrg Inject as directed. (Patient not taking: Reported on 10/31/2019) 0.5 mL 0    loratadine-pseudoephedrine  mg (CLARITIN-D 24-HOUR)  mg per 24 hr tablet Take 1 tablet by mouth once daily.      magnesium oxide (MAG-OX) 400 mg (241.3 mg magnesium) tablet Take 1 tablet (400 mg total) by mouth once daily. (Patient not taking: Reported on 10/31/2019) 30 tablet 2     No current facility-administered medications for this visit.        Review of patient's allergies indicates:   Allergen Reactions    Amlodipine      Intolerant secondary to dizziness    Dyazide [triamterene-hydrochlorothiazid]      Significant weakness with his diuretic.       Review of Systems   Constitutional: Negative for chills, fever, malaise/fatigue and weight loss.   HENT: Positive for hearing loss.    Eyes: Negative.    Respiratory: Negative for cough and shortness of breath.    Cardiovascular: Positive for leg swelling. Negative for chest pain.   Gastrointestinal: Negative for abdominal pain, heartburn, nausea and vomiting.   Musculoskeletal: Negative for joint pain and myalgias.        Bilateral ankle swelling   Skin: Negative for itching and rash.   Neurological: Positive for focal weakness. Negative for dizziness and headaches.   Endo/Heme/Allergies: Bruises/bleeds easily.   Psychiatric/Behavioral: Negative for depression.       Vitals:    10/31/19 1604   BP: 115/64   Pulse: 62   Resp: 18   Temp: 97.5 °F (36.4 °C)   TempSrc: Oral   SpO2: 99%   Weight: 89 kg (196 lb 3.4 oz)   Height: 6' 1" (1.854 m)       Physical Exam   Constitutional: He is oriented to person, place, and time. He appears well-developed and well-nourished.   Ambulating with cane   HENT:   Head: Normocephalic and atraumatic.   Mouth/Throat: Oropharynx is clear and moist. No oropharyngeal exudate.   Hearing aids bilaterally   Eyes: Pupils are equal, round, " and reactive to light. Conjunctivae are normal. No scleral icterus.   Neck: Normal range of motion. Neck supple. No thyromegaly present.   Cardiovascular: Normal rate, regular rhythm and normal heart sounds. Exam reveals no gallop and no friction rub.   No murmur heard.  Pulmonary/Chest: Effort normal and breath sounds normal. No respiratory distress. He has no wheezes. He has no rales.   Abdominal: Soft. Bowel sounds are normal. He exhibits no distension. There is no tenderness. A hernia (R inguinal) is present.   Musculoskeletal: Normal range of motion. He exhibits no edema.   Lymphadenopathy:     He has no cervical adenopathy.   Neurological: He is alert and oriented to person, place, and time. No cranial nerve deficit.   Mental status normal   Skin: Skin is warm and dry. No erythema.   Psychiatric: He has a normal mood and affect. His behavior is normal.   Vitals reviewed.      Lab Results   Component Value Date    ALT 29 10/24/2019    AST 36 10/24/2019     (H) 01/17/2019    ALKPHOS 142 (H) 10/24/2019    BILITOT 1.8 (H) 10/24/2019       Lab Results   Component Value Date    WBC 7.49 10/24/2019    HGB 11.6 (L) 10/24/2019    HCT 34.1 (L) 10/24/2019     (H) 10/24/2019     10/24/2019       Lab Results   Component Value Date     10/24/2019    K 4.1 10/24/2019     10/24/2019    CO2 25 10/24/2019    BUN 28 (H) 10/24/2019    CREATININE 1.3 10/24/2019    CALCIUM 9.1 10/24/2019    ANIONGAP 5 (L) 10/24/2019    ESTGFRAFRICA 55.9 (A) 10/24/2019    EGFRNONAA 48.4 (A) 10/24/2019       Imaging: EMR and external imaging available reviewed   US with coarsened echotexture and heterogeneity concerning for fatty liver     Assessment:  89 y.o. male presenting with elevated liver tests of unclear etiology with concern for advanced fibrosis based on persistent elevation of liver tests and coarsened echotexture on imaging.  Serologic work-up with positive A1AT screening test and phenotype  ordered.    Plan:  Elevated liver tests:  Based on imaging and prolonged history of elevated liver tests, US elastography performed and concern for F2-3 fibrosis.  Yet based on clinical symptoms, I presume advanced liver disease and after discussing risks and benefits of liver biopsy, decision to conservatively manage as presumed cirrhosis.  This will include HCC screening and updating vaccinations including hep A and B.  Liver function is good at this time and labs reviewed with patient and daughter during visit.     Reduce lasix 20mg and spironolactone 50mg every other daily for LE edema.  Continue low sodium diet    Recommend hepatitis A and B vaccination.      RTC in 6 months

## 2019-10-31 NOTE — PATIENT INSTRUCTIONS
Schedule ultrasound for 12/10/2019, same day as cardiology appointment.    You may reduce fluid pills to every other day.    Your liver function is improved.      Return to clinic in 6 months

## 2019-11-01 NOTE — PROGRESS NOTES
Subjective:       Patient ID: Roby Rodriguez is a 89 y.o. male.    Chief Complaint: Follow-up    Go Ron is a pleasant gentleman from [place] here for follow up. Overall doing well and had no complaints. He states he recently started working out at local gym doing circuit training. He feels well about that and is benefitting from the exercise. States he sleeps, eats well. Has nocturia x 2, but not bothersome. He has hx of elevated LFT's and is being followed by Hepatology Clinic. He is stable in that regard and blood work obtained shows mild increase in alk phos at 142, total bili at 1.8, albumin at 2.8. These are not much changed from previous exams except total bili. His renal function has shown some deterioration with increased creatinine at 1.8. This is a fasting blood work and will repeat renal function when he is hydrated and proceed from there. He is not taking any new medications I am aware of and is on Lisinopril, lasix as needed and Aldactone 50 mgs. The diuretics may be playing a role in this and will monitor closely. I will repeat these labs in 2 weeks and proceed from there. Other parameters were unremarkable.  Review of Systems   All other systems reviewed and are negative.      Objective:      Physical Exam   Constitutional: He is oriented to person, place, and time. He appears well-developed and well-nourished. No distress.   Neck: Normal range of motion. Neck supple. No JVD present. No thyromegaly present.   Cardiovascular: Normal rate, regular rhythm, normal heart sounds and intact distal pulses.   No murmur heard.  Trace ankle LE edema.   Pulmonary/Chest: Effort normal and breath sounds normal. No respiratory distress. He has no wheezes.   Abdominal: Soft. Bowel sounds are normal. He exhibits no distension. There is no tenderness.   Lymphadenopathy:     He has no cervical adenopathy.   Neurological: He is alert and oriented to person, place, and time. No cranial nerve deficit.   Skin:  Skin is warm and dry. He is not diaphoretic.   Psychiatric: He has a normal mood and affect. His behavior is normal.       Assessment:       1. Hypertension, unspecified type    2. Vitamin D deficiency    3. Hyperlipidemia, unspecified hyperlipidemia type     3.    Out-of range labs as detailed above.  Plan:    1. Repeat blood work in 2 weeks.         2. Continue with current medications.         3. RTC in 3 months and as above.

## 2019-11-14 NOTE — PATIENT INSTRUCTIONS

## 2019-11-14 NOTE — PROGRESS NOTES
Subjective:       Patient ID:  Roby Rodriguez is a 89 y.o. male who presents for   Chief Complaint   Patient presents with    Skin Check     UBSE     Pt last seen 5-28-19 for AKs tx with cryo  *PACEMAKER    Here today for UBSE, no new concerns.    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        Review of Systems   Constitutional: Negative for fever, chills and fatigue.   Skin: Positive for wears hat. Negative for daily sunscreen use and activity-related sunscreen use.   Hematologic/Lymphatic: Does not bruise/bleed easily.        Objective:    Physical Exam   Constitutional: He appears well-developed and well-nourished.   Neurological: He is alert.   Psychiatric: He has a normal mood and affect.   Skin:   Areas Examined (abnormalities noted in diagram):   Scalp / Hair Palpated and Inspected  Head / Face Inspection Performed  Neck Inspection Performed  Chest / Axilla Inspection Performed  Abdomen Inspection Performed  Back Inspection Performed  RUE Inspected  LUE Inspection Performed                       Diagram Legend     Erythematous scaling macule/papule c/w actinic keratosis       Vascular papule c/w angioma      Pigmented verrucoid papule/plaque c/w seborrheic keratosis      Yellow umbilicated papule c/w sebaceous hyperplasia      Irregularly shaped tan macule c/w lentigo     1-2 mm smooth white papules consistent with Milia      Movable subcutaneous cyst with punctum c/w epidermal inclusion cyst      Subcutaneous movable cyst c/w pilar cyst      Firm pink to brown papule c/w dermatofibroma      Pedunculated fleshy papule(s) c/w skin tag(s)      Evenly pigmented  macule c/w junctional nevus     Mildly variegated pigmented, slightly irregular-bordered macule c/w mildly atypical nevus      Flesh colored to evenly pigmented papule c/w intradermal nevus       Pink pearly papule/plaque c/w basal cell carcinoma      Erythematous hyperkeratotic cursted plaque c/w SCC      Surgical scar with no sign of skin cancer recurrence      Open and closed comedones      Inflammatory papules and pustules      Verrucoid papule consistent consistent with wart     Erythematous eczematous patches and plaques     Dystrophic onycholytic nail with subungual debris c/w onychomycosis     Umbilicated papule    Erythematous-base heme-crusted tan verrucoid plaque consistent with inflamed seborrheic keratosis     Erythematous Silvery Scaling Plaque c/w Psoriasis     See annotation      Assessment / Plan:        Actinic keratoses  Cryosurgery Procedure Note    Verbal consent from the patient is obtained and the patient is aware of the precancerous quality and need for treatment of these lesions. Liquid nitrogen cryosurgery is applied to the 11 actinic keratoses, as detailed in the physical exam, to produce a freeze injury. The patient is aware that blisters may form and is instructed on wound care with gentle cleansing and use of vaseline ointment to keep moist until healed. The patient is supplied a handout on cryosurgery and is instructed to call if lesions do not completely resolve.    History of nonmelanoma skin cancer  Area of previous NMSC (multiple) examined. Site well healed with no signs of recurrence.  Upper body skin examination performed today including at least 9 points as noted in physical examination. No lesions suspicious for malignancy noted.    Seborrheic keratoses  These are benign inherited growths without a malignant potential. Reassurance given to patient. No treatment is necessary.     Solar lentigo  This is a benign hyperpigmented sun induced lesion. Daily sun protection will reduce  the number of new lesions. Treatment of these benign lesions are considered cosmetic.    Milia  ,Reassurance given to patient. No treatment is necessary.   Treatment of benign, asymptomatic lesions may be considered cosmetic.    Patient instructed in importance in daily sun protection of at least spf 30. Mineral sunscreen ingredients preferred (Zinc +/- Titanium).   Recommend Elta MD for daily use on face and neck.  Patient encouraged to wear hat for all outdoor exposure.   Also discussed sun avoidance and use of protective clothing.               No follow-ups on file.

## 2019-11-20 NOTE — TELEPHONE ENCOUNTER
Appointment made 1/6/20 at 220 pm----- Message from Esther Moon sent at 11/20/2019  7:07 AM CST -----  Contact: Self  Pt made contact via [facility] Portal as follows:    Appointment Request From: Roby Rodriguez    With Provider: Vincenzo Velazquez MD [[facility]]    Preferred Date Range: Any    Preferred Times: Any time    Reason for visit: Existing Patient    Comments:  follow up appt    Thank you.

## 2019-12-21 NOTE — TELEPHONE ENCOUNTER
Dr Silverman:  Forwarding this prescription request to you to please address as you are the patient's PCP.  Sincerely,   GAYLE Lambert MD

## 2019-12-31 NOTE — TELEPHONE ENCOUNTER
MA left a voicemail for pt to call back to let us know if he wants us to send his prescription of spironolactone to [facility] since it's on backorder at [facility]

## 2019-12-31 NOTE — TELEPHONE ENCOUNTER
MA spoke to the pt daughter. She said her dad is good on his aldactone right now and they don't mind waiting for it to come back ins tock.    ----- Message from Tesfaye Abreu MA sent at 12/31/2019 12:48 PM CST -----  Contact: pt daughter       ----- Message -----  From: Claude Brown  Sent: 12/31/2019  11:36 AM CST  To: [facility] Staff    Calling to speak with Michelle Bland    Call back: [phone]

## 2019-12-31 NOTE — TELEPHONE ENCOUNTER
Contacted patient to inform him that his medication has been approved and he can contact his pharmacy to receive it.

## 2020-01-01 ENCOUNTER — CLINICAL SUPPORT (OUTPATIENT)
Dept: CARDIOLOGY | Facility: HOSPITAL | Age: 85
End: 2020-01-01
Attending: INTERNAL MEDICINE
Payer: MEDICARE

## 2020-01-01 ENCOUNTER — TELEPHONE (OUTPATIENT)
Dept: UROLOGY | Facility: CLINIC | Age: 85
End: 2020-01-01

## 2020-01-01 ENCOUNTER — TELEPHONE (OUTPATIENT)
Dept: HEPATOLOGY | Facility: CLINIC | Age: 85
End: 2020-01-01

## 2020-01-01 ENCOUNTER — HOSPITAL ENCOUNTER (OUTPATIENT)
Dept: CARDIOLOGY | Facility: CLINIC | Age: 85
Discharge: HOME | End: 2020-08-20
Attending: INTERNAL MEDICINE
Payer: MEDICARE

## 2020-01-01 ENCOUNTER — PATIENT MESSAGE (OUTPATIENT)
Dept: INTERNAL MEDICINE | Facility: CLINIC | Age: 85
End: 2020-01-01

## 2020-01-01 ENCOUNTER — TELEPHONE (OUTPATIENT)
Dept: TRANSPLANT | Facility: CLINIC | Age: 85
End: 2020-01-01

## 2020-01-01 ENCOUNTER — LAB VISIT (OUTPATIENT)
Dept: LAB | Facility: OTHER | Age: 85
End: 2020-01-01
Payer: MEDICARE

## 2020-01-01 ENCOUNTER — OFFICE VISIT (OUTPATIENT)
Dept: GASTROENTEROLOGY | Facility: CLINIC | Age: 85
End: 2020-01-01
Payer: MEDICARE

## 2020-01-01 ENCOUNTER — PATIENT OUTREACH (OUTPATIENT)
Dept: ADMINISTRATIVE | Facility: OTHER | Age: 85
End: 2020-01-01

## 2020-01-01 ENCOUNTER — PATIENT MESSAGE (OUTPATIENT)
Dept: UROLOGY | Facility: CLINIC | Age: 85
End: 2020-01-01

## 2020-01-01 ENCOUNTER — LAB VISIT (OUTPATIENT)
Dept: LAB | Facility: HOSPITAL | Age: 85
End: 2020-01-01
Attending: INTERNAL MEDICINE
Payer: MEDICARE

## 2020-01-01 ENCOUNTER — CLINICAL SUPPORT (OUTPATIENT)
Dept: CARDIOLOGY | Facility: CLINIC | Age: 85
End: 2020-01-01
Payer: MEDICARE

## 2020-01-01 ENCOUNTER — TELEPHONE (OUTPATIENT)
Dept: INTERNAL MEDICINE | Facility: CLINIC | Age: 85
End: 2020-01-01

## 2020-01-01 ENCOUNTER — HOSPITAL ENCOUNTER (OUTPATIENT)
Dept: RADIOLOGY | Facility: HOSPITAL | Age: 85
Discharge: HOME | End: 2020-09-01
Attending: INTERNAL MEDICINE
Payer: MEDICARE

## 2020-01-01 ENCOUNTER — CLINICAL SUPPORT (OUTPATIENT)
Dept: CARDIOLOGY | Facility: CLINIC | Age: 85
End: 2020-01-01
Attending: INTERNAL MEDICINE
Payer: MEDICARE

## 2020-01-01 ENCOUNTER — OFFICE VISIT (OUTPATIENT)
Dept: URGENT CARE | Facility: CLINIC | Age: 85
End: 2020-01-01
Payer: MEDICARE

## 2020-01-01 ENCOUNTER — OFFICE VISIT (OUTPATIENT)
Dept: CARDIOLOGY | Facility: CLINIC | Age: 85
End: 2020-01-01
Payer: MEDICARE

## 2020-01-01 ENCOUNTER — OFFICE VISIT (OUTPATIENT)
Dept: HEPATOLOGY | Facility: CLINIC | Age: 85
End: 2020-01-01
Payer: MEDICARE

## 2020-01-01 ENCOUNTER — TELEPHONE (OUTPATIENT)
Dept: NEUROLOGY | Facility: CLINIC | Age: 85
End: 2020-01-01

## 2020-01-01 ENCOUNTER — DOCUMENTATION ONLY (OUTPATIENT)
Dept: INTERNAL MEDICINE | Facility: CLINIC | Age: 85
End: 2020-01-01

## 2020-01-01 ENCOUNTER — CLINICAL SUPPORT (OUTPATIENT)
Dept: UROLOGY | Facility: CLINIC | Age: 85
End: 2020-01-01
Payer: MEDICARE

## 2020-01-01 ENCOUNTER — PATIENT MESSAGE (OUTPATIENT)
Dept: HEPATOLOGY | Facility: CLINIC | Age: 85
End: 2020-01-01

## 2020-01-01 ENCOUNTER — TELEPHONE (OUTPATIENT)
Dept: ELECTROPHYSIOLOGY | Facility: CLINIC | Age: 85
End: 2020-01-01

## 2020-01-01 ENCOUNTER — OFFICE VISIT (OUTPATIENT)
Dept: ELECTROPHYSIOLOGY | Facility: CLINIC | Age: 85
End: 2020-01-01
Attending: INTERNAL MEDICINE
Payer: MEDICARE

## 2020-01-01 ENCOUNTER — OFFICE VISIT (OUTPATIENT)
Dept: HEMATOLOGY/ONCOLOGY | Facility: CLINIC | Age: 85
End: 2020-01-01
Payer: MEDICARE

## 2020-01-01 ENCOUNTER — OFFICE VISIT (OUTPATIENT)
Dept: OTOLARYNGOLOGY | Facility: CLINIC | Age: 85
End: 2020-01-01
Payer: MEDICARE

## 2020-01-01 ENCOUNTER — CLINICAL SUPPORT (OUTPATIENT)
Dept: CARDIOLOGY | Facility: CLINIC | Age: 85
End: 2020-01-01

## 2020-01-01 ENCOUNTER — OFFICE VISIT (OUTPATIENT)
Dept: INTERNAL MEDICINE | Facility: CLINIC | Age: 85
End: 2020-01-01
Payer: MEDICARE

## 2020-01-01 ENCOUNTER — PATIENT MESSAGE (OUTPATIENT)
Dept: OTHER | Facility: OTHER | Age: 85
End: 2020-01-01

## 2020-01-01 ENCOUNTER — OFFICE VISIT (OUTPATIENT)
Dept: UROLOGY | Facility: CLINIC | Age: 85
End: 2020-01-01
Payer: MEDICARE

## 2020-01-01 ENCOUNTER — PATIENT MESSAGE (OUTPATIENT)
Dept: OTOLARYNGOLOGY | Facility: CLINIC | Age: 85
End: 2020-01-01

## 2020-01-01 ENCOUNTER — OFFICE VISIT (OUTPATIENT)
Dept: DERMATOLOGY | Facility: CLINIC | Age: 85
End: 2020-01-01
Payer: MEDICARE

## 2020-01-01 VITALS
BODY MASS INDEX: 25.81 KG/M2 | RESPIRATION RATE: 20 BRPM | SYSTOLIC BLOOD PRESSURE: 81 MMHG | DIASTOLIC BLOOD PRESSURE: 56 MMHG | WEIGHT: 194.75 LBS | HEIGHT: 73 IN | HEART RATE: 84 BPM | TEMPERATURE: 98 F | OXYGEN SATURATION: 94 %

## 2020-01-01 VITALS
HEART RATE: 68 BPM | WEIGHT: 207 LBS | DIASTOLIC BLOOD PRESSURE: 56 MMHG | BODY MASS INDEX: 28.04 KG/M2 | SYSTOLIC BLOOD PRESSURE: 90 MMHG | HEIGHT: 72 IN

## 2020-01-01 VITALS
SYSTOLIC BLOOD PRESSURE: 116 MMHG | HEIGHT: 73 IN | BODY MASS INDEX: 29.74 KG/M2 | DIASTOLIC BLOOD PRESSURE: 73 MMHG | WEIGHT: 224.44 LBS | HEART RATE: 88 BPM

## 2020-01-01 VITALS
BODY MASS INDEX: 24.65 KG/M2 | SYSTOLIC BLOOD PRESSURE: 103 MMHG | HEIGHT: 73 IN | HEART RATE: 67 BPM | OXYGEN SATURATION: 98 % | SYSTOLIC BLOOD PRESSURE: 141 MMHG | HEIGHT: 73 IN | TEMPERATURE: 98 F | RESPIRATION RATE: 17 BRPM | WEIGHT: 186 LBS | DIASTOLIC BLOOD PRESSURE: 68 MMHG | DIASTOLIC BLOOD PRESSURE: 89 MMHG | BODY MASS INDEX: 24.65 KG/M2 | HEART RATE: 70 BPM | WEIGHT: 186 LBS

## 2020-01-01 VITALS — HEIGHT: 73 IN | WEIGHT: 205 LBS | BODY MASS INDEX: 27.17 KG/M2

## 2020-01-01 VITALS — BODY MASS INDEX: 27.14 KG/M2 | HEIGHT: 72 IN | WEIGHT: 200.38 LBS

## 2020-01-01 VITALS — SYSTOLIC BLOOD PRESSURE: 90 MMHG | DIASTOLIC BLOOD PRESSURE: 62 MMHG | HEART RATE: 62 BPM

## 2020-01-01 DIAGNOSIS — I48.91 ATRIAL FIBRILLATION, UNSPECIFIED TYPE: Primary | ICD-10-CM

## 2020-01-01 DIAGNOSIS — I10 ESSENTIAL HYPERTENSION: ICD-10-CM

## 2020-01-01 DIAGNOSIS — Z95.0 PACEMAKER: Primary | ICD-10-CM

## 2020-01-01 DIAGNOSIS — D53.9 MACROCYTIC ANEMIA: ICD-10-CM

## 2020-01-01 DIAGNOSIS — K76.9 LIVER DISEASE: ICD-10-CM

## 2020-01-01 DIAGNOSIS — Z71.89 COMPLEX CARE COORDINATION: ICD-10-CM

## 2020-01-01 DIAGNOSIS — Z95.0 PRESENCE OF CARDIAC PACEMAKER: ICD-10-CM

## 2020-01-01 DIAGNOSIS — I47.10 SVT (SUPRAVENTRICULAR TACHYCARDIA): ICD-10-CM

## 2020-01-01 DIAGNOSIS — R33.9 URINARY RETENTION: Primary | ICD-10-CM

## 2020-01-01 DIAGNOSIS — D64.9 ANEMIA, UNSPECIFIED TYPE: ICD-10-CM

## 2020-01-01 DIAGNOSIS — Z95.0 PACEMAKER: ICD-10-CM

## 2020-01-01 DIAGNOSIS — R19.5 FECAL OCCULT BLOOD TEST POSITIVE: Primary | ICD-10-CM

## 2020-01-01 DIAGNOSIS — R93.2 ABNORMAL GALLBLADDER ULTRASOUND: ICD-10-CM

## 2020-01-01 DIAGNOSIS — R79.89 ELEVATED LIVER FUNCTION TESTS: ICD-10-CM

## 2020-01-01 DIAGNOSIS — N13.8 BPH WITH URINARY OBSTRUCTION: Primary | ICD-10-CM

## 2020-01-01 DIAGNOSIS — N40.1 BENIGN PROSTATIC HYPERPLASIA WITH WEAK URINARY STREAM: Primary | ICD-10-CM

## 2020-01-01 DIAGNOSIS — I49.5 TACHY-BRADY SYNDROME: ICD-10-CM

## 2020-01-01 DIAGNOSIS — Z03.818 ENCOUNTER FOR OBSERVATION FOR SUSPECTED EXPOSURE TO OTHER BIOLOGICAL AGENTS RULED OUT: ICD-10-CM

## 2020-01-01 DIAGNOSIS — I49.5 TACHY-BRADY SYNDROME: Primary | ICD-10-CM

## 2020-01-01 DIAGNOSIS — Z87.19 HISTORY OF GASTROESOPHAGEAL REFLUX (GERD): ICD-10-CM

## 2020-01-01 DIAGNOSIS — L82.1 SEBORRHEIC KERATOSES: ICD-10-CM

## 2020-01-01 DIAGNOSIS — I63.9 COMPLETED STROKE: ICD-10-CM

## 2020-01-01 DIAGNOSIS — R19.5 FECAL OCCULT BLOOD TEST POSITIVE: ICD-10-CM

## 2020-01-01 DIAGNOSIS — S60.511A ABRASION OF PALM OF RIGHT HAND, INITIAL ENCOUNTER: Primary | ICD-10-CM

## 2020-01-01 DIAGNOSIS — I48.0 PAF (PAROXYSMAL ATRIAL FIBRILLATION): ICD-10-CM

## 2020-01-01 DIAGNOSIS — Z85.828 HISTORY OF NONMELANOMA SKIN CANCER: ICD-10-CM

## 2020-01-01 DIAGNOSIS — I48.0 PAROXYSMAL ATRIAL FIBRILLATION: ICD-10-CM

## 2020-01-01 DIAGNOSIS — E78.2 MIXED HYPERLIPIDEMIA: ICD-10-CM

## 2020-01-01 DIAGNOSIS — R39.12 BENIGN PROSTATIC HYPERPLASIA WITH WEAK URINARY STREAM: Primary | ICD-10-CM

## 2020-01-01 DIAGNOSIS — R00.1 BRADYCARDIA: ICD-10-CM

## 2020-01-01 DIAGNOSIS — L57.0 ACTINIC KERATOSES: ICD-10-CM

## 2020-01-01 DIAGNOSIS — I63.9 CEREBROVASCULAR ACCIDENT (CVA), UNSPECIFIED MECHANISM: ICD-10-CM

## 2020-01-01 DIAGNOSIS — D64.9 ANEMIA, UNSPECIFIED TYPE: Primary | ICD-10-CM

## 2020-01-01 DIAGNOSIS — K76.9 LIVER DISEASE: Primary | ICD-10-CM

## 2020-01-01 DIAGNOSIS — I10 HYPERTENSION, UNSPECIFIED TYPE: Primary | ICD-10-CM

## 2020-01-01 DIAGNOSIS — J30.9 ALLERGIC RHINITIS, UNSPECIFIED SEASONALITY, UNSPECIFIED TRIGGER: ICD-10-CM

## 2020-01-01 DIAGNOSIS — L30.9 DERMATITIS: Primary | ICD-10-CM

## 2020-01-01 DIAGNOSIS — J30.0 VASOMOTOR RHINITIS: Primary | ICD-10-CM

## 2020-01-01 DIAGNOSIS — R33.9 URINARY RETENTION: ICD-10-CM

## 2020-01-01 DIAGNOSIS — I49.3 PREMATURE VENTRICULAR CONTRACTIONS (PVCS) (VPCS): ICD-10-CM

## 2020-01-01 DIAGNOSIS — K59.09 CHRONIC CONSTIPATION: ICD-10-CM

## 2020-01-01 DIAGNOSIS — E55.9 VITAMIN D DEFICIENCY: ICD-10-CM

## 2020-01-01 DIAGNOSIS — Z79.01 LONG TERM CURRENT USE OF ANTICOAGULANT THERAPY: ICD-10-CM

## 2020-01-01 DIAGNOSIS — N40.1 BPH WITH URINARY OBSTRUCTION: Primary | ICD-10-CM

## 2020-01-01 LAB
ACANTHOCYTES BLD QL SMEAR: ABNORMAL
ALBUMIN SERPL BCP-MCNC: 2.7 G/DL (ref 3.5–5.2)
ALBUMIN SERPL ELPH-MCNC: 2.84 G/DL (ref 3.35–5.55)
ALP SERPL-CCNC: 136 U/L (ref 38–145)
ALPHA1 GLOB SERPL ELPH-MCNC: 0.14 G/DL (ref 0.17–0.41)
ALPHA2 GLOB SERPL ELPH-MCNC: 0.46 G/DL (ref 0.43–0.99)
ALT SERPL W/O P-5'-P-CCNC: 37 U/L (ref 0–50)
ANION GAP SERPL CALC-SCNC: 6 MMOL/L (ref 8–16)
ANION GAP SERPL CALC-SCNC: 8 MMOL/L (ref 8–16)
ANISOCYTOSIS BLD QL SMEAR: SLIGHT
AST SERPL-CCNC: 65 U/L (ref 17–59)
B-GLOBULIN SERPL ELPH-MCNC: 0.65 G/DL (ref 0.5–1.1)
BASOPHILS # BLD AUTO: 0.05 K/UL (ref 0–0.2)
BASOPHILS NFR BLD: 0.7 % (ref 0–1.9)
BILIRUB SERPL-MCNC: 2 MG/DL (ref 0.2–1.3)
BUN SERPL-MCNC: 23 MG/DL (ref 9–21)
BUN SERPL-MCNC: 32 MG/DL (ref 8–23)
BURR CELLS BLD QL SMEAR: ABNORMAL
CALCIUM SERPL-MCNC: 8.6 MG/DL (ref 8.7–10.5)
CALCIUM SERPL-MCNC: 8.8 MG/DL (ref 8.4–10.2)
CHLORIDE SERPL-SCNC: 100 MMOL/L (ref 95–110)
CHLORIDE SERPL-SCNC: 107 MMOL/L (ref 95–110)
CO2 SERPL-SCNC: 19 MMOL/L (ref 22–31)
CO2 SERPL-SCNC: 21 MMOL/L (ref 23–29)
CREAT SERPL-MCNC: 1.91 MG/DL (ref 0.5–1.4)
CREAT SERPL-MCNC: 2.3 MG/DL (ref 0.5–1.4)
DIFFERENTIAL METHOD: ABNORMAL
EOSINOPHIL # BLD AUTO: 0.2 K/UL (ref 0–0.5)
EOSINOPHIL NFR BLD: 3.2 % (ref 0–8)
ERYTHROCYTE [DISTWIDTH] IN BLOOD BY AUTOMATED COUNT: 14.6 % (ref 11.5–14.5)
EST. GFR  (AFRICAN AMERICAN): 27.9 ML/MIN/1.73 M^2
EST. GFR  (AFRICAN AMERICAN): 35 ML/MIN/1.73 M^2
EST. GFR  (NON AFRICAN AMERICAN): 24.1 ML/MIN/1.73 M^2
EST. GFR  (NON AFRICAN AMERICAN): 30 ML/MIN/1.73 M^2
FERRITIN SERPL-MCNC: 253 NG/ML (ref 18–464)
GAMMA GLOB SERPL ELPH-MCNC: 1.41 G/DL (ref 0.67–1.58)
GLUCOSE SERPL-MCNC: 92 MG/DL (ref 70–110)
GLUCOSE SERPL-MCNC: 99 MG/DL (ref 70–110)
HCT VFR BLD AUTO: 29.4 % (ref 40–54)
HGB BLD-MCNC: 10.3 G/DL (ref 14–18)
IMM GRANULOCYTES # BLD AUTO: 0.03 K/UL (ref 0–0.04)
IMM GRANULOCYTES NFR BLD AUTO: 0.4 % (ref 0–0.5)
INTERPRETATION SERPL IFE-IMP: NORMAL
IRON SATN MFR SERPL: 47 % (ref 20–50)
IRON SERPL-MCNC: 117 UG/DL (ref 45–160)
KAPPA LC SER QL IA: 9.44 MG/DL (ref 0.33–1.94)
KAPPA LC/LAMBDA SER IA: 1.08 (ref 0.26–1.65)
LAMBDA LC SER QL IA: 8.75 MG/DL (ref 0.57–2.63)
LYMPHOCYTES # BLD AUTO: 1.3 K/UL (ref 1–4.8)
LYMPHOCYTES NFR BLD: 17 % (ref 18–48)
MCH RBC QN AUTO: 36.3 PG (ref 27–31)
MCHC RBC AUTO-ENTMCNC: 35 G/DL (ref 32–36)
MCV RBC AUTO: 104 FL (ref 82–98)
MONOCYTES # BLD AUTO: 0.9 K/UL (ref 0.3–1)
MONOCYTES NFR BLD: 12.3 % (ref 4–15)
NEUTROPHILS # BLD AUTO: 5 K/UL (ref 1.8–7.7)
NEUTROPHILS NFR BLD: 66.4 % (ref 38–73)
NRBC BLD-RTO: 0 /100 WBC
PATH REV BLD -IMP: NORMAL
PATH REV BLD -IMP: NORMAL
PATHOLOGIST INTERPRETATION IFE: NORMAL
PATHOLOGIST INTERPRETATION SPE: NORMAL
PLATELET # BLD AUTO: 138 K/UL (ref 150–350)
PMV BLD AUTO: 10 FL (ref 9.2–12.9)
POTASSIUM SERPL-SCNC: 4.7 MMOL/L (ref 3.5–5.1)
POTASSIUM SERPL-SCNC: 5 MMOL/L (ref 3.5–5.1)
PROT SERPL-MCNC: 5.5 G/DL (ref 6–8.4)
PROT SERPL-MCNC: 5.8 G/DL (ref 6–8.4)
RBC # BLD AUTO: 2.84 M/UL (ref 4.6–6.2)
SARS-COV-2 RNA RESP QL NAA+PROBE: NOT DETECTED
SODIUM SERPL-SCNC: 125 MMOL/L (ref 136–145)
SODIUM SERPL-SCNC: 136 MMOL/L (ref 136–145)
TOTAL IRON BINDING CAPACITY: 250 UG/DL (ref 261–462)
WBC # BLD AUTO: 7.47 K/UL (ref 3.9–12.7)

## 2020-01-01 PROCEDURE — 99213 OFFICE O/P EST LOW 20 MIN: CPT | Mod: PBBFAC,PN | Performed by: INTERNAL MEDICINE

## 2020-01-01 PROCEDURE — 99214 PR OFFICE/OUTPT VISIT, EST, LEVL IV, 30-39 MIN: ICD-10-PCS | Mod: S$PBB,,, | Performed by: INTERNAL MEDICINE

## 2020-01-01 PROCEDURE — 99214 PR OFFICE/OUTPT VISIT, EST, LEVL IV, 30-39 MIN: ICD-10-PCS | Mod: S$PBB,,, | Performed by: NURSE PRACTITIONER

## 2020-01-01 PROCEDURE — 99999 PR PBB SHADOW E&M-EST. PATIENT-LVL III: ICD-10-PCS | Mod: PBBFAC,,, | Performed by: INTERNAL MEDICINE

## 2020-01-01 PROCEDURE — 93280 PM DEVICE PROGR EVAL DUAL: CPT

## 2020-01-01 PROCEDURE — 99999 PR PBB SHADOW E&M-EST. PATIENT-LVL III: CPT | Mod: PBBFAC,,, | Performed by: OTOLARYNGOLOGY

## 2020-01-01 PROCEDURE — 99204 PR OFFICE/OUTPT VISIT, NEW, LEVL IV, 45-59 MIN: ICD-10-PCS | Mod: S$PBB,25,, | Performed by: UROLOGY

## 2020-01-01 PROCEDURE — 99999 PR PBB SHADOW E&M-EST. PATIENT-LVL IV: CPT | Mod: PBBFAC,,, | Performed by: UROLOGY

## 2020-01-01 PROCEDURE — 99999 PR PBB SHADOW E&M-EST. PATIENT-LVL IV: ICD-10-PCS | Mod: PBBFAC,,, | Performed by: NURSE PRACTITIONER

## 2020-01-01 PROCEDURE — 36415 COLL VENOUS BLD VENIPUNCTURE: CPT | Mod: PO

## 2020-01-01 PROCEDURE — 86334 IMMUNOFIX E-PHORESIS SERUM: CPT | Mod: 26,,, | Performed by: PATHOLOGY

## 2020-01-01 PROCEDURE — 99999 PR PBB SHADOW E&M-EST. PATIENT-LVL III: CPT | Mod: PBBFAC,,, | Performed by: INTERNAL MEDICINE

## 2020-01-01 PROCEDURE — 99214 OFFICE O/P EST MOD 30 MIN: CPT | Mod: S$PBB,,, | Performed by: INTERNAL MEDICINE

## 2020-01-01 PROCEDURE — 82728 ASSAY OF FERRITIN: CPT | Mod: PN

## 2020-01-01 PROCEDURE — 99214 PR OFFICE/OUTPT VISIT, EST, LEVL IV, 30-39 MIN: ICD-10-PCS | Mod: 95,,, | Performed by: INTERNAL MEDICINE

## 2020-01-01 PROCEDURE — 51702 INSERT TEMP BLADDER CATH: CPT | Mod: S$PBB,,, | Performed by: UROLOGY

## 2020-01-01 PROCEDURE — 99214 OFFICE O/P EST MOD 30 MIN: CPT | Mod: 95,,, | Performed by: INTERNAL MEDICINE

## 2020-01-01 PROCEDURE — 99214 OFFICE O/P EST MOD 30 MIN: CPT | Mod: PBBFAC,PO | Performed by: NURSE PRACTITIONER

## 2020-01-01 PROCEDURE — 80053 COMPREHEN METABOLIC PANEL: CPT

## 2020-01-01 PROCEDURE — 86334 IMMUNOFIX E-PHORESIS SERUM: CPT

## 2020-01-01 PROCEDURE — 1159F PR MEDICATION LIST DOCUMENTED IN MEDICAL RECORD: ICD-10-PCS | Mod: ,,, | Performed by: INTERNAL MEDICINE

## 2020-01-01 PROCEDURE — 99213 OFFICE O/P EST LOW 20 MIN: CPT | Mod: PBBFAC,PO | Performed by: OTOLARYNGOLOGY

## 2020-01-01 PROCEDURE — 99214 OFFICE O/P EST MOD 30 MIN: CPT | Mod: PBBFAC,PO | Performed by: UROLOGY

## 2020-01-01 PROCEDURE — 51798 US URINE CAPACITY MEASURE: CPT | Mod: PBBFAC,PO | Performed by: UROLOGY

## 2020-01-01 PROCEDURE — 93010 RHYTHM STRIP: ICD-10-PCS | Mod: S$PBB,,, | Performed by: INTERNAL MEDICINE

## 2020-01-01 PROCEDURE — 99214 OFFICE O/P EST MOD 30 MIN: CPT | Mod: S$GLB,,, | Performed by: PHYSICIAN ASSISTANT

## 2020-01-01 PROCEDURE — 93010 ELECTROCARDIOGRAM REPORT: CPT | Mod: S$PBB,,, | Performed by: INTERNAL MEDICINE

## 2020-01-01 PROCEDURE — 99214 OFFICE O/P EST MOD 30 MIN: CPT | Mod: PBBFAC,25 | Performed by: NURSE PRACTITIONER

## 2020-01-01 PROCEDURE — 17000 DESTRUCT PREMALG LESION: CPT | Mod: S$PBB,,, | Performed by: DERMATOLOGY

## 2020-01-01 PROCEDURE — 99214 OFFICE O/P EST MOD 30 MIN: CPT | Mod: S$PBB,,, | Performed by: NURSE PRACTITIONER

## 2020-01-01 PROCEDURE — 99999 PR PBB SHADOW E&M-EST. PATIENT-LVL II: CPT | Mod: PBBFAC,,, | Performed by: DERMATOLOGY

## 2020-01-01 PROCEDURE — 99212 OFFICE O/P EST SF 10 MIN: CPT | Mod: PBBFAC | Performed by: INTERNAL MEDICINE

## 2020-01-01 PROCEDURE — 17000 PR DESTRUCTION(LASER SURGERY,CRYOSURGERY,CHEMOSURGERY),PREMALIGNANT LESIONS,FIRST LESION: ICD-10-PCS | Mod: S$PBB,,, | Performed by: DERMATOLOGY

## 2020-01-01 PROCEDURE — 99214 PR OFFICE/OUTPT VISIT, EST, LEVL IV, 30-39 MIN: ICD-10-PCS | Mod: S$GLB,,, | Performed by: PHYSICIAN ASSISTANT

## 2020-01-01 PROCEDURE — 85025 COMPLETE CBC W/AUTO DIFF WBC: CPT | Mod: PN

## 2020-01-01 PROCEDURE — 83540 ASSAY OF IRON: CPT | Mod: PN

## 2020-01-01 PROCEDURE — 93296 REM INTERROG EVL PM/IDS: CPT | Mod: PBBFAC,PO | Performed by: INTERNAL MEDICINE

## 2020-01-01 PROCEDURE — 99212 OFFICE O/P EST SF 10 MIN: CPT | Mod: PBBFAC,PO | Performed by: DERMATOLOGY

## 2020-01-01 PROCEDURE — 99999 PR PBB SHADOW E&M-EST. PATIENT-LVL III: ICD-10-PCS | Mod: PBBFAC,,, | Performed by: OTOLARYNGOLOGY

## 2020-01-01 PROCEDURE — 83520 IMMUNOASSAY QUANT NOS NONAB: CPT | Mod: 59

## 2020-01-01 PROCEDURE — 1126F PR PAIN SEVERITY QUANTIFIED, NO PAIN PRESENT: ICD-10-PCS | Mod: ,,, | Performed by: INTERNAL MEDICINE

## 2020-01-01 PROCEDURE — 84165 PROTEIN E-PHORESIS SERUM: CPT | Mod: 26,,, | Performed by: PATHOLOGY

## 2020-01-01 PROCEDURE — 51702 PR INSERTION OF TEMPORARY INDWELLING BLADDER CATHETER, SIMPLE: ICD-10-PCS | Mod: S$PBB,,, | Performed by: UROLOGY

## 2020-01-01 PROCEDURE — 99999 PR PBB SHADOW E&M-EST. PATIENT-LVL II: CPT | Mod: PBBFAC,,, | Performed by: INTERNAL MEDICINE

## 2020-01-01 PROCEDURE — 93294 CARDIAC DEVICE CHECK - REMOTE: ICD-10-PCS | Mod: ,,, | Performed by: INTERNAL MEDICINE

## 2020-01-01 PROCEDURE — 80053 COMPREHEN METABOLIC PANEL: CPT | Mod: PN

## 2020-01-01 PROCEDURE — 99999 PR PBB SHADOW E&M-EST. PATIENT-LVL II: ICD-10-PCS | Mod: PBBFAC,,, | Performed by: DERMATOLOGY

## 2020-01-01 PROCEDURE — 99213 PR OFFICE/OUTPT VISIT, EST, LEVL III, 20-29 MIN: ICD-10-PCS | Mod: S$PBB,,, | Performed by: OTOLARYNGOLOGY

## 2020-01-01 PROCEDURE — 84165 PROTEIN E-PHORESIS SERUM: CPT

## 2020-01-01 PROCEDURE — 99213 PR OFFICE/OUTPT VISIT, EST, LEVL III, 20-29 MIN: ICD-10-PCS | Mod: S$PBB,25,, | Performed by: DERMATOLOGY

## 2020-01-01 PROCEDURE — 82728 ASSAY OF FERRITIN: CPT

## 2020-01-01 PROCEDURE — 86334 PATHOLOGIST INTERPRETATION IFE: ICD-10-PCS | Mod: 26,,, | Performed by: PATHOLOGY

## 2020-01-01 PROCEDURE — 76700 US ABDOMEN COMPLETE: ICD-10-PCS | Mod: 26,,, | Performed by: RADIOLOGY

## 2020-01-01 PROCEDURE — 76700 US EXAM ABDOM COMPLETE: CPT | Mod: 26,,, | Performed by: RADIOLOGY

## 2020-01-01 PROCEDURE — 99999 PR PBB SHADOW E&M-EST. PATIENT-LVL IV: ICD-10-PCS | Mod: PBBFAC,,, | Performed by: UROLOGY

## 2020-01-01 PROCEDURE — 99999 PR PBB SHADOW E&M-EST. PATIENT-LVL IV: CPT | Mod: PBBFAC,,, | Performed by: NURSE PRACTITIONER

## 2020-01-01 PROCEDURE — 93294 REM INTERROG EVL PM/LDLS PM: CPT | Mod: ,,, | Performed by: INTERNAL MEDICINE

## 2020-01-01 PROCEDURE — 76700 US EXAM ABDOM COMPLETE: CPT | Mod: TC,PO

## 2020-01-01 PROCEDURE — 99213 OFFICE O/P EST LOW 20 MIN: CPT | Mod: S$PBB,,, | Performed by: INTERNAL MEDICINE

## 2020-01-01 PROCEDURE — 80048 BASIC METABOLIC PNL TOTAL CA: CPT

## 2020-01-01 PROCEDURE — 99213 OFFICE O/P EST LOW 20 MIN: CPT | Mod: S$PBB,,, | Performed by: OTOLARYNGOLOGY

## 2020-01-01 PROCEDURE — 83540 ASSAY OF IRON: CPT

## 2020-01-01 PROCEDURE — 85025 COMPLETE CBC W/AUTO DIFF WBC: CPT

## 2020-01-01 PROCEDURE — 1159F MED LIST DOCD IN RCRD: CPT | Mod: ,,, | Performed by: INTERNAL MEDICINE

## 2020-01-01 PROCEDURE — 51702 INSERT TEMP BLADDER CATH: CPT | Mod: PBBFAC,PO | Performed by: UROLOGY

## 2020-01-01 PROCEDURE — 99205 OFFICE O/P NEW HI 60 MIN: CPT | Mod: S$PBB,,, | Performed by: INTERNAL MEDICINE

## 2020-01-01 PROCEDURE — U0003 INFECTIOUS AGENT DETECTION BY NUCLEIC ACID (DNA OR RNA); SEVERE ACUTE RESPIRATORY SYNDROME CORONAVIRUS 2 (SARS-COV-2) (CORONAVIRUS DISEASE [COVID-19]), AMPLIFIED PROBE TECHNIQUE, MAKING USE OF HIGH THROUGHPUT TECHNOLOGIES AS DESCRIBED BY CMS-2020-01-R: HCPCS

## 2020-01-01 PROCEDURE — 99213 PR OFFICE/OUTPT VISIT, EST, LEVL III, 20-29 MIN: ICD-10-PCS | Mod: S$PBB,,, | Performed by: INTERNAL MEDICINE

## 2020-01-01 PROCEDURE — 99999 PR PBB SHADOW E&M-EST. PATIENT-LVL II: ICD-10-PCS | Mod: PBBFAC,,, | Performed by: INTERNAL MEDICINE

## 2020-01-01 PROCEDURE — 99213 OFFICE O/P EST LOW 20 MIN: CPT | Mod: PBBFAC,PO | Performed by: INTERNAL MEDICINE

## 2020-01-01 PROCEDURE — 99213 OFFICE O/P EST LOW 20 MIN: CPT | Mod: S$PBB,25,, | Performed by: DERMATOLOGY

## 2020-01-01 PROCEDURE — 93005 ELECTROCARDIOGRAM TRACING: CPT | Mod: PBBFAC,59 | Performed by: INTERNAL MEDICINE

## 2020-01-01 PROCEDURE — 99204 OFFICE O/P NEW MOD 45 MIN: CPT | Mod: S$PBB,25,, | Performed by: UROLOGY

## 2020-01-01 PROCEDURE — 99205 PR OFFICE/OUTPT VISIT, NEW, LEVL V, 60-74 MIN: ICD-10-PCS | Mod: S$PBB,,, | Performed by: INTERNAL MEDICINE

## 2020-01-01 PROCEDURE — 36415 COLL VENOUS BLD VENIPUNCTURE: CPT | Mod: PN

## 2020-01-01 PROCEDURE — 1126F AMNT PAIN NOTED NONE PRSNT: CPT | Mod: ,,, | Performed by: INTERNAL MEDICINE

## 2020-01-01 PROCEDURE — 84165 PATHOLOGIST INTERPRETATION SPE: ICD-10-PCS | Mod: 26,,, | Performed by: PATHOLOGY

## 2020-01-01 RX ORDER — BISACODYL 5 MG
5 TABLET, DELAYED RELEASE (ENTERIC COATED) ORAL DAILY PRN
COMMUNITY
End: 2020-01-01 | Stop reason: CLARIF

## 2020-01-01 RX ORDER — IPRATROPIUM BROMIDE 42 UG/1
2 SPRAY, METERED NASAL 2 TIMES DAILY
Qty: 15 ML | Refills: 6 | Status: SHIPPED | OUTPATIENT
Start: 2020-01-01 | End: 2020-01-01

## 2020-01-01 RX ORDER — RIVAROXABAN 20 MG/1
TABLET, FILM COATED ORAL
Qty: 90 TABLET | Refills: 3 | Status: SHIPPED | OUTPATIENT
Start: 2020-01-01 | End: 2020-01-01 | Stop reason: DRUGHIGH

## 2020-01-01 RX ORDER — TAMSULOSIN HYDROCHLORIDE 0.4 MG/1
0.4 CAPSULE ORAL DAILY
Qty: 30 CAPSULE | Refills: 11 | Status: SHIPPED | OUTPATIENT
Start: 2020-01-01

## 2020-01-01 RX ORDER — GUANFACINE 1 MG/1
TABLET ORAL
Qty: 90 TABLET | Refills: 1 | Status: SHIPPED | OUTPATIENT
Start: 2020-01-01 | End: 2020-01-01

## 2020-01-01 RX ORDER — SPIRONOLACTONE 50 MG/1
50 TABLET, FILM COATED ORAL DAILY
Qty: 90 TABLET | Refills: 3 | Status: SHIPPED | OUTPATIENT
Start: 2020-01-01 | End: 2020-01-01

## 2020-01-01 RX ORDER — DOCUSATE SODIUM 100 MG/1
100 CAPSULE, LIQUID FILLED ORAL 2 TIMES DAILY PRN
COMMUNITY
End: 2020-01-01 | Stop reason: CLARIF

## 2020-01-01 RX ORDER — ATORVASTATIN CALCIUM 40 MG/1
40 TABLET, FILM COATED ORAL DAILY
Qty: 90 TABLET | Refills: 0 | Status: SHIPPED | OUTPATIENT
Start: 2020-01-01 | End: 2020-01-01 | Stop reason: SDUPTHER

## 2020-01-01 RX ORDER — PANTOPRAZOLE SODIUM 40 MG/1
TABLET, DELAYED RELEASE ORAL
Qty: 90 TABLET | Refills: 1 | Status: SHIPPED | OUTPATIENT
Start: 2020-01-01 | End: 2020-01-01

## 2020-01-01 RX ORDER — LISINOPRIL 20 MG/1
20 TABLET ORAL 2 TIMES DAILY
Qty: 180 TABLET | Refills: 1 | Status: SHIPPED | OUTPATIENT
Start: 2020-01-01 | End: 2020-01-01

## 2020-01-01 RX ORDER — SPIRONOLACTONE 50 MG/1
50 TABLET, FILM COATED ORAL DAILY
Qty: 90 TABLET | Refills: 3 | Status: SHIPPED | OUTPATIENT
Start: 2020-01-01 | End: 2020-01-01 | Stop reason: SDUPTHER

## 2020-01-01 RX ORDER — LISINOPRIL 20 MG/1
20 TABLET ORAL DAILY
Qty: 180 TABLET | Refills: 1 | Status: ON HOLD | OUTPATIENT
Start: 2020-01-01 | End: 2020-01-01 | Stop reason: HOSPADM

## 2020-01-01 RX ORDER — POLYETHYLENE GLYCOL 3350 17 G/17G
17 POWDER, FOR SOLUTION ORAL DAILY
Qty: 510 G | Refills: 2 | Status: SHIPPED | OUTPATIENT
Start: 2020-01-01 | End: 2020-01-01 | Stop reason: CLARIF

## 2020-01-01 RX ORDER — SPIRONOLACTONE 50 MG/1
25 TABLET, FILM COATED ORAL DAILY
Qty: 90 TABLET | Refills: 3 | Status: ON HOLD | OUTPATIENT
Start: 2020-01-01 | End: 2020-01-01 | Stop reason: HOSPADM

## 2020-01-01 RX ORDER — MUPIROCIN 20 MG/G
OINTMENT TOPICAL
Qty: 22 G | Refills: 1 | Status: SHIPPED | OUTPATIENT
Start: 2020-01-01 | End: 2020-01-01 | Stop reason: CLARIF

## 2020-01-01 RX ORDER — FUROSEMIDE 20 MG/1
20 TABLET ORAL DAILY
Qty: 90 TABLET | Refills: 3 | Status: SHIPPED | OUTPATIENT
Start: 2020-01-01 | End: 2020-01-01 | Stop reason: SDUPTHER

## 2020-01-01 RX ORDER — FUROSEMIDE 20 MG/1
20 TABLET ORAL DAILY
Qty: 90 TABLET | Refills: 3 | Status: ON HOLD | OUTPATIENT
Start: 2020-01-01 | End: 2020-01-01 | Stop reason: HOSPADM

## 2020-01-01 RX ORDER — ATORVASTATIN CALCIUM 40 MG/1
TABLET, FILM COATED ORAL
Qty: 90 TABLET | Refills: 3 | OUTPATIENT
Start: 2020-01-01

## 2020-01-03 NOTE — PROGRESS NOTES
Subjective:    Patient ID:  Roby Rodriguez is a 89 y.o. male who presents for follow-up of SVT (Re-assess PAF) and PAF      HPI  89 yo male with HTN, PAC's, PVC's, tachy-kunal syndrome, PPM, CVA    Background:   H/o recurrent dizziness, and atrial arrhythmias.  Holter monitor obtained, revealed nsr with >1900 PVC's and > 4500 PAC's, with runs of nonsustained AT   Event monitor: one symptomatic transmission. Revealed nsr >> atrial fibrillation.  Dual chamber PPM implanted 7/22/13.   Subsequent device interrogation revealed atrial fibrillation with RVR. Toprol increased, coumadin initiated. Noted significant improvement in terms of dizziness and energy level.  Presented 3/31/17 with stroke.  Thought to be cardio-embolic in origin.  INR subtherapeutic at 1.7 upon admission.  Aspirin added.  We discontinued aspirin and coumadin, initiated Xarelto.    Update:  Feeling well overall.  Denies syncope, dizziness, shortness of breath.  Device interrogation reveals stable function of the leads, RA pacing 91.5% RV pacing <1 AF burden 0.1% longest episode was 4 minutes 2 seconds, frequent PVC's.     Review of Systems   Constitution: Negative. Negative for malaise/fatigue.   Cardiovascular: Negative for chest pain, dyspnea on exertion, irregular heartbeat, leg swelling, near-syncope, orthopnea, palpitations, paroxysmal nocturnal dyspnea and syncope.   Respiratory: Negative for cough and shortness of breath.    Neurological: Negative for dizziness, light-headedness and weakness.        Objective:    Physical Exam   Constitutional: He is oriented to person, place, and time. He appears well-developed and well-nourished.   Eyes: Conjunctivae are normal. No scleral icterus.   Neck: No JVD present. No tracheal deviation present.   Cardiovascular: Normal rate, regular rhythm and normal heart sounds. PMI is not displaced.   Pulmonary/Chest: Effort normal and breath sounds normal. No respiratory distress.   Abdominal: Soft. There is no  hepatosplenomegaly. There is no tenderness.   Musculoskeletal: He exhibits no edema (lower extremity) or tenderness.   Neurological: He is alert and oriented to person, place, and time.   Skin: Skin is warm and dry. No rash noted.   Psychiatric: He has a normal mood and affect. His behavior is normal.         Assessment:       1. Tachy-kunal syndrome    2. SVT (supraventricular tachycardia)    3. Premature ventricular contractions (PVCs) (VPCs)    4. Paroxysmal atrial fibrillation    5. Pacemaker    6. Essential hypertension    7. Bradycardia    8. Completed stroke         Plan:       Doing well.  Continue current therapy and medications.  F/u with monitoring as scheduled, with me in one year.

## 2020-01-08 NOTE — PROGRESS NOTES
"Subjective:       Patient ID: Roby Rodriguez is a 89 y.o. male.    Vitals:  height is 6' 1" (1.854 m) and weight is 84.4 kg (186 lb). His oral temperature is 98 °F (36.7 °C). His blood pressure is 141/89 (abnormal) and his pulse is 70. His respiration is 17 and oxygen saturation is 98%.     Chief Complaint: Laceration    Patient was walking the door when cut his right hand on wooden door frame.   Applying pressure unto site to stop bleeding    Laceration    The incident occurred less than 1 hour ago. The laceration is located on the right hand. The laceration mechanism was a metal edge. The pain is at a severity of 3/10. The pain is mild. Possible foreign bodies include metal and wood.       Constitution: Negative for fatigue.   HENT: Negative for facial swelling and facial trauma.    Neck: Negative for neck stiffness.   Cardiovascular: Negative for chest trauma.   Eyes: Negative for eye trauma, double vision and blurred vision.   Gastrointestinal: Negative for abdominal trauma, abdominal pain and rectal bleeding.   Genitourinary: Negative for hematuria, genital trauma and pelvic pain.   Musculoskeletal: Negative for trauma, abnormal ROM of joint and pain with walking.   Skin: Positive for wound and laceration. Negative for color change and abrasion.   Neurological: Negative for dizziness, history of vertigo, light-headedness, coordination disturbances, altered mental status and loss of consciousness.   Hematologic/Lymphatic: Negative for history of bleeding disorder.   Psychiatric/Behavioral: Negative for altered mental status.       Objective:      Physical Exam   Constitutional: He is oriented to person, place, and time. He appears well-developed and well-nourished. He is cooperative.  Non-toxic appearance. He does not appear ill. No distress.   HENT:   Head: Normocephalic and atraumatic.   Right Ear: Hearing, tympanic membrane, external ear and ear canal normal.   Left Ear: Hearing, tympanic membrane, external " ear and ear canal normal.   Nose: Nose normal. No mucosal edema, rhinorrhea or nasal deformity. No epistaxis. Right sinus exhibits no maxillary sinus tenderness and no frontal sinus tenderness. Left sinus exhibits no maxillary sinus tenderness and no frontal sinus tenderness.   Mouth/Throat: Uvula is midline, oropharynx is clear and moist and mucous membranes are normal. No trismus in the jaw. Normal dentition. No uvula swelling. No posterior oropharyngeal erythema.   Eyes: Conjunctivae and lids are normal. Right eye exhibits no discharge. Left eye exhibits no discharge. No scleral icterus.   Neck: Trachea normal, normal range of motion, full passive range of motion without pain and phonation normal. Neck supple.   Cardiovascular: Normal rate, regular rhythm, normal heart sounds, intact distal pulses and normal pulses.   Pulmonary/Chest: Effort normal and breath sounds normal. No respiratory distress.   Abdominal: Soft. Normal appearance and bowel sounds are normal. He exhibits no distension, no pulsatile midline mass and no mass. There is no tenderness.   Musculoskeletal: Normal range of motion. He exhibits no edema or deformity.        Right hand: He exhibits tenderness, laceration and swelling. He exhibits normal range of motion, no bony tenderness, normal two-point discrimination, normal capillary refill and no deformity. Normal sensation noted. Normal strength noted.        Hands:  Neurological: He is alert and oriented to person, place, and time. He exhibits normal muscle tone. Coordination normal.   Skin: Skin is warm, dry, intact, not diaphoretic and not pale. Lacerations - upper ext.:  right hand  Psychiatric: He has a normal mood and affect. His speech is normal and behavior is normal. Judgment and thought content normal. Cognition and memory are normal.   Nursing note and vitals reviewed.        Assessment:       1. Abrasion of palm of right hand, initial encounter        Plan:         Abrasion of palm of  right hand, initial encounter  -     mupirocin (BACTROBAN) 2 % ointment; Apply to affected area 3 times daily  Dispense: 22 g; Refill: 1    keep pressure dressing on today, keep clean and dry.

## 2020-01-08 NOTE — PATIENT INSTRUCTIONS
Self-Care for Cuts, Scrapes, and Burns  Cuts, scrapes, and burns are hard to avoid. Most minor injuries can be treated at home. A small wound may threaten your health if it causes severe blood loss or becomes infected. Call your doctor if a wound doesnt heal within a couple of weeks.  When should I call my healthcare provider?  Call your healthcare provider right away if:  · You cant stop the bleeding.  · The wound covers a large area, is deep, or you can see muscles, tendons or bones.  · Your ear or eye is injured or burned.  · The burn is larger than the size of your palm, or is on your neck, face, foot, groin, or your hand.  · A puncture wound is deep or wide, or was caused by a dirty or roman object.  · You have signs of infection: fever, pus, pain, or redness.  · It has been 10 years or more since your last tetanus shot.   Caring for cuts, scrapes, and puncture wounds  If youre caring for someone else, remember to protect yourself from illnesses carried in blood and body fluids. Use latex gloves or whatever else is available (a towel, perhaps) as a barrier between you and the blood.  Step 1. Control bleeding  · Apply direct pressure to a cut or scrape to stop bleeding.  · Allow a minor puncture wound to stop bleeding on its own, unless the bleeding is heavy. This may help clean out the wound.  Step 2. Clean the wound  · Kill germs and remove the dirt by washing the wound with warm water and soap.  · Soak a minor puncture wound in warm, sudsy water for several minutes. Repeat this at least 2 times every day.  Step 3. Cover the injury  · Hold the edges of a cut together with a butterfly bandage.  · Apply antibiotic ointment.  · For a cut or scrape, apply an adhesive bandage or clean gauze. Tape it in place.  · Cover a minor puncture with gauze to absorb drainage and let in air to help with healing.  Treating minor burns  · Cool the burn immediately. Otherwise, the skin continues to hold heat and will keep  burning. Use cloths soaked in cool water, place the burned area under a gentle stream of cool water, or submerge the burn in a full sink or bucket.  · Treat a minor burn like you treat a minor cut or scrape. Clean and cover it with a loose dressing.  · Do not put butter, oil, or ointment on a burn. This only seals in heat. After you cool the area, you can apply a moisturizer with Aloe Vera (with or without a numbing agent) to soothe the burn.  · Dont break blisters or pull off skin from a broken blister. This skin helps protect the healing skin underneath.  Date Last Reviewed: 12/1/2016 © 2000-2017 [employer]. [address] [place] [address], [place] [address]. All rights reserved. This information is not intended as a substitute for professional medical care. Always follow your healthcare professional's instructions.       If not allergic,take tylenol (acetominophen) for fever control, chills, or body aches every 4 hours. Do not exceed 4000 mg/ day.If not allergic, take Motrin (Ibuprofen) every 4 hours for fever, chills, pain or inflammation. Do not exceed 2400 mg/day. You can alternate taking tylenol and motrin.    If you were prescribed a narcotic medication, do not drive or operate heavy equipment or machinery while taking these medications.  You must understand that you've received an Urgent Care treatment only and that you may be released before all your medical problems are known or treated. You, the patient, will arrange for follow up care as instructed.  Follow up with your PCP or specialty clinic as directed in the next 1-2 weeks if not improved or as needed.  You can call [phone] to schedule an appointment with the appropriate provider.  If your condition worsens we recommend that you receive another evaluation at the emergency room immediately or contact your primary medical clinics after hours call service to discuss your concerns.    Please return here or go to the Emergency Department  for any concerns or worsening of condition.    If you have been referred to another provider and wish to call to check on the status of your referral, please call [facility] Scheduling at [phone]

## 2020-03-12 NOTE — PATIENT INSTRUCTIONS
Coconut oil into the ear canals as needed  Stop the Claritin  Use the nasal spray as below  Can use it 1-3 times daily as needed    Atrovent (Ipratropium bromide)     This is a nasal spray that primarily treats nasal drainage (rhinorrhea).    You may use this medication 2-3 times per day depending on leakage. This works fairly quickly after onset and can be used as needed (does not have to be used as a scheduled medication)    Helpful hints for maximizing medication into the nose  - Use the opposite hand to spray the nostril (example: right hand for left nostril). This will help avoid spraying the medication onto the septum (the area that divides the left and right nasal cavity.)  - Tilt the bottle so that it is facing at a slight angle up or straight back, but avoid pointing the bottle straight up while spraying.   - Sniff in while you are spraying.    Please use caution when spraying nose if on anticoagulants (coumadin, aspirin, plavix) as a common side effect is nasal dryness and possible nosebleed.

## 2020-03-12 NOTE — PROGRESS NOTES
Subjective:       Patient ID: Roby Rodriguez is a 89 y.o. male.    Chief Complaint: Nasal Congestion    Roby is here for rhinorrhea.   Length of symptoms:years, has been stable over this time.   Chronic thin rhinorrhea which worsens with meals and temperature change.   He does also have history of HL, L otalgia which is chronic. He does wear HA>   Therapies tried include: Claritin, Azelastine.     Social History     Tobacco Use   Smoking Status Never Smoker   Smokeless Tobacco Never Used     Social History     Substance and Sexual Activity   Alcohol Use Yes    Alcohol/week: 7.0 standard drinks    Types: 7 Glasses of wine per week          Review of Systems   Constitutional: Negative for activity change and appetite change.   Eyes: Negative for discharge.   Respiratory: Negative for difficulty breathing and wheezing   Cardiovascular: Negative for chest pain.   Gastrointestinal: Negative for abdominal distention and abdominal pain.   Endocrine: Negative for cold intolerance and heat intolerance.   Genitourinary: Negative for dysuria.   Musculoskeletal: Negative for gait problem and joint swelling.   Skin: Negative for color change and pallor.   Neurological: Negative for syncope and weakness.   Psychiatric/Behavioral: Negative for agitation and confusion.     Objective:        Constitutional:   He is oriented to person, place, and time. He appears well-developed and well-nourished. He appears alert. He is active. Normal speech.      Head:  Normocephalic and atraumatic. Head is without TMJ tenderness. No scars. Salivary glands normal.  Facial strength is normal.      Ears:    Right Ear: No drainage or swelling. No middle ear effusion. Decreased hearing is noted.   Left Ear: No drainage or swelling.  No middle ear effusion. Decreased hearing is noted.     Nose:  No mucosal edema, rhinorrhea or sinus tenderness. No turbinate hypertrophy.      Mouth/Throat  Oropharynx clear and moist without lesions or asymmetry,  normal uvula midline and mirror exam normal. Normal dentition. No uvula swelling, lacerations or trismus. No oropharyngeal exudate. Tonsillar erythema, tonsillar exudate.      Neck:  Full range of motion with neck supple and no adenopathy. Thyroid tenderness is present. No tracheal deviation, no edema, no erythema, normal range of motion, no stridor, no crepitus and no neck rigidity present. No thyroid mass present.     Cardiovascular:   Intact distal pulses and normal pulses.      Pulmonary/Chest:   Effort normal and breath sounds normal. No stridor.     Psychiatric:   His speech is normal and behavior is normal. His mood appears not anxious. His affect is not labile.     Neurological:   He is alert and oriented to person, place, and time. No sensory deficit.     Skin:   No abrasions, lacerations, lesions, or rashes. No abrasion and no bruising noted.         Tests / Results:  none    Assessment:       1. Vasomotor rhinitis          Plan:         Stop oral AH  Trial Atrovent  Fu prn

## 2020-05-21 NOTE — PROGRESS NOTES
Subjective:       Patient ID:  Roby Rodriguez is a 89 y.o. male who presents for   Chief Complaint   Patient presents with    Lesion     L toe, noticed on pedicure, no tx     HPI    Review of Systems     Objective:    Physical Exam   Constitutional: He appears well-developed and well-nourished.   Neurological: He is alert.   Psychiatric: He has a normal mood and affect.   Skin:   Areas Examined (abnormalities noted in diagram):   Scalp / Hair Palpated and Inspected  Head / Face Inspection Performed  Neck Inspection Performed  Chest / Axilla Inspection Performed  Abdomen Inspection Performed  Back Inspection Performed  RUE Inspected  LUE Inspection Performed                       Diagram Legend     Erythematous scaling macule/papule c/w actinic keratosis       Vascular papule c/w angioma      Pigmented verrucoid papule/plaque c/w seborrheic keratosis      Yellow umbilicated papule c/w sebaceous hyperplasia      Irregularly shaped tan macule c/w lentigo     1-2 mm smooth white papules consistent with Milia      Movable subcutaneous cyst with punctum c/w epidermal inclusion cyst      Subcutaneous movable cyst c/w pilar cyst      Firm pink to brown papule c/w dermatofibroma      Pedunculated fleshy papule(s) c/w skin tag(s)      Evenly pigmented macule c/w junctional nevus     Mildly variegated pigmented, slightly irregular-bordered macule c/w mildly atypical nevus      Flesh colored to evenly pigmented papule c/w intradermal nevus       Pink pearly papule/plaque c/w basal cell carcinoma      Erythematous hyperkeratotic cursted plaque c/w SCC      Surgical scar with no sign of skin cancer recurrence      Open and closed comedones      Inflammatory papules and pustules      Verrucoid papule consistent consistent with wart     Erythematous eczematous patches and plaques     Dystrophic onycholytic nail with subungual debris c/w onychomycosis     Umbilicated papule    Erythematous-base heme-crusted tan verrucoid plaque  consistent with inflamed seborrheic keratosis     Erythematous Silvery Scaling Plaque c/w Psoriasis     See annotation      Assessment / Plan:        Dermatitis     left ant ankle, erythema and ruptured blister  Poison ivy? (has in yard, non puritic though) vs edema bullae vs impetigo?  Will apply mupirocin (has at home) and aquaphore or zinc oxide paste     + pitting edema B ankles, on lasix    Actinic keratoses  Cryosurgery Procedure Note    Verbal consent from the patient is obtained and the patient is aware of the precancerous quality and need for treatment of these lesions. Liquid nitrogen cryosurgery is applied to the 1 actinic keratoses, as detailed in the physical exam, to produce a freeze injury. The patient is aware that blisters may form and is instructed on wound care with gentle cleansing and use of vaseline ointment to keep moist until healed. The patient is supplied a handout on cryosurgery and is instructed to call if lesions do not completely resolve.    Seborrheic keratoses  These are benign inherited growths without a malignant potential. Reassurance given to patient. No treatment is necessary.     History of nonmelanoma skin cancer  Area of previous NMSC (multiple) examined. Site well healed with no signs of recurrence.  Upper body skin examination performed today including at least 9 points as noted in physical examination. No lesions suspicious for malignancy noted.    Patient instructed in importance in daily sun protection of at least spf 30. Mineral sunscreen ingredients preferred (Zinc +/- Titanium).   Recommend Elta MD for daily use on face and neck.  Patient encouraged to wear hat for all outdoor exposure.   Also discussed sun avoidance and use of protective clothing.                 Follow up if symptoms worsen or fail to improve.

## 2020-05-21 NOTE — PROGRESS NOTES
Subjective:       Patient ID:  Roby Rodriguez is a 89 y.o. male who presents for   Chief Complaint   Patient presents with    Lesion     L toe, noticed on pedicure, no tx     Pt last seen 5-28-19 for AKs tx with cryo  *PACEMAKER     Here today for UBSE and lesion on L foot for few days.  Initially noticed during pedicure, some tenderness, no tx.     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      Current Outpatient Medications:     antipyrine-benzocaine (AURALGAN OR EQUIV) 5.4-1.4 % Drop, Place 3 drops into both ears 4 (four) times daily., Disp: 1 Bottle, Rfl: 3    atorvastatin (LIPITOR) 40 MG tablet, Take 1 tablet (40 mg total) by mouth once daily., Disp: 90 tablet, Rfl: 0    C,E,zinc,copper 11/tyogu3d/lut (OCUVITE ADULT 50 PLUS ORAL), Take by mouth once daily., Disp: , Rfl:     flu vacc [id number] up,PF (FLUZONE HIGH-DOSE 2019-20, PF,) 180 mcg/0.5 mL Syrg, Inject as directed., Disp: 0.5 mL, Rfl: 0    furosemide (LASIX) 20 MG tablet, Take 1 tablet (20 mg total) by mouth once daily., Disp: 90 tablet, Rfl: 3    guanFACINE (TENEX) 1 MG Tab, TAKE 1 TABLET EVERY EVENING, Disp: 90 tablet, Rfl: 1    ipratropium (ATROVENT) 42 mcg (0.06 %) nasal spray, 2 sprays by Nasal route 2 (two) times daily., Disp: 15 mL, Rfl: 6    lisinopriL (PRINIVIL,ZESTRIL) 20 MG tablet, Take 1 tablet (20 mg total) by mouth 2 (two) times daily., Disp: 180 tablet, Rfl: 1    loratadine (CLARITIN) 10 mg tablet, Take 10 mg by mouth once daily., Disp: , Rfl:     loratadine-pseudoephedrine  mg (CLARITIN-D 24-HOUR)  mg per 24 hr tablet, Take 1 tablet by mouth once daily., Disp: , Rfl:      magnesium oxide (MAG-OX) 400 mg (241.3 mg magnesium) tablet, Take 1 tablet (400 mg total) by mouth once daily., Disp: 30 tablet, Rfl: 2    metoprolol succinate (TOPROL-XL) 50 MG 24 hr tablet, Take 1 tablet (50 mg total) by mouth once daily., Disp: 90 tablet, Rfl: 3    mupirocin (BACTROBAN) 2 % ointment, Apply to affected area 3 times daily, Disp: 22 g, Rfl: 1    pantoprazole (PROTONIX) 40 MG tablet, TAKE 1 TABLET ONCE DAILY, Disp: 90 tablet, Rfl: 1    spironolactone (ALDACTONE) 50 MG tablet, Take 1 tablet (50 mg total) by mouth once daily., Disp: 90 tablet, Rfl: 3    vitamin D 1000 units Tab, Take 1,000 Units by mouth once daily., Disp: , Rfl:     XARELTO 20 mg Tab, TAKE 1 TABLET ONCE DAILY, Disp: 90 tablet, Rfl: 3        Review of Systems     Objective:    Physical Exam   Constitutional: He appears well-developed and well-nourished. No distress.   Neurological: He is alert and oriented to person, place, and time. He is not disoriented.   Psychiatric: He has a normal mood and affect.   Skin:   Areas Examined (abnormalities noted in diagram):   LLE Inspection Performed                  Diagram Legend     Erythematous scaling macule/papule c/w actinic keratosis       Vascular papule c/w angioma      Pigmented verrucoid papule/plaque c/w seborrheic keratosis      Yellow umbilicated papule c/w sebaceous hyperplasia      Irregularly shaped tan macule c/w lentigo     1-2 mm smooth white papules consistent with Milia      Movable subcutaneous cyst with punctum c/w epidermal inclusion cyst      Subcutaneous movable cyst c/w pilar cyst      Firm pink to brown papule c/w dermatofibroma      Pedunculated fleshy papule(s) c/w skin tag(s)      Evenly pigmented macule c/w junctional nevus     Mildly variegated pigmented, slightly irregular-bordered macule c/w mildly atypical nevus      Flesh colored to evenly pigmented papule c/w intradermal nevus       Pink pearly papule/plaque c/w basal cell carcinoma       Erythematous hyperkeratotic cursted plaque c/w SCC      Surgical scar with no sign of skin cancer recurrence      Open and closed comedones      Inflammatory papules and pustules      Verrucoid papule consistent consistent with wart     Erythematous eczematous patches and plaques     Dystrophic onycholytic nail with subungual debris c/w onychomycosis     Umbilicated papule    Erythematous-base heme-crusted tan verrucoid plaque consistent with inflamed seborrheic keratosis     Erythematous Silvery Scaling Plaque c/w Psoriasis     See annotation      Assessment / Plan:        Dermatitis    left ant ankle, erythema and ruptured blister  Poison ivy? (has in yard, non puritic though) vs edema bullae vs impetigo?  Will apply mupirocin (has at home) and aquaphore or zinc oxide paste    + pitting edema B ankles, on lasix         Follow up if symptoms worsen or fail to improve.

## 2020-05-21 NOTE — PATIENT INSTRUCTIONS
XEROSIS (DRY SKIN)    Xerosis is the term for dry skin.  We all have a natural oil coating over our skin produced by the skin oil glands.  If this oil is removed, the skin becomes dry which can lead to cracking, which can lead to inflammation. Xerosis is usually a long-term problem that recurs often, especially in the winter.    1. Cause     Long hot baths or showers can remove our natural oil and lead to xerosis.  One should never take more than one bath or shower a day and for no longer than ten minutes.   Use of harsh soaps such as Zest, Dial, and Ivory can worsen and cause xerosis.   Cold winter weather worsens xerosis because the amount of moisture contained in cold air is much less than the amount of moisture in warm air.    2. Treatment     Treatment is intended to restore the natural oil to your skin.  Keep the skin lubricated.     Do not take more than one bath or shower a day.  Use lukewarm water, not hot.  Hot water dries out the skin.     Use a gentle moisturizing soap such as Cetaphil soap, cerave gentle cleanser, Oil of Olay, Dove sensitive bar, Basis, Ivory moisture care, Restoraderm cleanser.     When toweling dry, dont rub.  Blot the skin so there is still some water left on the skin.  Immediately after toweling, you should apply a moisturizing cream from neck to toes such as Cerave cream, Cetaphil cream, Restoraderm or Eucerin Original Formula cream.  Alpha hydroxyacid lotions, i.e., AmLactin or Cerave SA, also work very well for preventing dry skin, but may burn when used on inflamed or reddened skin.     If you like to swim during the winter months, you should not use soap when getting out of the pool.  When you have finished swimming, rinse off the chlorine with cool to warm water.  If this will be the only shower of the day, then you may use Cetaphil or another mild soap to cleanse your skin.  After the shower, apply a moisturizing cream to all of the skin as above.    3. Additional  recommendation:      Use unscented hypoallergenic detergent for your clothes, avoid softener or dryer sheets unless they are unscented and hypoallergenic (all free and clear; downy free and gentle).    [facility]; [address] [place] [address] Tel: [phone] Fax: [phone]

## 2020-07-14 NOTE — TELEPHONE ENCOUNTER
MA called pt and got in touch with his daughter informing her Dr. Reich got pt  Refills sent to [facility].

## 2020-07-14 NOTE — TELEPHONE ENCOUNTER
----- Message from iLbby Arvizu sent at 7/14/2020  9:30 AM CDT -----  Regarding: Patient out of Meds  Marimar Villagran    Mr. Rodriguez is schedule a Virtual Visit w/Dr. Hung (last seen Dr Blanc ) 10/31/2019 . Patient daughter Erika would a call back after 3:00 pm today because patient is low on his medication and in need on refills.    Thanks,  Libby

## 2020-08-06 NOTE — TELEPHONE ENCOUNTER
Kaila,  Pls call Mr. Rodrgiuez and tell him his renal ultrasound shows nothing of concern and I have forwarded the report to Dr. Silverman.  Thanks,  RZ

## 2020-08-16 NOTE — PROGRESS NOTES
Requested updates from Care Everywhere.  Reviewed chart for overdue LYNDA topics.  Updated Health Maintenance.   Reconciled immunizations in LINKS.

## 2020-08-18 NOTE — PATIENT INSTRUCTIONS
Plan:    -  Complete ultrasound abd Now to see if spleen size increased.  US elastography showed only stage 2-3 fibrosis.     -  Elevated liver tests:  Based on imaging and prolonged history of elevated liver tests, US elastography performed and concern for F2-3 fibrosis.      -  Yet based on clinical symptoms, I presume advanced liver disease and after discussing risks and benefits of liver biopsy, decision to conservatively manage as presumed cirrhosis.      -  HCC screening and   -  updating vaccinations including hep A and B.    -  Liver function is good at this time and labs reviewed with patient and daughter during visit.     -  Continue low sodium diet    -  This morning, he was taken to the [facility] ER for dehydration. They gave him IV fluids and sent him home.  Hold lasix 20 mg/d and spironolactone 50 mg/d x 3 days.  Doesn't drink enough fluids.  Can drink icee.      Recommend hepatitis A and B vaccination.      RTC in 6 months

## 2020-08-18 NOTE — PROGRESS NOTES
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      HPI:  Roby Rodriguez is a 89 y.o. male that presents to hepatology clinic for follow-up of elevated liver tests.  He is accompanied by his daughter.      This morning, he was taken to the [facility] ER for dehydration. They gave him IV fluids and sent him home.  Hold lasix 20 mg/d and spironolactone 50 mg/d x 3 days.  Doesn't drink enough fluids.  Can drink icee.      The patient was last seen in clinic on 7/10/2019.  Previously referred for evaluation of elevated liver tests with concern for advanced liver disease.      The patient had no clinical history of chronic liver disease and only symptom was mild LE and hand swelling which has been present over the last 8-10 months.  He is noted to have elevated liver tests since .   Risk factors for VERMA present along with prior history of CVA for which he is on anticoagulation with Xarelto.    Serologic work-up only remarkable for low A1AT level.      Currently, daughter reported:     This morning, he was taken to the [facility] ER for dehydration. They gave him IV fluids and sent him home.  Hold lasix 20 mg/d and spironolactone 50 mg/d x 3 days.  Doesn't drink enough fluids.  Can drink icee.    Interval history:  Did go to ER today for dehydration as above.      No other interval hospitalizations.  Patient denies current symptoms of CLD.  Reports still has LE swelling despite diuretics.  May have to admit for IV lasix, and IV albumin.      Cirrhosis HCM:  Hepatitis A vaccination: needs vaccination  Hepatitis B vaccination: needs vaccination   HCC screenin2019  Variceal screening:  Pneumococcal vaccination  Influenza vaccination: 2019  Colonoscopy: no longer indicated      Patient Active Problem List   Diagnosis    BPH (benign prostatic hypertrophy)    Kidney  stones    Vertigo    Premature ventricular contractions (PVCs) (VPCs)    SVT (supraventricular tachycardia)    Premature atrial complexes    Paroxysmal atrial fibrillation    Bradycardia    Tachy-kunal syndrome    Pacemaker    Hearing loss, sensorineural    Long term current use of anticoagulant therapy    Essential hypertension    Mastoiditis of right side    Completed stroke    PUD (peptic ulcer disease)    Muscle weakness    Left arm pain       Past Medical History:   Diagnosis Date    *Atrial fibrillation     Allergy     Arthritis     Atrial fibrillation     Basal cell carcinoma 10/12/10    left earlobe    Basal cell carcinoma 7/23/2014    right neck    Cancer     Hx of Squamous Cell CA Scalp    Conductive hearing loss, bilateral     Chronic Eustachian Tube Dysfunction    Frequency of urination     Gastroenteritis     Headache(784.0)     Hypertension     Kidney stones     Otitis media     Squamous Cell Carcinoma 7/27/07    SCC ant scalp    Supraventricular tachycardia    CVA 3-4 years ago - reports mild L sided weakness       Past Surgical History:   Procedure Laterality Date    ADENOIDECTOMY      Cardiac pacemaker implantation      COLONOSCOPY  2004    reportedly normal in 2004    Duputyren Contracture Right Hand      INSERT / REPLACE / REMOVE PACEMAKER      Resection of Skin Cancer Scalp      TONSILLECTOMY      UPPER GASTROINTESTINAL ENDOSCOPY  07/31/2017    [place], [place]; sent for scanning: small hiatal hernia, diffuse erythema scattered all throughout the stomach, not actively bleeding, but suggestive of possible hemorrhagic gastritis, mild bile reflux noted; no erosions or ulcers seen; no biopsy report received.       Family History   Problem Relation Age of Onset    Melanoma Neg Hx     Psoriasis Neg Hx     Lupus Neg Hx     Eczema Neg Hx     Colon cancer Neg Hx     Colon polyps Neg Hx     Crohn's disease Neg Hx     Ulcerative colitis Neg Hx     Stomach cancer  Neg Hx     Esophageal cancer Neg Hx        Social History     Socioeconomic History    Marital status:      Spouse name: Not on file    Number of children: Not on file    Years of education: Not on file    Highest education level: Not on file   Occupational History    Occupation: Retired   Social Needs    Financial resource strain: Not on file    Food insecurity     Worry: Not on file     Inability: Not on file    Transportation needs     Medical: Not on file     Non-medical: Not on file   Tobacco Use    Smoking status: Never Smoker    Smokeless tobacco: Never Used   Substance and Sexual Activity    Alcohol use: Yes     Alcohol/week: 7.0 standard drinks     Types: 7 Glasses of wine per week    Drug use: No    Sexual activity: Not on file   Lifestyle    Physical activity     Days per week: Not on file     Minutes per session: Not on file    Stress: Not on file   Relationships    Social connections     Talks on phone: Not on file     Gets together: Not on file     Attends Church service: Not on file     Active member of club or organization: Not on file     Attends meetings of clubs or organizations: Not on file     Relationship status: Not on file   Other Topics Concern    Not on file   Social History Narrative    Not on file       Current Outpatient Medications   Medication Sig Dispense Refill    atorvastatin (LIPITOR) 40 MG tablet Take 1 tablet (40 mg total) by mouth once daily. (Patient taking differently: Take 40 mg by mouth every evening. ) 90 tablet 0    C,E,zinc,copper 11/glsnd7i/lut (OCUVITE ADULT 50 PLUS ORAL) Take 1 capsule by mouth every morning.       furosemide (LASIX) 20 MG tablet Take 1 tablet (20 mg total) by mouth once daily. (Patient taking differently: Take 20 mg by mouth every morning. ) 90 tablet 3    guanFACINE (TENEX) 1 MG Tab TAKE 1 TABLET EVERY EVENING (Patient taking differently: Take 1 mg by mouth every morning. ) 90 tablet 1    ipratropium (ATROVENT) 42  mcg (0.06 %) nasal spray USE 2 SPRAYS BY NASAL ROUTE 2 (TWO) TIMES DAILY. (Patient taking differently: 2 sprays by Nasal route every morning. ) 15 mL 6    lisinopriL (PRINIVIL,ZESTRIL) 20 MG tablet Take 1 tablet (20 mg total) by mouth 2 (two) times daily. 180 tablet 1    metoprolol succinate (TOPROL-XL) 50 MG 24 hr tablet TAKE 1 TABLET ONCE DAILY (Patient taking differently: Take 50 mg by mouth every evening. ) 90 tablet 3    pantoprazole (PROTONIX) 40 MG tablet TAKE 1 TABLET ONCE DAILY (Patient taking differently: Take 40 mg by mouth every evening. Do not crush, break, chew) 90 tablet 1    spironolactone (ALDACTONE) 50 MG tablet Take 1 tablet (50 mg total) by mouth once daily. 90 tablet 3    vitamin D 1000 units Tab Take 1,000 Units by mouth every morning.       XARELTO 20 mg Tab TAKE 1 TABLET ONCE DAILY (Patient taking differently: Take 20 mg by mouth every evening. ) 90 tablet 3     No current facility-administered medications for this visit.        Review of patient's allergies indicates:   Allergen Reactions    Amlodipine      Intolerant secondary to dizziness    Dyazide [triamterene-hydrochlorothiazid]      Significant weakness with his diuretic.       Review of Systems   Constitutional: Negative for chills, fever, malaise/fatigue and weight loss.   HENT: Positive for hearing loss.    Eyes: Negative.    Respiratory: Negative for cough and shortness of breath.    Cardiovascular: Positive for leg swelling. Negative for chest pain.   Gastrointestinal: Negative for abdominal pain, heartburn, nausea and vomiting.   Musculoskeletal: Negative for joint pain and myalgias.        Bilateral ankle swelling   Skin: Negative for itching and rash.   Neurological: Positive for focal weakness. Negative for dizziness and headaches.   Endo/Heme/Allergies: Bruises/bleeds easily.   Psychiatric/Behavioral: Negative for depression.       There were no vitals filed for this visit.    Physical Exam  Vitals signs reviewed.    Constitutional:       Appearance: He is well-developed.      Comments: Ambulating with cane   HENT:      Head: Normocephalic and atraumatic.      Mouth/Throat:      Pharynx: No oropharyngeal exudate.   Eyes:      General: No scleral icterus.     Conjunctiva/sclera: Conjunctivae normal.      Pupils: Pupils are equal, round, and reactive to light.   Neck:      Musculoskeletal: Normal range of motion and neck supple.      Thyroid: No thyromegaly.   Cardiovascular:      Rate and Rhythm: Normal rate and regular rhythm.      Heart sounds: Normal heart sounds. No murmur. No friction rub. No gallop.    Pulmonary:      Effort: Pulmonary effort is normal. No respiratory distress.      Breath sounds: Normal breath sounds. No wheezing or rales.   Abdominal:      General: Bowel sounds are normal. There is no distension.      Palpations: Abdomen is soft.      Tenderness: There is no abdominal tenderness.      Hernia: A hernia (R inguinal) is present.   Musculoskeletal: Normal range of motion.   Lymphadenopathy:      Cervical: No cervical adenopathy.   Skin:     General: Skin is warm and dry.      Findings: No erythema.   Neurological:      Mental Status: He is alert and oriented to person, place, and time.      Cranial Nerves: No cranial nerve deficit.      Comments: Mental status normal   Psychiatric:         Behavior: Behavior normal.         Lab Results   Component Value Date    ALT 33 08/18/2020    AST 52 08/18/2020     (H) 01/17/2019    ALKPHOS 113 08/18/2020    BILITOT 1.8 (H) 08/18/2020       Lab Results   Component Value Date    WBC 7.42 08/18/2020    HGB 12.0 (L) 08/18/2020    HCT 36.1 (L) 08/18/2020     (H) 08/18/2020     (L) 08/18/2020       Lab Results   Component Value Date     (L) 08/18/2020    K 3.9 08/18/2020     08/18/2020    CO2 19 (L) 08/18/2020    BUN 22 (H) 08/18/2020    CREATININE 1.78 (H) 08/18/2020    CALCIUM 9.0 08/18/2020    ANIONGAP 8 08/18/2020    ESTGFRAFRICA 38 (A)  08/18/2020    EGFRNONAA 33 (A) 08/18/2020       Imaging:   EMR and external imaging available reviewed   US with coarsened echotexture and heterogeneity concerning for fatty liver     Assessment:  89 y.o. male presenting with elevated liver tests of unclear etiology with concern for advanced fibrosis based on persistent elevation of liver tests and coarsened echotexture on imaging.  Serologic work-up with positive A1AT screening test and phenotype ordered.      Plan:    -  Complete ultrasound abd Now to see if spleen size increased.  US elastography showed only stage 2-3 fibrosis.     -  Elevated liver tests:  Based on imaging and prolonged history of elevated liver tests, US elastography performed and concern for F2-3 fibrosis.      -  Yet based on clinical symptoms, I presume advanced liver disease and after discussing risks and benefits of liver biopsy, decision to conservatively manage as presumed cirrhosis.      -  HCC screening and   -  updating vaccinations including hep A and B.    -  Liver function is good at this time and labs reviewed with patient and daughter during visit.     -  Continue low sodium diet    -  This morning, he was taken to the [facility] ER for dehydration. They gave him IV fluids and sent him home.  Hold lasix 20 mg/d and spironolactone 50 mg/d x 3 days.  Doesn't drink enough fluids.  Can drink icee.      Recommend hepatitis A and B vaccination.      RTC in 6 months

## 2020-08-18 NOTE — Clinical Note
Plan:    -  Complete ultrasound abd Now to see if spleen size increased.  US elastography showed only stage 2-3 fibrosis.     -  Elevated liver tests:  Based on imaging and prolonged history of elevated liver tests, US elastography performed and concern for F2-3 fibrosis.      -  Yet based on clinical symptoms, I presume advanced liver disease and after discussing risks and benefits of liver biopsy, decision to conservatively manage as presumed cirrhosis.      -  HCC screening and   -  updating vaccinations including hep A and B.    -  Liver function is good at this time and labs reviewed with patient and daughter during visit.     -  Continue low sodium diet    -  This morning, he was taken to the [facility] ER for dehydration. They gave him IV fluids and sent him home.  Hold lasix 20 mg/d and spironolactone 50 mg/d x 3 days.  Doesn't drink enough fluids.  Can drink icee.      Recommend hepatitis A and B vaccination.      RTC in 6 months

## 2020-08-18 NOTE — TELEPHONE ENCOUNTER
Spoke with pt's daughter to schedule hospital f/u with Vicky on 8/20.  The is aware that the appt is on [address].  ----- Message from Alice Taylor MA sent at 8/18/2020 11:24 AM CDT -----  Regarding: CAll Back/Appt  PT is currently in the hospital and was informed to scheduling an appt asap  Best call back #[phone]

## 2020-08-18 NOTE — TELEPHONE ENCOUNTER
----- Message from Kateryna Hung MD sent at 8/18/2020  4:45 PM CDT -----  Plan:-  Complete ultrasound abd Now to see if spleen size increased.  US elastography showed only stage 2-3 fibrosis. -  Elevated liver tests:  Based on imaging and prolonged history of elevated liver tests, US elastography performed and concern for F2-3 fibrosis.  -  Yet based on clinical symptoms, I presume advanced liver disease and after discussing risks and benefits of liver biopsy, decision to conservatively manage as presumed cirrhosis.  -  HCC screening and -  updating vaccinations including hep A and B.  -  Liver function is good at this time and labs reviewed with patient and daughter during visit. -  Continue low sodium diet-  This morning, he was taken to the [facility] ER for dehydration. They gave him IV fluids and sent him home.  Hold lasix 20 mg/d and spironolactone 50 mg/d x 3 days.  Doesn't drink enough fluids.  Can drink icee.  Recommend hepatitis A and B vaccination.  RTC in 6 months

## 2020-08-19 NOTE — PROGRESS NOTES
Mr. Rodriguez is a patient of Dr. Velazquez and was last seen in clinic 1/6/2020.      Subjective:   Patient ID:  Roby Rodriguez is a 89 y.o. male who presents for follow-up of Dizziness and Pacemaker Check  .     HPI:    Mr. Rodriguez is a 89 y.o. male with HTN, PAC's, PVC's, tachy-kunal syndrome, PPM, CVA here for hospital follow up.     Background:     H/o recurrent dizziness, and atrial arrhythmias.  Holter monitor obtained, revealed nsr with >1900 PVC's and > 4500 PAC's, with runs of nonsustained AT   Event monitor: one symptomatic transmission. Revealed nsr >> atrial fibrillation.  Dual chamber PPM implanted 7/22/13.   Subsequent device interrogation revealed atrial fibrillation with RVR. Toprol increased, coumadin initiated. Noted significant improvement in terms of dizziness and energy level.  Presented 3/31/17 with stroke.  Thought to be cardio-embolic in origin.  INR subtherapeutic at 1.7 upon admission.  Aspirin added.  We discontinued aspirin and coumadin, initiated Xarelto.    Update (08/20/2020):    8/18/2020: Patient is a 89-year-old white male presents with chief complaint generalized weakness near syncope after the use the toilet this a.m.. Patient complains that present.  He denies chest pain abdominal pain nausea vomiting.  Patient denies black, tarry stools. Patient given 1 L IV fluids with restoration of blood pressure.  Laboratory values show mild dehydration.  Patient instructed to cease hold Lasix and spironolactone x2 days then start a 3 day a week regimen.     Today he says he feels substantially better since hospitalization. He says that his symptoms improved once he had gotten IV fluids. Denies palpitations, CP, FABIAN. Denies bloody or tarry stools.    He is currently taking xarelto 20mg daily for stroke prophylaxis and denies significant bleeding episodes. He is currently being treated with metoprolol succinate 50mg daily for HR control.  Kidney function is low, with a creatinine of 1.7 on  8/18/2020. This is not his baseline.     Device Interrogation (8/20/2020) reveals an intrinsic SB with PACs with stable lead and device function. SVTx3, longest 4 min, max CL 188bpm, 8/18/2020 between 1342-9337. He paces 75.5% in the RA and 0.2% in the RV. Estimated battery longevity 3 years.     I have personally reviewed the patient's EKG today, which shows APVS at 68bpm. QRS is 94. QTc is 457.    Recent Cardiac Tests:    2D Echo (4/1/2017):  CONCLUSIONS     1 - Normal left ventricular systolic function (EF 55-60%).     2 - Normal left ventricular diastolic function.     3 - Normal right ventricular systolic function .     4 - The estimated PA systolic pressure is greater than 20 mmHg.     5 - Trivial tricuspid regurgitation.     Current Outpatient Medications   Medication Sig    atorvastatin (LIPITOR) 40 MG tablet Take 1 tablet (40 mg total) by mouth once daily. (Patient taking differently: Take 40 mg by mouth every evening. )    C,E,zinc,copper 11/vrgze7b/lut (OCUVITE ADULT 50 PLUS ORAL) Take 1 capsule by mouth every morning.     guanFACINE (TENEX) 1 MG Tab TAKE 1 TABLET EVERY EVENING (Patient taking differently: Take 1 mg by mouth every morning. )    ipratropium (ATROVENT) 42 mcg (0.06 %) nasal spray USE 2 SPRAYS BY NASAL ROUTE 2 (TWO) TIMES DAILY. (Patient taking differently: 2 sprays by Nasal route every morning. )    lisinopriL (PRINIVIL,ZESTRIL) 20 MG tablet Take 1 tablet (20 mg total) by mouth 2 (two) times daily.    metoprolol succinate (TOPROL-XL) 50 MG 24 hr tablet TAKE 1 TABLET ONCE DAILY (Patient taking differently: Take 50 mg by mouth every evening. )    pantoprazole (PROTONIX) 40 MG tablet TAKE 1 TABLET ONCE DAILY (Patient taking differently: Take 40 mg by mouth every evening. Do not crush, break, chew)    vitamin D 1000 units Tab Take 1,000 Units by mouth every morning.     XARELTO 20 mg Tab TAKE 1 TABLET ONCE DAILY (Patient taking differently: Take 20 mg by mouth every evening. )     furosemide (LASIX) 20 MG tablet Take 1 tablet (20 mg total) by mouth once daily. (Patient not taking: Reported on 8/20/2020)    spironolactone (ALDACTONE) 50 MG tablet Take 1 tablet (50 mg total) by mouth once daily. (Patient not taking: Reported on 8/20/2020)     No current facility-administered medications for this visit.        Review of Systems   Constitution: Negative for malaise/fatigue.   Cardiovascular: Negative for chest pain, dyspnea on exertion, irregular heartbeat, leg swelling and palpitations.   Respiratory: Negative for shortness of breath.    Hematologic/Lymphatic: Negative for bleeding problem.   Skin: Negative for rash.   Musculoskeletal: Negative for myalgias.   Gastrointestinal: Negative for hematemesis, hematochezia and nausea.   Genitourinary: Negative for hematuria.   Neurological: Positive for light-headedness.   Psychiatric/Behavioral: Negative for altered mental status.   Allergic/Immunologic: Negative for persistent infections.     Objective:        BP (!) 90/56   Pulse 68   Ht 6' (1.829 m)   Wt 93.9 kg (207 lb 0.2 oz)   BMI 28.08 kg/m²     Physical Exam   Constitutional: He is oriented to person, place, and time. He appears well-developed and well-nourished.   HENT:   Head: Normocephalic.   Nose: Nose normal.   Eyes: Pupils are equal, round, and reactive to light.   Cardiovascular: Normal rate, regular rhythm, S1 normal and S2 normal.   No murmur heard.  Pulses:       Radial pulses are 2+ on the right side and 2+ on the left side.   Pulmonary/Chest: Breath sounds normal. No respiratory distress.   Device to LUCW.   Abdominal: Normal appearance.   Musculoskeletal: Normal range of motion.         General: No edema.   Neurological: He is alert and oriented to person, place, and time.   Skin: Skin is warm and dry. No erythema.   Psychiatric: He has a normal mood and affect. His speech is normal and behavior is normal.   Nursing note and vitals reviewed.    Lab Results   Component Value  Date     (L) 08/18/2020    K 3.9 08/18/2020    MG 1.8 01/17/2019    BUN 22 (H) 08/18/2020    CREATININE 1.78 (H) 08/18/2020    ALT 33 08/18/2020    AST 52 08/18/2020    HGB 12.0 (L) 08/18/2020    HCT 36.1 (L) 08/18/2020    TSH 3.512 10/24/2019    LDLCALC 77.8 10/24/2019       Recent Labs   Lab 05/28/19  1614 07/10/19  1458   INR 1.6 H 1.6 H       Assessment:     1. Pacemaker    2. Paroxysmal atrial fibrillation    3. Essential hypertension    4. SVT (supraventricular tachycardia)    5. Tachy-kunal syndrome    6. Long term current use of anticoagulant therapy    7. Fecal occult blood test positive      Plan:     In summary, Mr. Rodriguez is a 89 y.o. male with HTN, PAC's, PVC's, tachy-kunal syndrome, PPM, CVA here for hospital follow up.   He is s/p hospital visit for near-syncopal episode after using the bathroom. In ED, labwork indicated dehydration and patient's symptoms improved after IV fluids. SVTx3 identified on device (max 4 min) that coincides with AM of event. SVT possibly consequence of dehydration, etc. Otherwise device is stable. Minimal RV pacing. No recent AF. On xarelto. Creatinine elevated in the context of dehydration. Will repeat BMP in 2 wks to ensure he does not need to reduce his xarelto dose.  Fecal stool positive for occult blood. H&H decreased slightly from previous. Will refer to GI. He remains hypotensive. Reduce lisinopril today. Will consider increasing metoprolol if SVT episodes recur.    Reduce lisinopril to once daily.  GI referral  BMP in 2 weeks.   Continue other medications and device checks  RTC 3 mo, sooner if needed.    *A copy of this note has been sent to Dr. Velazquez*    Follow up in about 3 months (around 11/20/2020).    ------------------------------------------------------------------    BRANDI Bernardo, NP-C  Cardiac Electrophysiology

## 2020-08-20 NOTE — LETTER
August 20, 2020      Vincenzo Velazquez MD  [address]  [facility] [address]           [id number]  [address]  [facility] [address]  Phone: [phone]  Fax: [phone]          Patient: Roby Rodriguez   MR Number: [id number]   YOB: 1930   Date of Visit: 8/20/2020       Dear Dr. Vincenzo Velazquez:    Thank you for referring Roby Rodriguez to me for evaluation. Attached you will find relevant portions of my assessment and plan of care.    If you have questions, please do not hesitate to call me. I look forward to following Roby Rodriguez along with you.    Sincerely,    Bruna Mcmanus NP    Enclosure  CC:  No Recipients    If you would like to receive this communication electronically, please contact [email or web address] or [phone] to request more information on [facility] Link access.    For providers and/or their staff who would like to refer a patient to [facility], please contact us through our one-stop-shop provider referral line, [facility], at [phone].    If you feel you have received this communication in error or would no longer like to receive these types of communications, please e-mail [email or web address]

## 2020-08-27 NOTE — PROGRESS NOTES
Subjective:       Patient ID: Roby Rodriguez is a 89 y.o. male Body mass index is 27.05 kg/m².    Chief Complaint: Hospital Follow Up (Follow up test)    This patient is established Dr Hung & myself.    Patient is here with his daughter, whom assisted with history. They are here for follow-up from ER visit for dehydration. Daughter reports patient saw cardiology team and was told he needs to follow-up with GI for positive fecal occult stool test done in the ER on 8/18/2020.    GI Problem  Primary symptoms do not include fever, weight loss, fatigue, abdominal pain, nausea, vomiting, diarrhea, melena, hematemesis, hematochezia or dysuria.   The illness is also significant for constipation (chronic for several years; last bowel movement was today, prior was 4 days prior, bowel movements 2-3 times a week; colace prn and dulcolax once helped). The illness does not include chills, dysphagia or odynophagia. Significant associated medical issues include GERD (controlled on protonix 40 mg once daily; PAST TREATMENT: carafate) and liver disease (seeing Dr. Hung and he is scheduled to have abdominal ultrasound soon). Associated medical issues do not include inflammatory bowel disease or PUD.     Review of Systems   Constitutional: Negative for appetite change, chills, fatigue, fever and weight loss.   HENT: Negative for sore throat and trouble swallowing.    Respiratory: Negative for cough, choking and shortness of breath.    Cardiovascular: Negative for chest pain.   Gastrointestinal: Positive for blood in stool (denies any visible blood but occult positive stool) and constipation (chronic for several years; last bowel movement was today, prior was 4 days prior, bowel movements 2-3 times a week; colace prn and dulcolax once helped). Negative for abdominal pain, anal bleeding, diarrhea, dysphagia, hematemesis, hematochezia, melena, nausea, rectal pain and vomiting.        Drinks two 16 oz bottles of water a day    Genitourinary: Negative for difficulty urinating, dysuria, flank pain, frequency and urgency.   Neurological: Negative for weakness.       Past Medical History:   Diagnosis Date    *Atrial fibrillation     Allergy     Arthritis     Atrial fibrillation     Basal cell carcinoma 10/12/10    left earlobe    Basal cell carcinoma 7/23/2014    right neck    Cancer     Hx of Squamous Cell CA Scalp    Conductive hearing loss, bilateral     Chronic Eustachian Tube Dysfunction    Frequency of urination     Gastroenteritis     Headache(784.0)     Hypertension     Kidney stones     Otitis media     Squamous Cell Carcinoma 7/27/07    SCC ant scalp    Supraventricular tachycardia      Past Surgical History:   Procedure Laterality Date    ADENOIDECTOMY      Cardiac pacemaker implantation      COLONOSCOPY  2004    reportedly normal in 2004    Duputyren Contracture Right Hand      INSERT / REPLACE / REMOVE PACEMAKER      Resection of Skin Cancer Scalp      TONSILLECTOMY      UPPER GASTROINTESTINAL ENDOSCOPY  07/31/2017    [place], [place]; sent for scanning: small hiatal hernia, diffuse erythema scattered all throughout the stomach, not actively bleeding, but suggestive of possible hemorrhagic gastritis, mild bile reflux noted; no erosions or ulcers seen; no biopsy report received.     Family History   Problem Relation Age of Onset    Melanoma Neg Hx     Psoriasis Neg Hx     Lupus Neg Hx     Eczema Neg Hx     Colon cancer Neg Hx     Colon polyps Neg Hx     Crohn's disease Neg Hx     Ulcerative colitis Neg Hx     Stomach cancer Neg Hx     Esophageal cancer Neg Hx      Wt Readings from Last 10 Encounters:   08/27/20 93 kg (205 lb 0.4 oz)   08/23/20 93.9 kg (207 lb)   08/20/20 93.9 kg (207 lb 0.2 oz)   08/18/20 92.3 kg (203 lb 7.8 oz)   03/12/20 90.9 kg (200 lb 6.4 oz)   01/08/20 84.4 kg (186 lb)   01/06/20 84.4 kg (186 lb)   11/14/19 86.6 kg (191 lb)   10/31/19 89 kg (196 lb 3.4 oz)   11/01/19 86.6 kg  (191 lb)     Lab Results   Component Value Date    WBC 7.42 08/18/2020    HGB 12.0 (L) 08/18/2020    HCT 36.1 (L) 08/18/2020     (H) 08/18/2020     (L) 08/18/2020     CMP  Sodium   Date Value Ref Range Status   08/18/2020 133 (L) 136 - 145 mmol/L Final     Potassium   Date Value Ref Range Status   08/18/2020 3.9 3.5 - 5.1 mmol/L Final     Chloride   Date Value Ref Range Status   08/18/2020 106 95 - 110 mmol/L Final     CO2   Date Value Ref Range Status   08/18/2020 19 (L) 22 - 31 mmol/L Final     Glucose   Date Value Ref Range Status   08/18/2020 123 (H) 70 - 110 mg/dL Final     Comment:     The ADA recommends the following guidelines for fasting glucose:  Normal:       less than 100 mg/dL  Prediabetes:  100 mg/dL to 125 mg/dL  Diabetes:     126 mg/dL or higher       BUN, Bld   Date Value Ref Range Status   08/18/2020 22 (H) 9 - 21 mg/dL Final     Creatinine   Date Value Ref Range Status   08/18/2020 1.78 (H) 0.50 - 1.40 mg/dL Final     Calcium   Date Value Ref Range Status   08/18/2020 9.0 8.4 - 10.2 mg/dL Final     Total Protein   Date Value Ref Range Status   08/18/2020 6.0 6.0 - 8.4 g/dL Final     Albumin   Date Value Ref Range Status   08/18/2020 2.9 (L) 3.5 - 5.2 g/dL Final     Total Bilirubin   Date Value Ref Range Status   08/18/2020 1.8 (H) 0.2 - 1.3 mg/dL Final     Alkaline Phosphatase   Date Value Ref Range Status   08/18/2020 113 38 - 145 U/L Final     AST   Date Value Ref Range Status   08/18/2020 52 17 - 59 U/L Final     ALT   Date Value Ref Range Status   08/18/2020 33 0 - 50 U/L Final     Anion Gap   Date Value Ref Range Status   08/18/2020 8 8 - 16 mmol/L Final     eGFR if    Date Value Ref Range Status   08/18/2020 38 (A) >60 mL/min/1.73 m^2 Final     eGFR if non    Date Value Ref Range Status   08/18/2020 33 (A) >60 mL/min/1.73 m^2 Final     Comment:     Calculation used to obtain the estimated glomerular filtration  rate (eGFR) is the CKD-EPI  equation.        Lab Results   Component Value Date    TSH 3.512 10/24/2019     Lab Results   Component Value Date    OCCULTBLOOD Positive (A) 08/18/2020     Reviewed prior medical records including radiology report of 12/16/2019 liver ultrasound; 4/23/2019 limited abdominal ultrasound and abdominal x-ray; 8/18/2020 visit note with Dr. Hung; & endoscopy history (see surgical history).    Objective:      Physical Exam  Vitals signs and nursing note reviewed.   Constitutional:       General: He is not in acute distress.     Appearance: Normal appearance. He is well-developed. He is not diaphoretic.      Comments: Patient uses cane for assistance with walking.   HENT:      Mouth/Throat:      Comments: Patient is wearing a face mask, which covers patient's mouth and nose, due to COVID 19 concerns.  Eyes:      General: No scleral icterus.     Conjunctiva/sclera: Conjunctivae normal.      Pupils: Pupils are equal, round, and reactive to light.   Neck:      Musculoskeletal: Neck supple.      Trachea: No tracheal deviation.   Pulmonary:      Effort: Pulmonary effort is normal. No respiratory distress.      Breath sounds: Normal breath sounds. No wheezing.   Abdominal:      General: Bowel sounds are normal. There is no distension.      Palpations: Abdomen is soft. Abdomen is not rigid. There is no mass.      Tenderness: There is no abdominal tenderness. There is no guarding or rebound. Negative signs include Dhaliwal's sign and McBurney's sign.      Comments: Patient declined rectal exam.   Lymphadenopathy:      Cervical: No cervical adenopathy.   Skin:     General: Skin is warm and dry.      Coloration: Skin is not pale.      Findings: No erythema or rash.      Comments: Non-jaundiced   Neurological:      Mental Status: He is alert and oriented to person, place, and time.   Psychiatric:         Behavior: Behavior normal.         Assessment:       1. Fecal occult blood test positive    2. Chronic constipation    3. History of  gastroesophageal reflux (GERD)    4. Liver disease    5. Macrocytic anemia    6. Abnormal gallbladder ultrasound        Plan:       Fecal occult blood test positive  -     Ambulatory referral/consult to Hematology / Oncology; Future; Expected date: 09/04/2020  - discussed about different etiologies that can cause blood in stool, such as but not limited to diverticulosis, polyps, ulcers of the intestines, colon mass, colon inflammation or infection, anal fissure or hemorrhoids.   - You may resume normal activity as long as you feel well.  - Avoid NSAIDs such as ibuprofen (Advil, Motrin) and naproxen (Aleve and Naprosyn).  - Avoid alcohol.  - discussed about Colonoscopy, including the risks and benefits of colonoscopy, patient and patient's daughter verbalized understanding but they both declined colonoscopy.    Chronic constipation  -  INCREASE TO   polyethylene glycol (GLYCOLAX) 17 gram/dose powder; Take 17 g by mouth once daily. Mix with 8 oz of water/liquid.  Dispense: 510 g; Refill: 2  - discussed about Colonoscopy, including the risks and benefits of colonoscopy, patient and patient's daughter verbalized understanding but they both declined colonoscopy.  Recommend daily exercise as tolerated, adequate water intake (six 8-oz glasses of water daily), and high fiber diet. OTC fiber supplements are recommended if diet does not reach daily fiber goal (20-30 grams daily), such as Metamucil, Citrucel, or FiberCon (take as directed, separate from other oral medications by >2 hours).  - CONTINUE taking an OTC stool softener such as Colace as directed to avoid hard stools and straining with bowel movements PRN  - If no improvement with above recommendations, try intermittently dosed Dulcolax OTC as directed (every 3-4  days) PRN to facilitate bowel movements  -If still no improvement with these measures, call/follow-up    History of gastroesophageal reflux (GERD)  - CONTINUE PROTONIX 40 MG ONCE DAILY AS DIRECTED  - avoid  use of NSAIDs- since they can cause GI upset, bleeding and/or ulcers.    Liver disease  Recommend follow-up with DR. MERAZ, HEPATOLOGY, for continued evaluation and management.    Macrocytic anemia  -     Ambulatory referral/consult to Hematology / Oncology; Future; Expected date: 09/04/2020  Recommend follow-up with Primary Care Provider & HEMATOLOGY for continued evaluation and management.    Abnormal gallbladder ultrasound  - recommend low fat diet  - discussed diagnosis & that it can cause symptoms in some patients, while other patients with it can be asymptomatic; recommend seeing general surgery for further evaluation and management, patient & patient's daughter both verbalized understanding    Follow up in about 1 month (around 9/27/2020), or if symptoms worsen or fail to improve.      If no improvement in symptoms or symptoms worsen, call/follow-up at clinic or go to ER.

## 2020-08-27 NOTE — LETTER
August 28, 2020      Bruna Mcmanus, NP  [address]  [facility] [address]           [facility] [address] [place] [address]  Phone: [phone]          Patient: Roby Rodriguez   MR Number: [id number]   YOB: 1930   Date of Visit: 8/27/2020       Dear Bruna Mcmanus:    Thank you for referring Roby Rodriguez to me for evaluation. Attached you will find relevant portions of my assessment and plan of care.    If you have questions, please do not hesitate to call me. I look forward to following Roby Rodriguez along with you.    Sincerely,    Bronwyn Cornejo, [facility]    Enclosure  CC:  No Recipients    If you would like to receive this communication electronically, please contact [email or web address] or [phone] to request more information on [facility] access.    For providers and/or their staff who would like to refer a patient to [facility], please contact us through our one-stop-shop provider referral line, [facility], at [phone].    If you feel you have received this communication in error or would no longer like to receive these types of communications, please e-mail [email or web address]

## 2020-08-28 NOTE — PATIENT INSTRUCTIONS
Lower GI Bleeding (Stable)  You have signs of blood in your stool. This is called rectal bleeding. The bleeding may have begun in another part of your gastrointestinal (GI) tract. If the blood is bright red, it is likely coming from the lower part of the GI tract. If the blood is black or dark, it might be coming from higher up in the GI tract. Very small amounts of GI bleeding may not be visible and can only be discovered during a test on your stool. Possible causes of lower GI bleeding include:  · Hemorrhoids  · Anal fissures  · Diverticulitis  · Inflammatory bowel disease (Crohn's disease or ulcerative colitis)  · Polyps (growths) in the intestine  Note: Iron supplements and medicines for diarrhea or upset stomach can cause black stools. Foods such as licorice and red beets can also discolor the stool and be mistaken for bleeding. These are not bleeding and are not a cause for alarm.  Home care  You have not lost a large amount of blood and your condition appears stable at this time. You may resume normal activity as long as you feel well.  Avoid NSAIDs, such as aspirin, ibuprofen, or naproxen. They can irritate the stomach and cause further bleeding. If you are taking these medicines for other medical reasons, talk to your healthcare provider before you stop them.   Follow-up care  Follow up with your healthcare provider as advised. Further tests may be needed to find the cause of your bleeding.  When to seek medical advice  Call your healthcare provider for any of the following:  · Large amount of rectal bleeding   · Increasing abdominal pain  · Weakness, dizziness  Call 911  Get emergency medical care if any of the following occur:  · Loss of consciousness  · Vomiting blood  Date Last Reviewed: 6/24/2015  © 4958-7932 [employer]. [address] [place] [address]. All rights reserved. This information is not intended as a substitute for professional medical care. Always follow your  healthcare professional's instructions.            Anemia  Anemia is a condition that occurs when your body does not have enough healthy red blood cells (RBCs). RBCs are the parts of your blood that carry oxygen throughout your body. A protein called hemoglobin allows your RBCs to absorb and release oxygen. Without enough RBCs or hemoglobin, your body doesn't get enough oxygen. Symptoms of anemia may then occur.    What are the symptoms of anemia?  Some people with anemia have no symptoms. But most people have symptoms that range from mild to severe. These can include:  · Tiredness (fatigue)  · Weakness  · Pale skin  · Shortness of breath  · Dizziness or fainting  · Rapid heartbeat  · Trouble doing normal amounts of activity  · Jaundice (yellowing of your eyes, skin, or mouth; dark urine)  What causes anemia?  Anemia can occur when your body:  · Loses too much blood  · Does not make enough RBCs  · Destroys your RBCs at a faster rate than it can replace them  · Does not make a normal amount of hemoglobin in your RBCs  These problems can occur for many reasons, including:  · A condition that you are born with (congenital or inherited), such as sickle cell disease or thalassemia  · Heavy bleeding for any reason, including injury, surgery, childbirth, or even heavy menstrual periods  · Being low in certain nutrients, such as iron, folate, or vitamin B12, possibly from a poor diet or a condition like celiac disease or Crohn's disease  · Certain chronic conditions like diabetes, arthritis, or kidney disease  · Certain chronic infections like tuberculosis or HIV  · Exposure to certain medicines, such as those used for chemotherapy  There are different types of anemia. Your healthcare provider can tell you more about the type of anemia you have and what may have caused it.  How is anemia diagnosed?  To diagnose anemia, your healthcare provider orders blood tests. These can include:  · Complete blood cell count (CBC). This  test measures the amounts of the different types of blood cells.  · Blood smear. This test checks the size and shape of your blood cells. To do the test, a drop of your blood is viewed under a microscope. A stain is used to make the blood cells easier to see.  · Iron studies. These tests measure the amount of iron in your blood. Your body needs iron to make hemoglobin in your RBCs.  · Vitamin B12 and folate studies. These tests check for some of the components that help give RBCs a normal size and shape.  · Reticulocyte count. This test measures the amount of new RBCs that your bone marrow makes.  · Hemoglobin electrophoresis. This test checks for problems with your hemoglobin in RBCs.  How is anemia treated?  Treatment for anemia is based on the type of anemia, its cause, and the severity of your symptoms. Treatments may include:  · Diet changes. This involves increasing the amount of certain nutrients in your diet, such as iron, vitamin B12, or folate. Your healthcare provider may also prescribe nutrient supplements.  · Medicines. Certain medicines treat the cause of your anemia. Others help build new RBCs or relieve symptoms. If a medicine is the cause of your anemia, you may need to stop or change it.  · Blood transfusions. Replacing some of your blood can increase the number of healthy RBCs in your body.  · Surgery. In some cases, your doctor may do surgery to treat the underlying cause of anemia. If you need surgery, your healthcare provider will explain the procedure and outline the risks and benefits for you.  What are the long-term concerns?  If you have a certain type of anemia, you can expect a full recovery after treatment. If you have other types of anemia (especially a type you're born with), you will need to manage it for life. Your doctor can tell you more.  Date Last Reviewed: 12/1/2016  © 8979-6360 [employer]. [address] [place] [address], [place] [address]. All rights reserved. This  information is not intended as a substitute for professional medical care. Always follow your healthcare professional's instructions.          GERD (Adult)    The esophagus is a tube that carries food from the mouth to the stomach. A valve at the lower end of the esophagus prevents stomach acid from flowing upward. When this valve doesn't work properly, stomach contents may repeatedly flow back up (reflux) into the esophagus. This is called gastroesophageal reflux disease (GERD). GERD can irritate the esophagus. It can cause problems with swallowing or breathing. In severe cases, GERD can cause recurrent pneumonia or other serious problems.  Symptoms of reflux include burning, pressure or sharp pain in the upper abdomen or mid to lower chest. The pain can spread to the neck, back, or shoulder. There may be belching, an acid taste in the back of the throat, chronic cough, or sore throat or hoarseness. GERD symptoms often occur during the day after a big meal. They can also occur at night when lying down.   Home care  Lifestyle changes can help reduce symptoms. If needed, medicines may be prescribed. Symptoms often improve with treatment, but if treatment is stopped, the symptoms often return after a few months. So most persons with GERD will need to continue treatment.  Lifestyle changes  · Limit or avoid fatty, fried, and spicy foods, as well as coffee, chocolate, mint, and foods with high acid content such as tomatoes and citrus fruit and juices (orange, grapefruit, lemon).  · Dont eat large meals, especially at night. Frequent, smaller meals are best. Do not lie down right after eating. And dont eat anything 3 hours before going to bed.  · Avoid drinking alcohol and smoking. As much as possible, stay away from second hand smoke.  · If you are overweight, losing weight will reduce symptoms.   · Avoid wearing tight clothing around your stomach area.  · If your symptoms occur during sleep, use a foam wedge to  "elevate your upper body (not just your head.) Or, place 4" blocks under the head of your bed.  Medicines  If needed, medicines can help relieve the symptoms of GERD and prevent damage to the esophagus. Discuss a medicine plan with your healthcare provider. This may include one or more of the following medicines:  · Antacids to help neutralize the normal acids in your stomach.  · Acid blockers (H2 blockers) to decrease acid production.  · Acid inhibitors (PPIs) to decrease acid production in a different way than the blockers. They may work better, but can take a little longer to take effect.  Take an antacid 30-60 minutes after eating and at bedtime, but not at the same time as an acid blocker.  Try not to take medicines such as ibuprofen and aspirin. If you are taking aspirin for your heart or other medical reasons, talk to your healthcare provider about stopping it.  Follow-up care  Follow up with your healthcare provider or as advised by our staff.  When to seek medical advice  Call your healthcare provider if any of the following occur:  · Stomach pain gets worse or moves to the lower right abdomen (appendix area)  · Chest pain appears or gets worse, or spreads to the back, neck, shoulder, or arm  · Frequent vomiting (cant keep down liquids)  · Blood in the stool or vomit (red or black in color)  · Feeling weak or dizzy  · Fever of 100.4ºF (38ºC) or higher, or as directed by your healthcare provider  Date Last Reviewed: 6/23/2015  © 0277-0552 [employer]. [address] [place] [address], [place] [address]. All rights reserved. This information is not intended as a substitute for professional medical care. Always follow your healthcare professional's instructions.          Constipation (Adult)  Constipation means that you have bowel movements that are less frequent than usual. Stools often become very hard and difficult to pass.  Constipation is very common. At some point in life it affects almost everyone. " Since everyone's bowel habits are different, what is constipation to one person may not be to another. Your healthcare provider may do tests to diagnose constipation. It depends on what he or she finds when evaluating you.    Symptoms of constipation include:  · Abdominal pain  · Bloating  · Vomiting  · Painful bowel movements  · Itching, swelling, bleeding, or pain around the anus  Causes  Constipation can have many causes. These include:  · Diet low in fiber  · Too much dairy  · Not drinking enough liquids  · Lack of exercise or physical activity. This is especially true for older adults.  · Changes in lifestyle or daily routine, including pregnancy, aging, work, and travel  · Frequent use or misuse of laxatives  · Ignoring the urge to have a bowel movement or delaying it until later  · Medicines, such as certain prescription pain medicines, iron supplements, antacids, certain antidepressants, and calcium supplements  · Diseases like irritable bowel syndrome, bowel obstructions, stroke, diabetes, thyroid disease, Parkinson disease, hemorrhoids, and colon cancer  Complications  Potential complications of constipation can include:  · Hemorrhoids  · Rectal bleeding from hemorrhoids or anal fissures (skin tears)  · Hernias  · Dependency on laxatives  · Chronic constipation  · Fecal impaction  · Bowel obstruction or perforation  Home care  All treatment should be done after talking with your healthcare provider. This is especially true if you have another medical problems, are taking prescription medicines, or are an older adult. Treatment most often involves lifestyle changes. You may also need medicines. Your healthcare provider will tell you which will work best for you. Follow the advice below to help avoid this problem in the future.  Lifestyle changes  These lifestyle changes can help prevent constipation:  · Diet. Eat a high-fiber diet, with fresh fruit and vegetables, and reduce dairy intake, meats, and  processed foods  · Fluids. It's important to get enough fluids each day. Drink plenty of water when you eat more fiber. If you are on diet that limits the amount of fluid you can have, talk about this with your healthcare provider.  · Regular exercise. Check with your healthcare provider first.  Medications  Take any medicines as directed. Some laxatives are safe to use only every now and then. Others can be taken on a regular basis. Talk with your doctor or pharmacist if you have questions.  Prescription pain medicines can cause constipation. If you are taking this kind of medicine, ask your healthcare provider if you should also take a stool softener.  Medicines you may take to treat constipation include:  · Fiber supplements  · Stool softeners  · Laxatives  · Enemas  · Rectal suppositories  Follow-up care  Follow up with your healthcare provider if symptoms don't get better in the next few days. You may need to have more tests or see a specialist.  Call 911  Call 911 if any of these occur:  · Trouble breathing  · Stiff, rigid abdomen that is severely painful to touch  · Confusion  · Fainting or loss of consciousness  · Rapid heart rate  · Chest pain  When to seek medical advice  Call your healthcare provider right away if any of these occur:  · Fever over 100.4°F (38°C)  · Failure to resume normal bowel movements  · Pain in your abdomen or back gets worse  · Nausea or vomiting  · Swelling in your abdomen  · Blood in the stool  · Black, tarry stool  · Involuntary weight loss  · Weakness  Date Last Reviewed: 12/30/2015  © 4124-1075 [employer]. [address] [place] [address], [place] [address]. All rights reserved. This information is not intended as a substitute for professional medical care. Always follow your healthcare professional's instructions.

## 2020-09-01 NOTE — TELEPHONE ENCOUNTER
----- Message from Ktaeryna Hung MD sent at 9/1/2020  1:01 PM CDT -----  Pl inform patient's daughter:   Ultrasound showed:  1. The liver is somewhat suboptimally evaluated due to bowel gas and patient body habitus.  Echotexture might be mildly coarse but there is no focal parenchymal mass or intrahepatic biliary ductal dilatation.  2. The spleen is borderline enlarged without focal abnormality.  3. The gallbladder appears normal..    This scan is not fully supportive of cirrhosis, or portal hypertension. therefore, we have to assume if he does have cirrhosis, it is not advanced.

## 2020-09-01 NOTE — TELEPHONE ENCOUNTER
----- Message from Kateryna Hung MD sent at 9/1/2020  1:01 PM CDT -----  Pl inform patient's daughter:   Ultrasound showed:  1. The liver is somewhat suboptimally evaluated due to bowel gas and patient body habitus.  Echotexture might be mildly coarse but there is no focal parenchymal mass or intrahepatic biliary ductal dilatation.  2. The spleen is borderline enlarged without focal abnormality.  3. The gallbladder appears normal..    This scan is not fully supportive of cirrhosis, or portal hypertension. therefore, we have to assume if he does have cirrhosis, it is not advanced.

## 2020-09-02 NOTE — TELEPHONE ENCOUNTER
Patient daughter called back, inform her of patient ultrasound below. She understood and verbalized understanding.

## 2020-09-03 NOTE — PROGRESS NOTES
[facility]  9 3 20    Cc difficulty voiding    Age 89, expresses difficulty voiding, and this has been going on for about 2 weeks. He was also constipated, but even after he had a bowel movement he continued unable to void. His abdomen is distended.     Creatinine 1.78, eGFR 33 ml      Abdominal ultrasound 9 1 20:   1. The liver is somewhat suboptimally evaluated due to bowel gas and patient body habitus.  Echotexture might be mildly coarse but there is no focal parenchymal mass or intrahepatic biliary ductal dilatation.  2. The spleen is borderline enlarged without focal abnormality.  3. The gallbladder appears normal.    BLADDERSCAN ULTRASOUND  >2018 ml    ml residual        PMH    Surgical:  has a past surgical history that includes Resection of Skin Cancer Scalp; Duputyren Contracture Right Hand; Tonsillectomy; Adenoidectomy; Cardiac pacemaker implantation; Insert / replace / remove pacemaker; Colonoscopy (2004); and Upper gastrointestinal endoscopy (07/31/2017).    Medical:  has a past medical history of *Atrial fibrillation, Allergy, Arthritis, Atrial fibrillation, Basal cell carcinoma, Basal cell carcinoma, Cancer, Conductive hearing loss, bilateral, Frequency of urination, Gastroenteritis, Headache(784.0), Hypertension, Kidney stones, Otitis media, Squamous Cell Carcinoma, and Supraventricular tachycardia.    Familial: no fh of renal disease    Social: , lives in [place]    Meds:   Current Outpatient Medications on File Prior to Visit   Medication Sig Dispense Refill    atorvastatin (LIPITOR) 40 MG tablet Take 1 tablet (40 mg total) by m 90 tablet 0    bisacodyL (DULCOLAX) 5 mg EC tablet Take 5 mg by mouth daily as tion.      C,E,zinc,copper 11/eaqtz6n/lut (OCUVITE ADULT 50 PLUS ORAL) Take 1 capsule by mouth every morning.       docusate sodium (COLACE) 100 MG capsule Take 100 mg by mouth 2 (tw      furosemide (LASIX) 20 MG tablet Take 1 tablet (20 mg total) by heaven 90 tablet 3     guanFACINE (TENEX) 1 MG Tab TAKE 1 TABLET EVERY EV 90 tablet 1    ipratropium (ATROVENT) 42 mcg (0.06 %) nasal spray USE 2 SPRAYS BY NASAL R 15 mL 6    lisinopriL (PRINIVIL,ZESTRIL) 20 MG tablet Take 1 tablet (20 mg total) 180 tablet 1    metoprolol succinate (TOPROL-XL) 50 MG 24 hr tablet TAKE 1 TABLET ONCEg. ) 90 tablet 3    nystatin (MYCOSTATIN) powder Apply topically 4 (four) times daily. 60 g 0    pantoprazole (PROTONIX) 40 MG tablet TAKE 1 TABLET ONCE, break, chew) 90 tablet 1    polyethylene glycol (GLYCOLAX) 17 gram/dose powder Take 17 g by mouth once daily. Mix with 8 oz of water/liquid. 510 g 2    spironolactone (ALDACTONE) 50 MG tablet Take 1 tablet (50 mg total) b 90 tablet 3    vitamin D 1000 units Tab Take 1,00      XARELTO 20 mg Tab TAKE 1 T 90 tablet 3     REVIEW OF SYSTEMS  GENERAL: No headaches or dizzy spells.   HEENT: vision preserved. Sinuses: No complaints.   CARDIOPULMONARY: No swelling of the legs; no chest pain. No shortness of breath, no wheezing.   GASTROINTESTINAL: has heartburn. Denies diarrhea; has constipation, no blood or mucus in stools.   GENITOURINARY: has difficulty voiding. Denies dysuria, bleeding or incontinence.   MUSCULOSKELETAL: has arthritic complaints such as pain or stiffness.   PSYCHIATRIC: No history of depression or anxiety.   ENDOCRINOLOGIC: No reports of sweating, cold or heat intolerance. No polyuria or polydipsia.   NEUROLOGICAL: No dizziness, syncope, paralysis  LYMPHATICS: No enlarged nodes. No history of splenectomy.    Pt alert, oriented, no distress. Hard of hearing  HEENT: wnl.  Neck: supple, no JVD, no lymphadenopathy  Chest: CV NSR  Lungs: normal chest expansion, no labored breathing  Abdomen flat, nontender, no organomegaly, no masses.  No hernias  Penis nl, meatus nl  Testes nl, epi nl, scrotum nl  OMID: anus nl, sphincter nl tone, mucosa without lesions, prostate mostly flat.   Extremities: no edema, peripheral pulses nl  Neuro:  preserved    IMP    Acute urinary retention, drained 2600 ml with a catheter (with brief interruptions to avoid ex-vacuo hemorrhage)  Will need to keep the javier x 21 days or more.   Start flomax (sent to his pharmacy)  Eventually will need cystoscopy to study his bladder outlet and see if he is a candidate for turp or similar. However, his lack of pain when so markedly distended suggests atonic neurogenic bladder.

## 2020-09-03 NOTE — LETTER
September 7, 2020      Wayne Silverman MD  [address]  [facility] [address]           [facility]  [address] [place] [address]  Phone: [phone]  Fax: [phone]          Patient: Roby Rodriguez   MR Number: [id number]   YOB: 1930   Date of Visit: 9/3/2020       Dear Dr. Wayne Silverman:    Thank you for referring Roby Rodriguez to me for evaluation. Attached you will find relevant portions of my assessment and plan of care.    If you have questions, please do not hesitate to call me. I look forward to following Roby Rodriguez along with you.    Sincerely,    Jignesh Khan MD    Enclosure  CC:  No Recipients    If you would like to receive this communication electronically, please contact [email or web address] or [phone] to request more information on [employer] access.    For providers and/or their staff who would like to refer a patient to [facility], please contact us through our one-stop-shop provider referral line, [facility], at [phone].    If you feel you have received this communication in error or would no longer like to receive these types of communications, please e-mail [email or web address]

## 2020-09-08 NOTE — TELEPHONE ENCOUNTER
----- Message from Naomy Mccoy sent at 9/8/2020  1:17 PM CDT -----  Regarding: speak w/ Nurse Witt  Contact: Pt's Daughter. Rae  Pt's Daughter Aanid requesting to speak w/ Nurse Witt    Please call     Phone [phone]    Thank you    Naomy Mccoy

## 2020-09-08 NOTE — TELEPHONE ENCOUNTER
Spoke with patient's wife and relayed Bruna's recommendation: decrease Xarelto to 15mg daily and decrease Aldactone from 50mg daily to 25mg daily. Repeat BMP in 2 weeks. She verbalized understanding and repeated back dose changes.

## 2020-09-08 NOTE — TELEPHONE ENCOUNTER
Patient with worsening kidney function. History of hospitalization for dehydration. Will reduce xarelto dose and spironolactone dose. BMP in 2 weeks.

## 2020-09-09 NOTE — PROGRESS NOTES
"Patient c/o "pulling" and pain. After examining skin, patient appears to have reddened skin abound the pubic and pelvic area and red varicoces on the testicles that are causing bleeding into the briefs. Pastor catheter appeared patent and was not causing any issue. Per Dr. Khan, patient needs yeast powder (nystatin).  Advised to clean and dry the area at least 3 times a day and apply nystatin powder. Patient's daughter expressed understanding.     "

## 2020-09-09 NOTE — TELEPHONE ENCOUNTER
----- Message from Sweta Jay sent at 9/9/2020 11:35 AM CDT -----  Regarding: Medical Advice  Contact: Daughter, Erika James have some medical questions need to speak with a nurse please call back at [phone]    Case number [id number]

## 2020-09-09 NOTE — TELEPHONE ENCOUNTER
Patient's daughter states catheter is sticking to patient. Scheduled appt with nurse to come in and evaluate

## 2020-09-11 NOTE — LETTER
September 11, 2020      Bronwyn Cornejo, FARHAD  [address] [facility] [address] [place] [address]           [facility] [place] [address] [address]  [facility] [address]  Phone: [phone]  Fax: [phone]          Patient: Roby Rodriguez   MR Number: [id number]   YOB: 1930   Date of Visit: 9/11/2020       Dear Bronwyn Cornejo:    Thank you for referring Roby Rodriguez to me for evaluation. Attached you will find relevant portions of my assessment and plan of care.    If you have questions, please do not hesitate to call me. I look forward to following Roby Rodriguez along with you.    Sincerely,    Zack Palomino MD    Enclosure  CC:  No Recipients    If you would like to receive this communication electronically, please contact [email or web address] or [phone] to request more information on [employer] Link access.    For providers and/or their staff who would like to refer a patient to [facility], please contact us through our one-stop-shop provider referral line, [facility], at [phone].    If you feel you have received this communication in error or would no longer like to receive these types of communications, please e-mail [email or web address]

## 2020-09-11 NOTE — PROGRESS NOTES
HPI      89 years old male referred to Hematology for anemia thrombocytopenia.  Patient is in wheelchair Pastor catheter.  History of atrial fibrillation on long-term anticoagulations.    Patient denies any bleeding.  Patient complaining of upper and lower extremity swelling and edema.  Patient is taking diuretic for control need fluid status.    Denies chest pain shortness breath.  Denies abdominal pain nausea vomiting or diarrhea.  Recent still call studies positive for blood.        Past Medical History:   Diagnosis Date    *Atrial fibrillation     Allergy     Arthritis     Atrial fibrillation     Basal cell carcinoma 10/12/10    left earlobe    Basal cell carcinoma 7/23/2014    right neck    Cancer     Hx of Squamous Cell CA Scalp    Conductive hearing loss, bilateral     Chronic Eustachian Tube Dysfunction    Frequency of urination     Gastroenteritis     Headache(784.0)     Hypertension     Kidney stones     Otitis media     Squamous Cell Carcinoma 7/27/07    SCC ant scalp    Supraventricular tachycardia      Social History     Socioeconomic History    Marital status:      Spouse name: Not on file    Number of children: Not on file    Years of education: Not on file    Highest education level: Not on file   Occupational History    Occupation: Retired   Social Needs    Financial resource strain: Not on file    Food insecurity     Worry: Not on file     Inability: Not on file    Transportation needs     Medical: Not on file     Non-medical: Not on file   Tobacco Use    Smoking status: Never Smoker    Smokeless tobacco: Never Used   Substance and Sexual Activity    Alcohol use: Yes     Alcohol/week: 0.0 - 3.0 standard drinks    Drug use: No    Sexual activity: Not on file   Lifestyle    Physical activity     Days per week: Not on file     Minutes per session: Not on file    Stress: Not on file   Relationships    Social connections     Talks on phone: Not on file     Gets  together: Not on file     Attends Congregational service: Not on file     Active member of club or organization: Not on file     Attends meetings of clubs or organizations: Not on file     Relationship status: Not on file   Other Topics Concern    Not on file   Social History Narrative    Not on file         Subjective      Review of Systems   Constitutional: Negative for appetite change, fatigue and unexpected weight change.   HENT: Negative for mouth sores.   Eyes: Negative for visual disturbance.   Respiratory: Negative for cough and shortness of breath.   Cardiovascular: Negative for chest pain.   Gastrointestinal: Negative for diarrhea.   Genitourinary: Negative for frequency.   Musculoskeletal: Negative for back pain.   Skin: Negative for rash.   Neurological: Negative for headaches.   Hematological: Negative for adenopathy.   Psychiatric/Behavioral: The patient is not nervous/anxious.   All other systems reviewed and are negative.     Objective    Physical Exam   Vitals:    09/11/20 1410   BP: (!) 81/56   Pulse: 84   Resp: 20   Temp: 98.4 °F (36.9 °C)         Awake alert  No acute distress  Normocephalic atraumatic  Normal rate  Normal respiratory effort  Edema of both upper and lower extremities  Pastor catheter  Soft nontender nondistended  No rash no lesions  Wheelchair  Cranial nerves 2-12 grossly intact      CMP  Sodium   Date Value Ref Range Status   09/04/2020 136 136 - 145 mmol/L Final     Potassium   Date Value Ref Range Status   09/04/2020 5.0 3.5 - 5.1 mmol/L Final     Chloride   Date Value Ref Range Status   09/04/2020 107 95 - 110 mmol/L Final     CO2   Date Value Ref Range Status   09/04/2020 21 (L) 23 - 29 mmol/L Final     Glucose   Date Value Ref Range Status   09/04/2020 99 70 - 110 mg/dL Final     BUN, Bld   Date Value Ref Range Status   09/04/2020 32 (H) 8 - 23 mg/dL Final     Creatinine   Date Value Ref Range Status   09/04/2020 2.3 (H) 0.5 - 1.4 mg/dL Final     Calcium   Date Value Ref Range  Status   09/04/2020 8.6 (L) 8.7 - 10.5 mg/dL Final     Total Protein   Date Value Ref Range Status   08/18/2020 6.0 6.0 - 8.4 g/dL Final     Albumin   Date Value Ref Range Status   08/18/2020 2.9 (L) 3.5 - 5.2 g/dL Final     Total Bilirubin   Date Value Ref Range Status   08/18/2020 1.8 (H) 0.2 - 1.3 mg/dL Final     Alkaline Phosphatase   Date Value Ref Range Status   08/18/2020 113 38 - 145 U/L Final     AST   Date Value Ref Range Status   08/18/2020 52 17 - 59 U/L Final     ALT   Date Value Ref Range Status   08/18/2020 33 0 - 50 U/L Final     Anion Gap   Date Value Ref Range Status   09/04/2020 8 8 - 16 mmol/L Final     eGFR if    Date Value Ref Range Status   09/04/2020 27.9 (A) >60 mL/min/1.73 m^2 Final     eGFR if non    Date Value Ref Range Status   09/04/2020 24.1 (A) >60 mL/min/1.73 m^2 Final     Comment:     Calculation used to obtain the estimated glomerular filtration  rate (eGFR) is the CKD-EPI equation.        Lab Results   Component Value Date    WBC 7.42 08/18/2020    HGB 12.0 (L) 08/18/2020    HCT 36.1 (L) 08/18/2020     (H) 08/18/2020     (L) 08/18/2020     Ultrasound abdomen 09/01/2020  Impression:     1. The liver is somewhat suboptimally evaluated due to bowel gas and patient body habitus.  Echotexture might be mildly coarse but there is no focal parenchymal mass or intrahepatic biliary ductal dilatation.  2. The spleen is borderline enlarged without focal abnormality.  3. The gallbladder appears normal..    Assessment    Anemia    Thrombocytopenia    Marcrocytosis    Chronic kidney disease    AFib with long-term use of anticoagulation    Plan    CBC CMP LDH iron panel ferritin SPEP MIRIAM free light chain    RTC end of October with repeat CBC    Follow-up primary care physician    Following nephrologist and urologist    Reassurance to patient and his daughter.  should be sufficient.  I will obtain blood work today.  I will have  patient return to clinic end of October for repeat CBC to ensure stability.        Fecal occult blood test positive  -     Ambulatory referral/consult to Hematology / Oncology    Macrocytic anemia  -     Ambulatory referral/consult to Hematology / Oncology

## 2020-09-15 PROBLEM — Z71.89 ACP (ADVANCE CARE PLANNING): Status: ACTIVE | Noted: 2020-01-01

## 2020-09-15 PROBLEM — N30.00 ACUTE CYSTITIS WITHOUT HEMATURIA: Status: ACTIVE | Noted: 2020-01-01

## 2020-09-15 NOTE — TELEPHONE ENCOUNTER
----- Message from Favio Cisneros sent at 9/15/2020  8:25 AM CDT -----  Regarding: reggie rodrigez  Type: Needs Medical Advice    Who Called:  reggie Perdue Call Back Number: [phone]  Additional Information: message for Nurse Witt.

## 2020-09-15 NOTE — PROGRESS NOTES
Called back Erika, Daughter, & spoke to both Erika & Molly> They informed me Mr. Rodriguez was at home weak, hallucinating, can't walk, Sob, no fever, urine is dark.  Erika informed me that since last Friday, he has had a decline. Eating little .  Instructed Erika to take him to the ER, she declined because he would be alone. I called Dr. Silverman & he will call them to encourage the same , go to the ER.

## 2020-09-15 NOTE — TELEPHONE ENCOUNTER
Spoke to pt and spoke to Dr. Khan and he advise that the pt go to the ER. Pt just completed a full round of bactrim on Friday. Pt is having weakness, leg swelling, hallucinations. Pt does not have a fever, the daughter has been checking his temp. But pt is not able to ambulate without the daughter picking him up and moving him. I spoke to both of the pt's daughters and they verbalized understanding. I did inform them I would send a message to the pt's PCP informing him of this information. They are concerned if they bring him to the hospital, he will be admitted and no one can stay with him there.

## 2020-09-15 NOTE — TELEPHONE ENCOUNTER
ANTONIETA      Spoke to pt's daughters and spoke to Dr. Khan and he advise that the pt go to the ER. Pt just completed a full round of bactrim on Friday. Pt is having weakness, leg swelling, hallucinations. Pt does not have a fever, the daughter has been checking his temp. But pt is not able to ambulate without the daughter picking him up and moving him. I spoke to both of the pt's daughters and they verbalized understanding. I did inform them I would send a message to the pt's PCP informing him of this information. They are concerned if they bring him to the hospital, he will be admitted and no one can stay with him there.     Pt is at ER now.

## 2020-09-16 PROBLEM — G93.41 ACUTE METABOLIC ENCEPHALOPATHY: Status: ACTIVE | Noted: 2020-01-01

## 2020-09-17 PROBLEM — E87.1 HYPONATREMIA: Status: ACTIVE | Noted: 2020-01-01

## 2020-09-20 PROBLEM — R53.81 DEBILITY: Status: ACTIVE | Noted: 2020-01-01

## 2020-09-30 NOTE — TELEPHONE ENCOUNTER
----- Message from Torie Valdovinos sent at 9/30/2020  1:03 PM CDT -----  Regarding: advice  Contact: Karen with [facility]  Type: Needs Medical Advice  Who Called:  Karen with [facility]  Symptoms (please be specific):  catheter removal and urine sample  Best Call Back Number: [phone]  Additional Information: Karen with like to speak to nurse. Thanks!

## 2020-10-01 NOTE — TELEPHONE ENCOUNTER
[facility] pulled catheter this morning for voiding trial, nurse wanting orders for UA and UC. Approved orders.

## 2020-10-01 NOTE — TELEPHONE ENCOUNTER
----- Message from Ezequiel Gallardo sent at 10/1/2020  9:27 AM CDT -----  Regarding: Advice  Contact: Janeth Bautista [phone]  Patient is returning a phone call.  Who left a message for the patient: Dr carpio   Does patient know what this is regarding:    Comments: want to inform the patient has began bladder treatment last night, will remove the catheter, want to confirm the test that is needed for the Urine collect.    Please call an advise  Thank you

## 2020-10-02 NOTE — TELEPHONE ENCOUNTER
----- Message from Favio Cisneros sent at 10/2/2020 10:06 AM CDT -----  Regarding: Karen Fowler [facility]  Type:  Patient Returning Call    Who Called:  Karen  Who Left Message for Patient:  Miryam  Does the patient know what this is regarding?:  yes  Best Call Back Number:  [phone]  Additional Information:  Had to put cath back in PT. Does PT Need appt with Dr Khan? Please call to advise.

## 2020-10-07 NOTE — TELEPHONE ENCOUNTER
Spoke with patient's daughter and was advised that patient was taken to see Dr. Garcia without family authorization. Spoke with Karen at [facility] and was advised this was done on NP's recommendation and they were unaware that the patient already had a Urologist. Was advised that Dr. Garcia was recommending a Cysto/TRUSS. Communicated with patient's daughter and advised of my findings. Explained why this procedure may be requested. Patient's daughter expressed understanding and advised that she would prefer to stay within the [facility] system and see Dr. Garcia here rather than at his private practice. Advised this can be done.

## 2020-10-16 PROBLEM — R60.0 BILATERAL LOWER EXTREMITY EDEMA: Status: ACTIVE | Noted: 2020-01-01

## 2020-10-16 PROBLEM — R82.90 ABNORMAL URINALYSIS: Status: ACTIVE | Noted: 2020-01-01

## 2020-10-16 PROBLEM — N17.9 ACUTE KIDNEY INJURY: Status: ACTIVE | Noted: 2020-01-01

## 2020-10-17 PROBLEM — E53.8 FOLATE DEFICIENCY: Status: ACTIVE | Noted: 2020-01-01

## 2020-10-19 PROBLEM — I95.9 HYPOTENSION: Status: ACTIVE | Noted: 2020-01-01

## 2020-10-20 PROBLEM — Z51.5 COMFORT MEASURES ONLY STATUS: Status: ACTIVE | Noted: 2020-01-01

## 2020-10-21 PROBLEM — K65.9 PERITONITIS: Status: ACTIVE | Noted: 2020-10-21

## 2020-11-13 ENCOUNTER — TELEPHONE (OUTPATIENT)
Dept: CARDIOLOGY | Facility: HOSPITAL | Age: 85
End: 2020-11-13

## 2020-11-13 NOTE — TELEPHONE ENCOUNTER
Patients daughter called and stated he is now . She asked if she could drop off his MDT equipment at the office, I informed her that was fine.   ----- Message from Liz Mcmillan MA sent at 2020 10:23 AM CST -----    ----- Message -----  From: Jadon Appiah  Sent: 2020  10:13 AM CST  To: Caroline Loya Staff    Type: Needs Medical Advice  Who Called:  Erika Rodriguez (Daughter)    Best Call Back Number: [phone]  Additional Information: Caller states that she would like a callback regarding what to do with the patient's [employer] equipment

## 2021-11-23 NOTE — PROGRESS NOTES
Subjective:       Patient ID: Roby Rodriguez is a 88 y.o. male.    Chief Complaint: Follow-up    HPI Overall doing great.  The patient's daughter is here in case discussed at length with both the patient and his daughter; labs reviewed at length as well  HTN: BP avr at home 110's upper -120's lower/70's high til low 80's; watches his salt intake. On lisinopril and metoprolol.  HLP: on low fat high fiber diet; tolerates atorvastatin.   GERD: no bedtime snacks; on pantoprazole. Hx PUD.  Alcohol; 7 drinks a week either as a glass of wine or beer.  Macrocytic anemia; decrease alcohol intake to 3 drinks a week to see if MCV improves as well as anemia  Transaminitis with mild protein calorie malnutrition; cutting back on alcohol intake at least 50% should help his diet as well as his liver enzymes.  PAF on xarelto; no chest pain, SOB, or palp. Cardiology is Dr Caroline martinez from [facility]; has been stable.   Total time 2:45 p.m. to 340 p.m..  Greater than 50% time spent in discussion, counseling, and review. Labs will ordered for follow-up    Review of Systems   Constitutional: Negative for appetite change and unexpected weight change.   HENT: Negative for congestion, postnasal drip, rhinorrhea and sinus pressure.         Denies seasonal allergies, or perennial allergies   Eyes: Negative for discharge and itching.   Respiratory: Negative for cough, chest tightness, shortness of breath and wheezing.    Cardiovascular: Negative for chest pain, palpitations and leg swelling.   Gastrointestinal: Positive for constipation. Negative for abdominal pain, blood in stool, diarrhea, nausea and vomiting.        BM's avr qod; metamucil qod; colace as needed. Uses dulcolax lax as needed.   Endocrine: Negative for polydipsia, polyphagia and polyuria.   Genitourinary: Negative for dysuria and hematuria.   Musculoskeletal: Negative for arthralgias and myalgias.   Skin: Negative for rash.   Allergic/Immunologic: Negative for  "environmental allergies and food allergies.   Neurological: Negative for tremors, seizures and headaches.   Hematological: Negative for adenopathy. Does not bruise/bleed easily.   Psychiatric/Behavioral:        Denies anxiety or depression.       Objective:      Vitals:    07/24/18 1423   BP: 126/60   Pulse: 68   Temp: 97.7 °F (36.5 °C)   Weight: 92 kg (202 lb 12.8 oz)   Height: 6' 1" (1.854 m)     Body mass index is 26.76 kg/m².    Physical Exam   Constitutional: He is oriented to person, place, and time. He appears well-developed and well-nourished.   HENT:   Head: Normocephalic and atraumatic.   Eyes: EOM are normal.   Neck: Normal range of motion. Neck supple. No thyromegaly present.   Cardiovascular: Normal rate and normal heart sounds.  Exam reveals no gallop.    No murmur heard.  irreg irreg without m/g.   Pulmonary/Chest: Effort normal and breath sounds normal. No respiratory distress. He has no wheezes. He has no rales.   Abdominal: Soft. Bowel sounds are normal. He exhibits no distension. There is no tenderness. There is no rebound and no guarding.   Musculoskeletal: Normal range of motion. He exhibits edema.   2-3+ RLE edema; 3+ LLE edema; no calf tenderness to palp. Minimum weakness LLE; DTR's patellar 2+.   Lymphadenopathy:     He has no cervical adenopathy.   Neurological: He is alert and oriented to person, place, and time.   Moves all 4 extremities fine.   Skin: No rash noted.   Psychiatric: He has a normal mood and affect. His behavior is normal. Thought content normal.   Vitals reviewed.      Assessment:       1. Essential hypertension    2. Mixed hyperlipidemia    3. Paroxysmal atrial fibrillation    4. Long term current use of anticoagulant therapy    5. Completed stroke    6. Left leg weakness    7. Overweight    8. Subclinical hypothyroidism    9. Macrocytic anemia    10. Transaminitis    11. Alcohol use        Plan:       Essential hypertension. Maintain < 2 Gm Na a day diet, and monitor BP at " home; keep a log. Same tx.  -     CBC auto differential; Future; Expected date: 07/24/2018  -     Comprehensive metabolic panel; Future; Expected date: 07/24/2018    Mixed hyperlipidemia. Maintain low fat high fiber diet, exercise regularly. On atorvastatin.    Paroxysmal atrial fibrillation; card is Dr Velazquez; on xarelto.  -     Comprehensive metabolic panel; Future; Expected date: 07/24/2018  -     Magnesium; Future; Expected date: 07/24/2018    Long term current use of anticoagulant therapy; xarelto per cardiology.    Completed stroke    Left leg weakness; has been stable. Uses cane w walking.    Overweight. Caloric restriction w regular exercise and weight reduction.    Subclinical hypothyroidism; will follow.  -     TSH; Future; Expected date: 07/24/2018  -     T4, free; Future; Expected date: 07/24/2018    Macrocytic anemia; decrease alcohol 50% .   -     CBC auto differential; Future; Expected date: 07/24/2018  -     Vitamin B12; Future; Expected date: 07/24/2018  -     Folate; Future; Expected date: 07/24/2018    Transaminitis; alcohol, overweight; lipids; control of all of these will help.  -     Comprehensive metabolic panel; Future; Expected date: 07/24/2018    Alcohol use; decrease alcohol use 50%. From 7 drinks beer a week to 3 gl of wine or beer.   -     CBC auto differential; Future; Expected date: 07/24/2018           show

## 2025-04-09 NOTE — PT/OT/SLP DISCHARGE
Occupational Therapy Discharge Summary    Roby Rodriguez  MRN: [id number]   Embolic stroke involving left cerebellar artery   Patient Discharged from acute Occupational Therapy on 4/3/17.  Please refer to prior OT note dated on 4/2/17 for functional status.     Assessment:   Patient has not met goals.  GOALS:   Occupational Therapy Goals     Not on file      Multidisciplinary Problems (Resolved)        Problem: Occupational Therapy Goal    Goal Priority Disciplines Outcome Interventions   Occupational Therapy Goal   (Resolved)     OT, PT/OT Outcome(s) achieved    Description:  Goals to be met by: 4/8/17     Patient will increase functional independence with ADLs by performing:    UE Dressing with Supervision. - not met  LE Dressing with Supervision. - not met  Grooming while standing with Supervision. - not met  Toileting from toilet with Supervision for hygiene and clothing management. - not met  Rolling to Bilateral with Modified [place]. - not met  Supine to sit with Supervision. - not met  Stand pivot transfers with Supervision. - not met               Reasons for Discontinuation of Therapy Services  Transfer to alternate level of care.      Plan:  Patient Discharged to: Outpatient Therapy Services.    KRISHNA Vanegas 4/3/2017     0

## 2025-05-30 NOTE — PATIENT INSTRUCTIONS
Essential hypertension. Maintain < 2 Gm Na a day diet, and monitor BP at home; keep a log. Same tx.  -     Comprehensive metabolic panel; Future; Expected date: 01/24/2019    Mixed hyperlipidemia. Maintain low fat high fiber diet, exercise regularly. On atrovastatin    Paroxysmal atrial fibrillation; sees [facility] as cardiology.    Long term current use of anticoagulant therapy; on Xarelto as per cardiology.    Alcohol use; needs to quit altogether so nutrition improves and anemia. As well as albumin and peripheral edema.  -     Gamma GT; Future; Expected date: 01/24/2019    Transaminitis; wean off alcohol altogether; further evssluation as below. No NSAID agents; limit tylenol to <2000 mg a day.  -     Hepatitis B core antibody, IgM; Future; Expected date: 01/24/2019  -     Hepatitis B surface antibody; Future; Expected date: 01/24/2019  -     Hepatitis B surface antigen; Future; Expected date: 01/24/2019  -     Hepatitis C antibody; Future; Expected date: 01/24/2019  -     Comprehensive metabolic panel; Future; Expected date: 01/24/2019  -     Transferrin; Future; Expected date: 01/24/2019  -     Iron and TIBC; Future; Expected date: 01/24/2019  -     Ferritin; Future; Expected date: 01/24/2019  -     Reticulocytes; Future; Expected date: 01/24/2019  -     US Abdomen Complete; Future; Expected date: 01/24/2019  -     Gamma GT; Future; Expected date: 01/24/2019    Moderate malnutrition; Ensure w protein 1 can a day. Wean off alcohol.   -     Comprehensive metabolic panel; Future; Expected date: 01/24/2019  -     Prealbumin; Future; Expected date: 01/24/2019    Anemia, unspecified type; as above; check iFOBT as well.  -     CBC auto differential; Future; Expected date: 01/24/2019    Thrombocytopenia  -     CBC auto differential; Future; Expected date: 01/24/2019    Overweight (BMI 25.0-29.9)potassium.Caloric restriction w regular exercise and weight reduction.    Kidney stones; keep well hydrated.    Lower  [Mother] : mother extremity edema; Maintain less than 2 g sodium diet; elevate lower extremities at home; use compression stockings if possible. Medium strength 16-18 mmHg.To below the knees.